# Patient Record
Sex: MALE | Race: BLACK OR AFRICAN AMERICAN | Employment: FULL TIME | URBAN - METROPOLITAN AREA
[De-identification: names, ages, dates, MRNs, and addresses within clinical notes are randomized per-mention and may not be internally consistent; named-entity substitution may affect disease eponyms.]

---

## 2017-10-27 ENCOUNTER — OFFICE VISIT (OUTPATIENT)
Dept: INTERNAL MEDICINE CLINIC | Age: 42
End: 2017-10-27

## 2017-10-27 VITALS
TEMPERATURE: 99.9 F | OXYGEN SATURATION: 99 % | HEART RATE: 97 BPM | WEIGHT: 215.25 LBS | DIASTOLIC BLOOD PRESSURE: 105 MMHG | RESPIRATION RATE: 18 BRPM | SYSTOLIC BLOOD PRESSURE: 170 MMHG | HEIGHT: 69 IN | BODY MASS INDEX: 31.88 KG/M2

## 2017-10-27 DIAGNOSIS — L40.9 PSORIASIS: Chronic | ICD-10-CM

## 2017-10-27 DIAGNOSIS — I10 ACCELERATED HYPERTENSION: Primary | ICD-10-CM

## 2017-10-27 DIAGNOSIS — K42.9 UMBILICAL HERNIA WITHOUT OBSTRUCTION AND WITHOUT GANGRENE: ICD-10-CM

## 2017-10-27 DIAGNOSIS — E11.9 TYPE 2 DIABETES MELLITUS WITH HEMOGLOBIN A1C GOAL OF LESS THAN 7.0% (HCC): ICD-10-CM

## 2017-10-27 LAB — HBA1C MFR BLD HPLC: 6.2 %

## 2017-10-27 RX ORDER — AMLODIPINE BESYLATE 5 MG/1
5 TABLET ORAL DAILY
Qty: 30 TAB | Refills: 0 | Status: SHIPPED | OUTPATIENT
Start: 2017-10-27 | End: 2017-11-10 | Stop reason: SDUPTHER

## 2017-10-27 RX ORDER — FLUOCINONIDE 0.5 MG/G
OINTMENT TOPICAL
Refills: 2 | COMMUNITY
Start: 2017-08-07 | End: 2017-11-10

## 2017-10-27 RX ORDER — ASPIRIN 81 MG/1
81 TABLET ORAL DAILY
COMMUNITY
End: 2018-06-07

## 2017-10-27 RX ORDER — LISINOPRIL 2.5 MG/1
TABLET ORAL DAILY
COMMUNITY
End: 2017-10-27 | Stop reason: ALTCHOICE

## 2017-10-27 NOTE — MR AVS SNAPSHOT
Visit Information Date & Time Provider Department Dept. Phone Encounter #  
 10/27/2017  2:15 PM Nicko Goncalves MD [facility] [address] [facility] [id number] Follow-up Instructions Return in about 2 weeks (around 11/10/2017). Your Appointments 11/14/2017  1:30 PM  
New Patient with Nicko Goncalves MD  
[facility] Appt Note: new pt wants to become established [address] [facility] [address]  
[phone]  
  
   
 [address] [facility] [address] Upcoming Health Maintenance Date Due DTaP/Tdap/Td series (1 - Tdap) 5/3/1998 INFLUENZA AGE 9 TO ADULT 8/1/2017 Allergies as of 10/27/2017  Review Complete On: 10/27/2017 By: Nicko Goncalves MD  
 No Known Allergies Current Immunizations  Reviewed on 10/27/2017 Name Date  
 TB Skin Test (PPD) 4/1/2017 Reviewed by Nicko Goncalves MD on 10/27/2017 at  2:37 PM  
You Were Diagnosed With   
  
 Codes Comments Accelerated hypertension    -  Primary ICD-10-CM: I10 
ICD-9-CM: 401.0 Psoriasis     ICD-10-CM: L40.9 ICD-9-CM: 696.1 Type 2 diabetes mellitus with hemoglobin A1c goal of less than 7.0% (HCC)     ICD-10-CM: E11.9 ICD-9-CM: 250.00 Umbilical hernia without obstruction and without gangrene     ICD-10-CM: K42.9 ICD-9-CM: 553.1 Vitals BP Pulse Temp Resp Height(growth percentile) Weight(growth percentile) (!) 170/105 (BP 1 Location: Left arm, BP Patient Position: Sitting) 97 99.9 °F (37.7 °C) (Oral) 18 5' 9\" (1.753 m) 215 lb 4 oz (97.6 kg) SpO2 BMI Smoking Status 99% 31.79 kg/m2 Light Tobacco Smoker Vitals History BMI and BSA Data Body Mass Index Body Surface Area 31.79 kg/m 2 2.18 m 2 Preferred Pharmacy Pharmacy Name Phone [id number] Stafford, [address] [facility] [facility] [phone] Your Updated Medication List  
 This list is accurate as of: 10/27/17  3:08 PM.  Always use your most recent med list. amLODIPine 5 mg tablet Commonly known as:  Cristo Eagles Take 1 Tab by mouth daily. aspirin delayed-release 81 mg tablet Take 1 Tab by mouth daily. fluocinoNIDE 0.05 % ointment Commonly known as:  LIDEX APPLY TO AFFECTED AREA 2 TIMES DAILY TO PALMS & SOLES Prescriptions Sent to Pharmacy Refills  
 amLODIPine (NORVASC) 5 mg tablet 0 Sig: Take 1 Tab by mouth daily. Class: Normal  
 Pharm[id number] [address] [facility] Ph #: [phone] Route: Oral  
  
We Performed the Following AMB POC HEMOGLOBIN A1C [94655 CPT(R)] METABOLIC PANEL, BASIC [31844 CPT(R)] MICROALBUMIN, UR, RAND W/ MICROALBUMIN/CREA RATIO [id number] CPT(R)] REFERRAL TO SURGERY [NTS074 Custom] Follow-up Instructions Return in about 2 weeks (around 11/10/2017). Referral Information Referral ID Referred By Referred To  
  
 [id number] David Warren MD   
   [address] [place] [address] Phone: [phone] Fax: [phone] Visits Status Start Date End Date 1 New Request 10/27/17 10/27/18 If your referral has a status of pending review or denied, additional information will be sent to support the outcome of this decision. Introducing [facility] & HEALTH SERVICES! Remi Lieberman introduces [employer] patient portal. Now you can access parts of your medical record, email your doctor's office, and request medication refills online. 1. In your internet browser, go to [email or web address] 2. Click on the First Time User? Click Here link in the Sign In box. You will see the New Member Sign Up page. 3. Enter your [employer] Access Code exactly as it appears below. You will not need to use this code after youve completed the sign-up process.  If you do not sign up before the expiration date, you must request a new code. · Toolmeet Access Code: [email or web address] Expires: 1/25/2018  1:32 PM 
 
4. Enter the last four digits of your Social Security Number (xxxx) and Date of Birth (mm/dd/yyyy) as indicated and click Submit. You will be taken to the next sign-up page. 5. Create a Toolmeet ID. This will be your Toolmeet login ID and cannot be changed, so think of one that is secure and easy to remember. 6. Create a Toolmeet password. You can change your password at any time. 7. Enter your Password Reset Question and Answer. This can be used at a later time if you forget your password. 8. Enter your e-mail address. You will receive e-mail notification when new information is available in [address] 9. Click Sign Up. You can now view and download portions of your medical record. 10. Click the Download Summary menu link to download a portable copy of your medical information. If you have questions, please visit the Frequently Asked Questions section of the Toolmeet website. Remember, Toolmeet is NOT to be used for urgent needs. For medical emergencies, dial 911. Now available from your iPhone and Android! Please provide this summary of care documentation to your next provider. Your primary care clinician is listed as Pastor Muller. If you have any questions after today's visit, please call [phone].

## 2017-10-27 NOTE — PROGRESS NOTES
HISTORY OF PRESENT ILLNESS  Jayshree Jimenez is a 38 y.o. male. HPI  new to my practice  Transferring care from Dr. Gain Skinner in [address] evaluated there 2  months ago. Previous medical records are not available for my review at this visit. Hypertension:  Jayshree Jimenez is a 38 y.o. male with hypertension. without Chronic kidney disease stage    Medication change since last visit: No  The patient reports:  taking medications as instructed, no medication side effects noted, home BP monitoring in range of 245-384'M systolic over 64'H diastolic, no chest pain on exertion, no dyspnea on exertion, no swelling of ankles, no orthostatic dizziness or lightheadedness, no palpitations. Lifestyle modification/social history: generally follows a low fat low cholesterol diet, generally follows a low sodium diet, follows a diabetic diet regularly, smoker 1 cigar weekly    No results found for: NA, K, CL, CO2, AGAP, GLU, BUN, CREA, BUCR, GFRAA, GFRNA, CA, GFRAA    Diabetes:  He is here for follow up of diabetes. Proteinuria: no  Neuropathy: yes  Medication change since last visit:  Yes - stopped [place] R[place]u and Keiser 2 months ago   Diabetic Review of Systems - medication compliance: noncompliant much of the time, diabetic diet compliance: compliant most of the time, home glucose monitoring: is performed regularly, nonfasting values range 150-200. Hypoglycemic symptoms: no  Dilated eye exam in the last year: yes      No results found for: HBA1C, HGBE8, ETS9FDUE, RMF9GYAN  No results found for: MCACR, MCA1, MCA2, MCA3, MCAU, MCAU2, MCALPOCT      Last Point of Care HGB A1C  No results found for: UJN1CKEO     Hgb A1C done today is 6.2%    Patient Active Problem List   Diagnosis Code    Psoriasis L40.9     History reviewed. No pertinent surgical history.   Social History     Social History    Marital status: SINGLE     Spouse name: N/A    Number of children: N/A    Years of education: N/A     Occupational History    Not on file. Social History Main Topics    Smoking status: Light Tobacco Smoker     Types: Cigars    Smokeless tobacco: Never Used      Comment: x1 week    Alcohol use 1.8 oz/week     3 Cans of beer per week    Drug use: No    Sexual activity: Not Currently     Partners: Female     Other Topics Concern    Not on file     Social History Narrative    No narrative on file     Family History   Problem Relation Age of Onset    Cancer Mother      Stomach    Diabetes Father     Diabetes Brother     Hypertension Neg Hx      Current Outpatient Prescriptions   Medication Sig    fluocinoNIDE (LIDEX) 0.05 % ointment APPLY TO AFFECTED AREA 2 TIMES DAILY TO PALMS & SOLES    aspirin delayed-release 81 mg tablet Take 1 Tab by mouth daily.  amLODIPine (NORVASC) 5 mg tablet Take 1 Tab by mouth daily. No current facility-administered medications for this visit. No Known Allergies  Immunization History   Administered Date(s) Administered    TB Skin Test (PPD) 04/01/2017             Review of Systems   Constitutional: Negative for malaise/fatigue and weight loss. Eyes: Negative for blurred vision and pain. Respiratory: Negative for cough, shortness of breath and wheezing. Cardiovascular: Negative for chest pain, palpitations and leg swelling. Gastrointestinal: Negative for blood in stool, constipation, diarrhea, heartburn, nausea and vomiting. Genitourinary: Negative. Musculoskeletal: Negative for back pain, joint pain and myalgias. Skin: Negative for rash. Neurological: Negative for dizziness and headaches. Endo/Heme/Allergies: Negative for environmental allergies. Does not bruise/bleed easily. Psychiatric/Behavioral: Negative for depression. The patient is not nervous/anxious and does not have insomnia. Physical Exam   Constitutional: He appears well-developed and well-nourished. No distress.    BP (!) 170/105 (BP 1 Location: Left arm, BP Patient Position: Sitting)  Pulse 97 Temp 99.9 °F (37.7 °C) (Oral)   Resp 18  Ht 5' 9\" (1.753 m)  Wt 215 lb 4 oz (97.6 kg)  SpO2 99%  BMI 31.79 kg/m2Body mass index is 31.79 kg/(m^2). HENT:   Mouth/Throat: Oropharynx is clear and moist.   Neck: No JVD present. Carotid bruit is not present. Cardiovascular: Normal rate, regular rhythm, normal heart sounds and intact distal pulses. Pulmonary/Chest: Effort normal and breath sounds normal.   Abdominal: Soft. Normal appearance and bowel sounds are normal. He exhibits no distension and no mass. There is no hepatosplenomegaly. There is no tenderness. There is no rigidity, no guarding, no CVA tenderness, no tenderness at McBurney's point and negative Singh's sign. A hernia is present. Easily reducible umbilical hernia, nontender. Musculoskeletal: He exhibits no edema. Neurological: He is alert. Skin: Skin is warm and dry. He is not diaphoretic. Mild dry scaly rash where depicted   Nursing note and vitals reviewed. ASSESSMENT and PLAN  Diagnoses and all orders for this visit:    1. Accelerated hypertension - off medication. Resume amlodipine at 5mg/ day. Log blood pressures at home while sitting, relaxed 3-5 times weekly and bring to next visit. Pt educated on goal BP of 130/80 on average or lower. Call office as soon as possible if BP's over 140/90 or below 110/50 on multiple occasions and/or with symptoms of dizziness, chest pain, shortness of breath, headache or ankle swelling. Recheck log and bp here in 2 week(s). -     METABOLIC PANEL, BASIC  -     amLODIPine (NORVASC) 5 mg tablet; Take 1 Tab by mouth daily. 2. Psoriasis -follow up with derm. Fair control with lidex now    3. Type 2 diabetes mellitus with hemoglobin A1c goal of less than 7.0% ([facility]) - off medication.    -     AMB POC HEMOGLOBIN A1C  -     MICROALBUMIN, UR, RAND W/ MICROALBUMIN/CREA RATIO    4.  Umbilical hernia without obstruction and without gangrene - he is having some intermittant pain without incarceration reported. He would like to check options on repair.  -     REFERRAL TO SURGERY      Follow-up Disposition:  Return in about 2 weeks (around 11/10/2017).

## 2017-10-28 LAB
ALBUMIN/CREAT UR: 25.3 MG/G CREAT (ref 0–30)
BUN SERPL-MCNC: 8 MG/DL (ref 6–24)
BUN/CREAT SERPL: 11 (ref 9–20)
CALCIUM SERPL-MCNC: 9.8 MG/DL (ref 8.7–10.2)
CHLORIDE SERPL-SCNC: 98 MMOL/L (ref 96–106)
CO2 SERPL-SCNC: 27 MMOL/L (ref 18–29)
CREAT SERPL-MCNC: 0.75 MG/DL (ref 0.76–1.27)
CREAT UR-MCNC: 366.5 MG/DL
GFR SERPLBLD CREATININE-BSD FMLA CKD-EPI: 115 ML/MIN/1.73
GFR SERPLBLD CREATININE-BSD FMLA CKD-EPI: 133 ML/MIN/1.73
GLUCOSE SERPL-MCNC: 121 MG/DL (ref 65–99)
MICROALBUMIN UR-MCNC: 92.9 UG/ML
POTASSIUM SERPL-SCNC: 4 MMOL/L (ref 3.5–5.2)
SODIUM SERPL-SCNC: 142 MMOL/L (ref 134–144)

## 2017-11-06 ENCOUNTER — OFFICE VISIT (OUTPATIENT)
Dept: SURGERY | Age: 42
End: 2017-11-06

## 2017-11-06 VITALS
SYSTOLIC BLOOD PRESSURE: 157 MMHG | HEART RATE: 117 BPM | HEIGHT: 69 IN | DIASTOLIC BLOOD PRESSURE: 102 MMHG | TEMPERATURE: 98.3 F | RESPIRATION RATE: 20 BRPM | OXYGEN SATURATION: 99 % | BODY MASS INDEX: 32.14 KG/M2 | WEIGHT: 217 LBS

## 2017-11-06 DIAGNOSIS — K42.0 INCARCERATED UMBILICAL HERNIA: ICD-10-CM

## 2017-11-06 PROBLEM — E11.9 TYPE II DIABETES MELLITUS (HCC): Status: ACTIVE | Noted: 2017-11-06

## 2017-11-06 PROBLEM — I10 HTN (HYPERTENSION): Status: ACTIVE | Noted: 2017-11-06

## 2017-11-06 PROBLEM — E66.9 OBESITY (BMI 30.0-34.9): Status: ACTIVE | Noted: 2017-11-06

## 2017-11-06 NOTE — PROGRESS NOTES
Surgery History and Physical    Subjective:      Camilla Jones is a 38 y.o. black male who presents for evaluation of an umbilical hernia. Mr. Derek Akers has recently lost a lot of weight. He has now noticed a bulge at his bellybutton which is getting bigger. 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 The bulge fluctuates in size, but never completely goes away completely. He is eating fine and moving his bowels normally. He denies any previous hernia repairs or abdominal surgery. He does smoke a cigar on occasion. Past Medical History:   Diagnosis Date    Diabetes ([address]     Hypertension     Obesity (BMI 30.0-34. 9)     Psoriasis      No past surgical history on file. Family History   Problem Relation Age of Onset    Cancer Mother      Stomach    Diabetes Father     Diabetes Brother     Hypertension Neg Hx      Social History   Substance Use Topics    Smoking status: Light Tobacco Smoker     Types: Cigars    Smokeless tobacco: Never Used      Comment: x1 week    Alcohol use 1.8 oz/week     3 Cans of beer per week      Prior to Admission medications    Medication Sig Start Date End Date Taking? Authorizing Provider   aspirin delayed-release 81 mg tablet Take 1 Tab by mouth daily. Yes Avelina Crump MD   amLODIPine (NORVASC) 5 mg tablet Take 1 Tab by mouth daily. 10/27/17  Yes Avelina Crump MD   fluocinoNIDE (LIDEX) 0.05 % ointment APPLY TO AFFECTED AREA 2 TIMES DAILY TO PALMS & SOLES 8/7/17   Historical Provider      Allergies   Allergen Reactions    Metformin Other (comments)     Cannot tolerate pill size       Review of Systems:  A comprehensive review of systems was negative except for that written in the History of Present Illness.     Objective:      Physical Exam:  GENERAL: alert, cooperative, no distress, appears stated age, EYE: negative findings: anicteric sclera, LYMPHATIC: Cervical, supraclavicular nodes normal. , THROAT & NECK: neck supple and symmetrical.  The thyroid is grossly normal., LUNG: clear to auscultation bilaterally, HEART: regular rate and rhythm, ABDOMEN: Soft, obese, ND. There is a tender, incarcerated umbilical hernia., EXTREMITIES:  no edema, SKIN: Normal., NEUROLOGIC: negative, PSYCHIATRIC: non focal    Assessment:     Incarcerated umbilical hernia without gangrene or obstruction. Plan:     Mr. Eusebia Shen is eager to have his hernia repaired. I discussed the risks of the procedure including bleeding, infection, wound healing problems, recurrence of the hernia, seroma, injury to the bowel, blood clots, and reaction to the prep or local and general anesthetic. He understands the risks; any and all questions were answered to his satisfaction. Mr. Eusebia Shen will be scheduled for an elective outpatient robotic-assisted laparoscopic umbilical herniorrhaphy with mesh, possible open under general anesthesia.     Signed By: Kelsey Armstrong MD     November 6, 2017

## 2017-11-10 ENCOUNTER — ANESTHESIA EVENT (OUTPATIENT)
Dept: SURGERY | Age: 42
End: 2017-11-10
Payer: COMMERCIAL

## 2017-11-10 ENCOUNTER — OFFICE VISIT (OUTPATIENT)
Dept: INTERNAL MEDICINE CLINIC | Age: 42
End: 2017-11-10

## 2017-11-10 VITALS
TEMPERATURE: 98.5 F | RESPIRATION RATE: 18 BRPM | HEART RATE: 107 BPM | SYSTOLIC BLOOD PRESSURE: 139 MMHG | BODY MASS INDEX: 31.92 KG/M2 | WEIGHT: 215.5 LBS | HEIGHT: 69 IN | DIASTOLIC BLOOD PRESSURE: 89 MMHG | OXYGEN SATURATION: 98 %

## 2017-11-10 DIAGNOSIS — E11.9 TYPE 2 DIABETES MELLITUS WITH HEMOGLOBIN A1C GOAL OF LESS THAN 7.0% (HCC): ICD-10-CM

## 2017-11-10 DIAGNOSIS — I10 ESSENTIAL HYPERTENSION: Primary | ICD-10-CM

## 2017-11-10 RX ORDER — AMLODIPINE BESYLATE 5 MG/1
5 TABLET ORAL DAILY
Qty: 30 TAB | Refills: 3 | Status: SHIPPED | OUTPATIENT
Start: 2017-11-10 | End: 2017-12-04 | Stop reason: SDUPTHER

## 2017-11-10 NOTE — MR AVS SNAPSHOT
Visit Information Date & Time Provider Department Dept. Phone Encounter #  
 11/10/2017  9:45 AM Kayden Cohen MD [facility] [address] [id number] Follow-up Instructions Return in about 3 months (around 2/10/2018). Your Appointments 11/29/2017  9:50 AM  
[place] [id number] [facility] ([facility]) Appt Note: 11/13/17: FLORECITA: Jose Antonio Cadena: Michael-asstkat lap umb hernia repair with mesh, poss open, PO  
 [address] [facility]  
[phone]  
  
   
 [address] [facility] [address] [facility] Upcoming Health Maintenance Date Due  
 LIPID PANEL Q1 1977 FOOT EXAM Q1 5/3/1987 EYE EXAM RETINAL OR DILATED Q1 5/3/1987 Pneumococcal 19-64 Medium Risk (1 of 1 - PPSV23) 5/3/1996 DTaP/Tdap/Td series (1 - Tdap) 5/3/1998 Influenza Age 5 to Adult 8/1/2017 HEMOGLOBIN A1C Q6M 4/27/2018 MICROALBUMIN Q1 10/27/2018 Allergies as of 11/10/2017  Review Complete On: 11/10/2017 By: Yoni Branham LPN Severity Noted Reaction Type Reactions Metformin  10/27/2017    Other (comments) Cannot tolerate pill size Current Immunizations  Reviewed on 10/27/2017 Name Date  
 TB Skin Test (PPD) 4/1/2017 Not reviewed this visit You Were Diagnosed With   
  
 Codes Comments Type 2 diabetes mellitus with hemoglobin A1c goal of less than 7.0% ([facility])    -  Primary ICD-10-CM: E11.9 ICD-9-CM: 250.00 Essential hypertension     ICD-10-CM: I10 
ICD-9-CM: 401.9 Vitals BP Pulse Temp Resp Height(growth percentile) Weight(growth percentile) 139/89 (BP 1 Location: Left arm, BP Patient Position: Sitting) (!) 107 98.5 °F (36.9 °C) (Oral) 18 5' 9\" (1.753 m) 215 lb 8 oz (97.8 kg) SpO2 BMI Smoking Status 98% 31.82 kg/m2 Light Tobacco Smoker Vitals History BMI and BSA Data Body Mass Index Body Surface Area  
 31.82 kg/m 2 2.18 m 2 Preferred Pharmacy Pharmacy Name Phone [id number] Fernando, [address] [facility] [phone] Your Updated Medication List  
  
   
This list is accurate as of: 11/10/17 10:02 AM.  Always use your most recent med list. amLODIPine 5 mg tablet Commonly known as:  Elspeth Bakes Take 1 Tab by mouth daily. aspirin delayed-release 81 mg tablet Take 1 Tab by mouth daily. Prescriptions Sent to Pharmacy Refills  
 amLODIPine (NORVASC) 5 mg tablet 3 Sig: Take 1 Tab by mouth daily. Class: Normal  
 Pharmacy: [facility]/pharm[facility] #[id number] - [place], [address] [facility] Ph #: [phone] Route: Oral  
  
We Performed the Following LIPID PANEL [10935 CPT(R)] Follow-up Instructions Return in about 3 months (around 2/10/2018). Introducing [facility] & HEALTH SERVICES! Clarissa Napoles introduces [employer] patient portal. Now you can access parts of your medical record, email your doctor's office, and request medication refills online. 1. In your internet browser, go to [email or web address] 2. Click on the First Time User? Click Here link in the Sign In box. You will see the New Member Sign Up page. 3. Enter your [employer] Access Code exactly as it appears below. You will not need to use this code after youve completed the sign-up process. If you do not sign up before the expiration date, you must request a new code. · [employer] Access Code: [email or web address] Expires: 1/25/2018 12:32 PM 
 
4. Enter the last four digits of your Social Security Number (xxxx) and Date of Birth (mm/dd/yyyy) as indicated and click Submit. You will be taken to the next sign-up page. 5. Create a TrustedPlacest ID. This will be your [employer] login ID and cannot be changed, so think of one that is secure and easy to remember. 6. Create a Ang[employer] password. You can change your password at any time. 7. Enter your Password Reset Question and Answer. This can be used at a later time if you forget your password. 8. Enter your e-mail address. You will receive e-mail notification when new information is available in [address] 9. Click Sign Up. You can now view and download portions of your medical record. 10. Click the Download Summary menu link to download a portable copy of your medical information. If you have questions, please visit the Frequently Asked Questions section of the [employer] website. Remember, [employer] is NOT to be used for urgent needs. For medical emergencies, dial 911. Now available from your iPhone and Android! Please provide this summary of care documentation to your next provider. Your primary care clinician is listed as Evelina Hugo. If you have any questions after today's visit, please call [phone].

## 2017-11-10 NOTE — PROGRESS NOTES
HISTORY OF PRESENT ILLNESS  Volodymyr Templeton is a 38 y.o. male. HPI  Hypertension:  Volodymyr Templeton is a 38 y.o. male with hypertension. without Chronic kidney disease stage    Medication change since last visit: Yes: Comment: resumed amlodipine  The patient reports:  taking medications as instructed, no medication side effects noted, home BP monitoring in range of 007'F systolic over 93'X diastolic, no chest pain on exertion, no dyspnea on exertion, no swelling of ankles, no orthostatic dizziness or lightheadedness, no palpitations. Lifestyle modification/social history: generally follows a low fat low cholesterol diet, generally follows a low sodium diet, follows a diabetic diet regularly, exercises sporadically, nonsmoker    Lab Results   Component Value Date/Time    Sodium 142 10/27/2017 03:32 PM    Potassium 4.0 10/27/2017 03:32 PM    Chloride 98 10/27/2017 03:32 PM    CO2 27 10/27/2017 03:32 PM    Glucose 121 10/27/2017 03:32 PM    BUN 8 10/27/2017 03:32 PM    Creatinine 0.75 10/27/2017 03:32 PM    BUN/Creatinine ratio 11 10/27/2017 03:32 PM    GFR est  10/27/2017 03:32 PM    GFR est non- 10/27/2017 03:32 PM    Calcium 9.8 10/27/2017 03:32 PM     Diabetes:  He is here for follow up of diabetes. Proteinuria: no  Neuropathy: no  Medication change since last visit:  Not applicable        Lab Results   Component Value Date/Time    Hemoglobin A1c (POC) 6.2 10/27/2017 02:50 PM     Lab Results   Component Value Date/Time    Microalb/Creat ratio (ug/mg creat.) 25.3 10/27/2017 03:32 PM         Last Point of Care HGB A1C  Hemoglobin A1c (POC)   Date Value Ref Range Status   10/27/2017 6.2 % Final              ROS    Physical Exam   Constitutional: He appears well-developed and well-nourished. No distress.    /89 (BP 1 Location: Left arm, BP Patient Position: Sitting)  Pulse (!) 107  Temp 98.5 °F (36.9 °C) (Oral)   Resp 18  Ht 5' 9\" (1.753 m)  Wt 215 lb 8 oz (97.8 kg)  SpO2 98%  BMI 31.82 kg/m2Body mass index is 31.82 kg/(m^2). HENT:   Mouth/Throat: Oropharynx is clear and moist.   Neck: No JVD present. Carotid bruit is not present. Cardiovascular: Normal rate, regular rhythm, normal heart sounds and intact distal pulses. Pulmonary/Chest: Effort normal and breath sounds normal.   Musculoskeletal: He exhibits no edema. Neurological: He is alert. Skin: Skin is warm and dry. He is not diaphoretic. Nursing note and vitals reviewed. ASSESSMENT and PLAN  Diagnoses and all orders for this visit:    1. Essential hypertension - much better control. Log blood pressures at home while sitting, relaxed 3-5 times weekly and bring to next visit. Pt educated on goal BP of 130/80 on average or lower. Call office as soon as possible if BP's over 140/90 or below 110/50 on multiple occasions and/or with symptoms of dizziness, chest pain, shortness of breath, headache or ankle swelling. Recheck log and bp here in 3 month(s). -     LIPID PANEL  -     amLODIPine (NORVASC) 5 mg tablet; Take 1 Tab by mouth daily. 2. Type 2 diabetes mellitus with hemoglobin A1c goal of less than 7.0% ([facility]) - We discussed diet management of his prediabetes or diabetes. he is advised to reduce complex carbs/ starches, avoid breads if possible and avoid concentrated sweets and drinks. Recheck A1c in 3 months. -     LIPID PANEL      Follow-up Disposition:  Return in about 3 months (around 2/10/2018).

## 2017-11-10 NOTE — PROGRESS NOTES
[facility]  PREOPERATIVE INSTRUCTIONS    Surgery Date:   11/13/2017      Surgery arrival time given by surgeon: NO  (If [facility] staff will call you between 3pm - 7pm the day before surgery with your arrival time. If your surgery is on a Monday, we will call you the preceding Friday. Please call [phone] after 7pm if you did not receive your arrival time.)  1. Report  to the 2nd Floor Admitting Desk on the day of your surgery. Bring your insurance card, photo identification, and any copayment (if applicable). 2. You must have a responsible adult to drive you home and stay with you the first 24 hours after surgery if you are going home the same day of your surgery. 3. Nothing to eat or drink after midnight the night before surgery. This means NO water, gum, mints, coffee, juice, etc.    4. MEDICATIONS TO TAKE THE MORNING OF SURGERY WITH A SIP OF WATER:amlodipine  5. No alcoholic beverages 24 hours before and after your surgery. 6. If you are being admitted to the hospital,please leave personal belongings/luggage in your car until you have an assigned hospital room number. ( The hospital discharge time is 12 PM NOON. Your adult  should be at the hospital prior to the noon discharge time unless otherwise instructed.)   7. STOP Aspirin and/or any non-steroidal anti-inflammatory drugs (i.e. Ibuprofen, Naproxen, Advil, Aleve) as directed by your surgeon. You may take Tylenol. Stop herbal supplements 1 week prior to  surgery. 8. If you are currently taking Plavix, Coumadin,or any other blood-thinning/ anticoagulant medication contact your surgeon for instructions. 9. Wear comfortable clothes. Wear your glasses instead of contacts. Please leave all money, jewelry and valuables at home. No make up, particularly mascara, the day of surgery. 10.  REMOVE ALL body piercings, rings,and jewelry and leave at home. Wear your hair loose or down, no pony-tails, buns, or any metal hair clips.    11. If you shower the morning of surgery, please do not apply any lotions, powders, or deodorants afterwards. Do not shave any body area within 24 hours of your surgery. 12. Please follow all instructions to avoid any potential surgical cancellation. 13. Should your physical condition change, (i.e. fever, cold, flu, etc.) please notify your surgeon as soon as possible. 14. It is important to be on time. If a situation occurs where you may be delayed, please call:  [phone] / [phone] on the day of surgery. 15. The Preadmission [employer] staff can be reached at 21 [phone]. 16. Special instructions: Free  Parking  17. The patient was contacted  via phone. He  verbalize  understanding of all instructions does not  need reinforcement.

## 2017-11-13 ENCOUNTER — ANESTHESIA (OUTPATIENT)
Dept: SURGERY | Age: 42
End: 2017-11-13
Payer: COMMERCIAL

## 2017-11-13 ENCOUNTER — HOSPITAL ENCOUNTER (OUTPATIENT)
Age: 42
Setting detail: OUTPATIENT SURGERY
Discharge: HOME | End: 2017-11-13
Attending: SURGERY | Admitting: SURGERY
Payer: COMMERCIAL

## 2017-11-13 VITALS
RESPIRATION RATE: 24 BRPM | HEART RATE: 91 BPM | DIASTOLIC BLOOD PRESSURE: 65 MMHG | OXYGEN SATURATION: 95 % | SYSTOLIC BLOOD PRESSURE: 109 MMHG | BODY MASS INDEX: 30.68 KG/M2 | HEIGHT: 70 IN | WEIGHT: 214.29 LBS | TEMPERATURE: 98 F

## 2017-11-13 DIAGNOSIS — K70.31 ALCOHOLIC CIRRHOSIS OF LIVER WITH ASCITES (HCC): ICD-10-CM

## 2017-11-13 DIAGNOSIS — K70.31 ASCITES DUE TO ALCOHOLIC CIRRHOSIS (HCC): ICD-10-CM

## 2017-11-13 LAB
BASOPHILS # BLD: 0.1 K/UL (ref 0–0.1)
BASOPHILS NFR BLD: 1 % (ref 0–1)
EOSINOPHIL # BLD: 0.2 K/UL (ref 0–0.4)
EOSINOPHIL NFR BLD: 1 % (ref 0–7)
ERYTHROCYTE [DISTWIDTH] IN BLOOD BY AUTOMATED COUNT: 12.3 % (ref 11.5–14.5)
GLUCOSE BLD STRIP.AUTO-MCNC: 163 MG/DL (ref 65–100)
HCT VFR BLD AUTO: 46 % (ref 36.6–50.3)
HGB BLD-MCNC: 16.7 G/DL (ref 12.1–17)
LYMPHOCYTES # BLD: 3.3 K/UL (ref 0.8–3.5)
LYMPHOCYTES NFR BLD: 25 % (ref 12–49)
MCH RBC QN AUTO: 33.8 PG (ref 26–34)
MCHC RBC AUTO-ENTMCNC: 36.3 G/DL (ref 30–36.5)
MCV RBC AUTO: 93.1 FL (ref 80–99)
MONOCYTES # BLD: 1.2 K/UL (ref 0–1)
MONOCYTES NFR BLD: 9 % (ref 5–13)
NEUTS SEG # BLD: 8.8 K/UL (ref 1.8–8)
NEUTS SEG NFR BLD: 64 % (ref 32–75)
PLATELET # BLD AUTO: 225 K/UL (ref 150–400)
RBC # BLD AUTO: 4.94 M/UL (ref 4.1–5.7)
SERVICE CMNT-IMP: ABNORMAL
WBC # BLD AUTO: 13.5 K/UL (ref 4.1–11.1)

## 2017-11-13 PROCEDURE — 77030010507 HC ADH SKN DERMBND J&J -B: Performed by: SURGERY

## 2017-11-13 PROCEDURE — 82962 GLUCOSE BLOOD TEST: CPT

## 2017-11-13 PROCEDURE — 77030002895 HC DEV VASC CLOSR COVD -B: Performed by: SURGERY

## 2017-11-13 PROCEDURE — 77030016151 HC PROTCTR LNS DFOG COVD -B: Performed by: SURGERY

## 2017-11-13 PROCEDURE — 88342 IMHCHEM/IMCYTCHM 1ST ANTB: CPT | Performed by: SURGERY

## 2017-11-13 PROCEDURE — 77030037032 HC INSRT SCIS CLICKLLINE DISP STOR -B: Performed by: SURGERY

## 2017-11-13 PROCEDURE — 85025 COMPLETE CBC W/AUTO DIFF WBC: CPT | Performed by: SURGERY

## 2017-11-13 PROCEDURE — 36415 COLL VENOUS BLD VENIPUNCTURE: CPT | Performed by: SURGERY

## 2017-11-13 PROCEDURE — 77030013079 HC BLNKT BAIR HGGR 3M -A: Performed by: NURSE ANESTHETIST, CERTIFIED REGISTERED

## 2017-11-13 PROCEDURE — 76060000033 HC ANESTHESIA 1 TO 1.5 HR: Performed by: SURGERY

## 2017-11-13 PROCEDURE — 74011250636 HC RX REV CODE- 250/636

## 2017-11-13 PROCEDURE — 77030011640 HC PAD GRND REM COVD -A: Performed by: SURGERY

## 2017-11-13 PROCEDURE — 77030035045 HC TRCR ENDOSC VRSPRT BLDLSS COVD -B: Performed by: SURGERY

## 2017-11-13 PROCEDURE — 74011000250 HC RX REV CODE- 250

## 2017-11-13 PROCEDURE — 77030035277 HC OBTRTR BLDELSS DISP INTU -B: Performed by: SURGERY

## 2017-11-13 PROCEDURE — 77030008684 HC TU ET CUF COVD -B: Performed by: NURSE ANESTHETIST, CERTIFIED REGISTERED

## 2017-11-13 PROCEDURE — 77030002933 HC SUT MCRYL J&J -A: Performed by: SURGERY

## 2017-11-13 PROCEDURE — 77030018673: Performed by: SURGERY

## 2017-11-13 PROCEDURE — 77030013474 HC CRD BPLR DISP ADLR -A: Performed by: SURGERY

## 2017-11-13 PROCEDURE — 77030020703 HC SEAL CANN DISP INTU -B: Performed by: SURGERY

## 2017-11-13 PROCEDURE — 74011250636 HC RX REV CODE- 250/636: Performed by: ANESTHESIOLOGY

## 2017-11-13 PROCEDURE — 88305 TISSUE EXAM BY PATHOLOGIST: CPT | Performed by: SURGERY

## 2017-11-13 PROCEDURE — 87205 SMEAR GRAM STAIN: CPT | Performed by: SURGERY

## 2017-11-13 PROCEDURE — 74011000250 HC RX REV CODE- 250: Performed by: SURGERY

## 2017-11-13 PROCEDURE — 77030019908 HC STETH ESOPH SIMS -A: Performed by: NURSE ANESTHETIST, CERTIFIED REGISTERED

## 2017-11-13 PROCEDURE — 77030032490 HC SLV COMPR SCD KNE COVD -B: Performed by: SURGERY

## 2017-11-13 PROCEDURE — 77030018836 HC SOL IRR NACL ICUM -A: Performed by: SURGERY

## 2017-11-13 PROCEDURE — 77030026438 HC STYL ET INTUB CARD -A: Performed by: NURSE ANESTHETIST, CERTIFIED REGISTERED

## 2017-11-13 PROCEDURE — 77030031139 HC SUT VCRL2 J&J -A: Performed by: SURGERY

## 2017-11-13 PROCEDURE — 77030009848 HC PASSR SUT SET COOP -C: Performed by: SURGERY

## 2017-11-13 PROCEDURE — 77030033188 HC TBNG FLTRD BIIFUR DISP CNMD -C: Performed by: SURGERY

## 2017-11-13 PROCEDURE — 77030020782 HC GWN BAIR PAWS FLX 3M -B

## 2017-11-13 PROCEDURE — 87186 SC STD MICRODIL/AGAR DIL: CPT | Performed by: SURGERY

## 2017-11-13 PROCEDURE — 76210000020 HC REC RM PH II FIRST 0.5 HR: Performed by: SURGERY

## 2017-11-13 PROCEDURE — 87077 CULTURE AEROBIC IDENTIFY: CPT | Performed by: SURGERY

## 2017-11-13 PROCEDURE — 88307 TISSUE EXAM BY PATHOLOGIST: CPT | Performed by: SURGERY

## 2017-11-13 PROCEDURE — 76010000149 HC OR TIME 1 TO 1.5 HR: Performed by: SURGERY

## 2017-11-13 PROCEDURE — 76210000006 HC OR PH I REC 0.5 TO 1 HR: Performed by: SURGERY

## 2017-11-13 PROCEDURE — 88112 CYTOPATH CELL ENHANCE TECH: CPT | Performed by: SURGERY

## 2017-11-13 PROCEDURE — 88313 SPECIAL STAINS GROUP 2: CPT | Performed by: SURGERY

## 2017-11-13 PROCEDURE — 76010000934 HC OR TIME 1 TO 1.5HR INTENSV - TIER 2: Performed by: SURGERY

## 2017-11-13 RX ORDER — GLYCOPYRROLATE 0.2 MG/ML
INJECTION INTRAMUSCULAR; INTRAVENOUS AS NEEDED
Status: DISCONTINUED | OUTPATIENT
Start: 2017-11-13 | End: 2017-11-13 | Stop reason: HOSPADM

## 2017-11-13 RX ORDER — HYDROMORPHONE HYDROCHLORIDE 1 MG/ML
0.5 INJECTION, SOLUTION INTRAMUSCULAR; INTRAVENOUS; SUBCUTANEOUS
Status: DISCONTINUED | OUTPATIENT
Start: 2017-11-13 | End: 2017-11-13 | Stop reason: HOSPADM

## 2017-11-13 RX ORDER — CEFAZOLIN SODIUM 1 G/3ML
INJECTION, POWDER, FOR SOLUTION INTRAMUSCULAR; INTRAVENOUS AS NEEDED
Status: DISCONTINUED | OUTPATIENT
Start: 2017-11-13 | End: 2017-11-13 | Stop reason: HOSPADM

## 2017-11-13 RX ORDER — ONDANSETRON 2 MG/ML
INJECTION INTRAMUSCULAR; INTRAVENOUS AS NEEDED
Status: DISCONTINUED | OUTPATIENT
Start: 2017-11-13 | End: 2017-11-13 | Stop reason: HOSPADM

## 2017-11-13 RX ORDER — DIPHENHYDRAMINE HYDROCHLORIDE 50 MG/ML
12.5 INJECTION, SOLUTION INTRAMUSCULAR; INTRAVENOUS AS NEEDED
Status: DISCONTINUED | OUTPATIENT
Start: 2017-11-13 | End: 2017-11-13 | Stop reason: HOSPADM

## 2017-11-13 RX ORDER — NEOSTIGMINE METHYLSULFATE 1 MG/ML
INJECTION INTRAVENOUS AS NEEDED
Status: DISCONTINUED | OUTPATIENT
Start: 2017-11-13 | End: 2017-11-13 | Stop reason: HOSPADM

## 2017-11-13 RX ORDER — OXYCODONE AND ACETAMINOPHEN 5; 325 MG/1; MG/1
1 TABLET ORAL
Qty: 10 TAB | Refills: 0 | Status: SHIPPED | OUTPATIENT
Start: 2017-11-13 | End: 2018-06-07

## 2017-11-13 RX ORDER — ALBUTEROL SULFATE 0.83 MG/ML
2.5 SOLUTION RESPIRATORY (INHALATION) AS NEEDED
Status: DISCONTINUED | OUTPATIENT
Start: 2017-11-13 | End: 2017-11-13 | Stop reason: HOSPADM

## 2017-11-13 RX ORDER — SUCCINYLCHOLINE CHLORIDE 20 MG/ML
INJECTION INTRAMUSCULAR; INTRAVENOUS AS NEEDED
Status: DISCONTINUED | OUTPATIENT
Start: 2017-11-13 | End: 2017-11-13 | Stop reason: HOSPADM

## 2017-11-13 RX ORDER — LIDOCAINE HYDROCHLORIDE 20 MG/ML
INJECTION, SOLUTION EPIDURAL; INFILTRATION; INTRACAUDAL; PERINEURAL AS NEEDED
Status: DISCONTINUED | OUTPATIENT
Start: 2017-11-13 | End: 2017-11-13 | Stop reason: HOSPADM

## 2017-11-13 RX ORDER — BUPIVACAINE HYDROCHLORIDE AND EPINEPHRINE 5; 5 MG/ML; UG/ML
INJECTION, SOLUTION EPIDURAL; INTRACAUDAL; PERINEURAL AS NEEDED
Status: DISCONTINUED | OUTPATIENT
Start: 2017-11-13 | End: 2017-11-13 | Stop reason: HOSPADM

## 2017-11-13 RX ORDER — FENTANYL CITRATE 50 UG/ML
INJECTION, SOLUTION INTRAMUSCULAR; INTRAVENOUS AS NEEDED
Status: DISCONTINUED | OUTPATIENT
Start: 2017-11-13 | End: 2017-11-13 | Stop reason: HOSPADM

## 2017-11-13 RX ORDER — MIDAZOLAM HYDROCHLORIDE 1 MG/ML
INJECTION, SOLUTION INTRAMUSCULAR; INTRAVENOUS AS NEEDED
Status: DISCONTINUED | OUTPATIENT
Start: 2017-11-13 | End: 2017-11-13 | Stop reason: HOSPADM

## 2017-11-13 RX ORDER — SODIUM CHLORIDE, SODIUM LACTATE, POTASSIUM CHLORIDE, CALCIUM CHLORIDE 600; 310; 30; 20 MG/100ML; MG/100ML; MG/100ML; MG/100ML
125 INJECTION, SOLUTION INTRAVENOUS CONTINUOUS
Status: DISCONTINUED | OUTPATIENT
Start: 2017-11-13 | End: 2017-11-13 | Stop reason: HOSPADM

## 2017-11-13 RX ORDER — PROPOFOL 10 MG/ML
INJECTION, EMULSION INTRAVENOUS AS NEEDED
Status: DISCONTINUED | OUTPATIENT
Start: 2017-11-13 | End: 2017-11-13 | Stop reason: HOSPADM

## 2017-11-13 RX ORDER — ONDANSETRON 2 MG/ML
4 INJECTION INTRAMUSCULAR; INTRAVENOUS AS NEEDED
Status: DISCONTINUED | OUTPATIENT
Start: 2017-11-13 | End: 2017-11-13 | Stop reason: HOSPADM

## 2017-11-13 RX ORDER — ROCURONIUM BROMIDE 10 MG/ML
INJECTION, SOLUTION INTRAVENOUS AS NEEDED
Status: DISCONTINUED | OUTPATIENT
Start: 2017-11-13 | End: 2017-11-13 | Stop reason: HOSPADM

## 2017-11-13 RX ORDER — CEFAZOLIN SODIUM IN 0.9 % NACL 2 G/50 ML
2 INTRAVENOUS SOLUTION, PIGGYBACK (ML) INTRAVENOUS ONCE
Status: DISCONTINUED | OUTPATIENT
Start: 2017-11-13 | End: 2017-11-13 | Stop reason: HOSPADM

## 2017-11-13 RX ORDER — LIDOCAINE HYDROCHLORIDE 10 MG/ML
0.1 INJECTION, SOLUTION EPIDURAL; INFILTRATION; INTRACAUDAL; PERINEURAL AS NEEDED
Status: DISCONTINUED | OUTPATIENT
Start: 2017-11-13 | End: 2017-11-13 | Stop reason: HOSPADM

## 2017-11-13 RX ORDER — SODIUM CHLORIDE, SODIUM LACTATE, POTASSIUM CHLORIDE, CALCIUM CHLORIDE 600; 310; 30; 20 MG/100ML; MG/100ML; MG/100ML; MG/100ML
150 INJECTION, SOLUTION INTRAVENOUS CONTINUOUS
Status: DISCONTINUED | OUTPATIENT
Start: 2017-11-13 | End: 2017-11-13 | Stop reason: HOSPADM

## 2017-11-13 RX ADMIN — NEOSTIGMINE METHYLSULFATE 3 MG: 1 INJECTION INTRAVENOUS at 17:05

## 2017-11-13 RX ADMIN — MIDAZOLAM HYDROCHLORIDE 2 MG: 1 INJECTION, SOLUTION INTRAMUSCULAR; INTRAVENOUS at 16:19

## 2017-11-13 RX ADMIN — CEFAZOLIN SODIUM 2 G: 1 INJECTION, POWDER, FOR SOLUTION INTRAMUSCULAR; INTRAVENOUS at 16:20

## 2017-11-13 RX ADMIN — ONDANSETRON 4 MG: 2 INJECTION INTRAMUSCULAR; INTRAVENOUS at 16:42

## 2017-11-13 RX ADMIN — FENTANYL CITRATE 100 MCG: 50 INJECTION, SOLUTION INTRAMUSCULAR; INTRAVENOUS at 16:31

## 2017-11-13 RX ADMIN — SODIUM CHLORIDE, POTASSIUM CHLORIDE, SODIUM LACTATE AND CALCIUM CHLORIDE 150 ML/HR: 600; 310; 30; 20 INJECTION, SOLUTION INTRAVENOUS at 13:29

## 2017-11-13 RX ADMIN — LIDOCAINE HYDROCHLORIDE 60 MG: 20 INJECTION, SOLUTION EPIDURAL; INFILTRATION; INTRACAUDAL; PERINEURAL at 16:24

## 2017-11-13 RX ADMIN — GLYCOPYRROLATE 0.2 MG: 0.2 INJECTION INTRAMUSCULAR; INTRAVENOUS at 17:14

## 2017-11-13 RX ADMIN — SUCCINYLCHOLINE CHLORIDE 140 MG: 20 INJECTION INTRAMUSCULAR; INTRAVENOUS at 16:24

## 2017-11-13 RX ADMIN — NEOSTIGMINE METHYLSULFATE 1 MG: 1 INJECTION INTRAVENOUS at 17:14

## 2017-11-13 RX ADMIN — ONDANSETRON 4 MG: 2 INJECTION INTRAMUSCULAR; INTRAVENOUS at 17:57

## 2017-11-13 RX ADMIN — FENTANYL CITRATE 100 MCG: 50 INJECTION, SOLUTION INTRAMUSCULAR; INTRAVENOUS at 16:24

## 2017-11-13 RX ADMIN — ROCURONIUM BROMIDE 25 MG: 10 INJECTION, SOLUTION INTRAVENOUS at 16:30

## 2017-11-13 RX ADMIN — ROCURONIUM BROMIDE 10 MG: 10 INJECTION, SOLUTION INTRAVENOUS at 16:35

## 2017-11-13 RX ADMIN — GLYCOPYRROLATE 0.5 MG: 0.2 INJECTION INTRAMUSCULAR; INTRAVENOUS at 17:05

## 2017-11-13 RX ADMIN — ROCURONIUM BROMIDE 5 MG: 10 INJECTION, SOLUTION INTRAVENOUS at 16:24

## 2017-11-13 RX ADMIN — SODIUM CHLORIDE, POTASSIUM CHLORIDE, SODIUM LACTATE AND CALCIUM CHLORIDE: 600; 310; 30; 20 INJECTION, SOLUTION INTRAVENOUS at 17:21

## 2017-11-13 RX ADMIN — PROPOFOL 200 MG: 10 INJECTION, EMULSION INTRAVENOUS at 16:24

## 2017-11-13 RX ADMIN — FENTANYL CITRATE 50 MCG: 50 INJECTION, SOLUTION INTRAMUSCULAR; INTRAVENOUS at 16:44

## 2017-11-13 NOTE — ANESTHESIA POSTPROCEDURE EVALUATION
Post-Anesthesia Evaluation and Assessment    Patient: Luigi Watts MRN: [id number]  SSN: [id number]    YOB: 1975  Age: 38 y.o. Sex: male       Cardiovascular Function/Vital Signs  Visit Vitals    /65    Pulse 91    Temp 36.9 °C (98.4 °F)    Resp 24    Ht 5' 9.5\" (1.765 m)    Wt 97.2 kg (214 lb 4.6 oz)    SpO2 95%    BMI 31.19 kg/m2       Patient is status post MAC anesthesia for Procedure(s):  DIAGNOSTIC LAPAROSCOPY, PARACENTESIS, CORE BIOPSY OF LEFT LOWER LOBE. Nausea/Vomiting: None    Postoperative hydration reviewed and adequate. Pain:  Pain Scale 1: Numeric (0 - 10) (11/13/17 1810)  Pain Intensity 1: 0 (11/13/17 1810)   Managed    Neurological Status:   Neuro (WDL): Within Defined Limits (11/13/17 1810)  Neuro  Neurologic State: Drowsy (11/13/17 1725)  LUE Motor Response: Purposeful (11/13/17 1725)  LLE Motor Response: Purposeful (11/13/17 1725)  RUE Motor Response: Purposeful (11/13/17 1725)  RLE Motor Response: Purposeful (11/13/17 1725)   At baseline    Mental Status and Level of Consciousness: Arousable    Pulmonary Status:   O2 Device: Room air (11/13/17 1810)   Adequate oxygenation and airway patent    Complications related to anesthesia: None    Post-anesthesia assessment completed.  No concerns    Signed By: Amanda Toussaint MD     November 13, 2017

## 2017-11-13 NOTE — INTERVAL H&P NOTE
H&P Update:  Elida Hu was seen and examined. History and physical has been reviewed. The patient has been examined.  There have been no significant clinical changes since the completion of the originally dated History and Physical.    Signed By: Jamshid Alfonso MD     November 13, 2017 4:02 PM

## 2017-11-13 NOTE — H&P (VIEW-ONLY)
Surgery History and Physical    Subjective:      Vasquez Garcia is a 38 y.o. black male who presents for evaluation of an umbilical hernia. Mr. Dana Castro has recently lost a lot of weight. He has now noticed a bulge at his bellybutton which is getting bigger. 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 The bulge fluctuates in size, but never completely goes away completely. He is eating fine and moving his bowels normally. He denies any previous hernia repairs or abdominal surgery. He does smoke a cigar on occasion. Past Medical History:   Diagnosis Date    Diabetes ([address]     Hypertension     Obesity (BMI 30.0-34. 9)     Psoriasis      No past surgical history on file. Family History   Problem Relation Age of Onset    Cancer Mother      Stomach    Diabetes Father     Diabetes Brother     Hypertension Neg Hx      Social History   Substance Use Topics    Smoking status: Light Tobacco Smoker     Types: Cigars    Smokeless tobacco: Never Used      Comment: x1 week    Alcohol use 1.8 oz/week     3 Cans of beer per week      Prior to Admission medications    Medication Sig Start Date End Date Taking? Authorizing Provider   aspirin delayed-release 81 mg tablet Take 1 Tab by mouth daily. Yes Ramiro Snider MD   amLODIPine (NORVASC) 5 mg tablet Take 1 Tab by mouth daily. 10/27/17  Yes Ramiro Snider MD   fluocinoNIDE (LIDEX) 0.05 % ointment APPLY TO AFFECTED AREA 2 TIMES DAILY TO [facility] 8/7/17   Historical Provider      Allergies   Allergen Reactions    Metformin Other (comments)     Cannot tolerate pill size       Review of Systems:  A comprehensive review of systems was negative except for that written in the History of Present Illness.     Objective:      Physical Exam:  GENERAL: alert, cooperative, no distress, appears stated age, EYE: negative findings: anicteric sclera, LYMPHATIC: Cervical, supraclavicular nodes normal. , THROAT & NECK: neck supple and symmetrical.  The thyroid is grossly normal., LUNG: clear to auscultation bilaterally, HEART: regular rate and rhythm, ABDOMEN: Soft, obese, ND. There is a tender, incarcerated umbilical hernia., EXTREMITIES:  no edema, SKIN: Normal., NEUROLOGIC: negative, PSYCHIATRIC: non focal    Assessment:     Incarcerated umbilical hernia without gangrene or obstruction. Plan:     Mr. Escobar Hayden is eager to have his hernia repaired. I discussed the risks of the procedure including bleeding, infection, wound healing problems, recurrence of the hernia, seroma, injury to the bowel, blood clots, and reaction to the prep or local and general anesthetic. He understands the risks; any and all questions were answered to his satisfaction. Mr. Escobar Hayden will be scheduled for an elective outpatient robotic-assisted laparoscopic umbilical herniorrhaphy with mesh, possible open under general anesthesia.     Signed By: Ham Otto MD     November 6, 2017

## 2017-11-13 NOTE — IP AVS SNAPSHOT
[address] [place] [address] 
[phone] Patient: Vasquez Garcia MRN: [id number] About your hospitalization You were admitted on:  November 13, 2017 You last received care in the:  [facility] PACU You were discharged on:  November 13, 2017 Why you were hospitalized Your primary diagnosis was:  Not on File Things You Need To Do (next 8 weeks) Follow up with Ramiro Snider MD  
  
Phone:  [phone] Where:  [address], [address], [facility] [place] [address] [place] [address] Wednesday Nov 29, 2017 POST OP with Efrem Paredes NP at  9:50 AM  
Where: [address] [facility] Surgery ([facility]) Discharge Orders None A check radha indicates which time of day the medication should be taken. My Medications TAKE these medications as instructed Instructions Each Dose to Equal  
 Morning Noon Evening Bedtime  
 amLODIPine 5 mg tablet Commonly known as:  Rimma Croon Your last dose was: Your next dose is: Take 1 Tab by mouth daily. 5 mg  
    
   
   
   
  
 aspirin delayed-release 81 mg tablet Your last dose was: Your next dose is: Take 1 Tab by mouth daily. 81 mg  
    
   
   
   
  
 oxyCODONE-acetaminophen 5-325 mg per tablet Commonly known as:  PERCOCET Your last dose was: Your next dose is: Take 1 Tab by mouth every four (4) hours as needed for Pain. Max Daily Amount: 6 Tabs. 1 Tab Where to Get Your Medications Information on where to get these meds will be given to you by the nurse or doctor. ! Ask your nurse or doctor about these medications  
  oxyCODONE-acetaminophen 5-325 mg per tablet Discharge Instructions Abdominal Hernia Repair: What to Expect at Home Your Recovery After surgery to repair your hernia, you are likely to have pain for a few days. You may also feel like you have the flu, and you may have a low fever and feel tired and nauseated. This is common. You should feel better after a few days and will probably feel much better in 7 days. For several weeks you may feel twinges or pulling in the hernia repair when you move. You may have some bruising around the area of your hernia repair. This is normal. 
This care sheet gives you a general idea about how long it will take for you to recover, but each person recovers at a different pace. Follow the steps below to get better as quickly as possible. How can you care for yourself at home? Activity ? · Rest when you feel tired. Getting enough sleep will help you recover. ? · Try to walk each day. Start by walking a little more than you did the day before. Bit by bit, increase the amount you walk. Walking boosts blood flow and helps prevent pneumonia and constipation. ? · If your doctor gives you an abdominal binder to wear, use it as directed. This is an elastic bandage that wraps around your belly and upper hips. It helps support your belly muscles after surgery. ? · Avoid strenuous activities, such as biking, jogging, weight lifting, or aerobic exercise, until your doctor says it is okay. ? · Avoid lifting anything over 30 pounds or that would make you strain for 6 weeks! This may include heavy grocery bags and milk containers, a heavy briefcase or backpack, cat litter or dog food bags, a vacuum , or a child. ? · Ask your doctor when you can drive again. ? · Most people are able to return to work within 1 to 2 weeks after surgery, but if your job requires that you to do heavy lifting or strenuous activity, you may need to take 4 to 6 weeks off from work. ? · You may shower 24 hours after surgery, if your doctor okays it.   Shaggy Perry the cuts (incisions) dry. Do not take a bath for the first 2 weeks, and until after seen by your doctor. ? · Diet ? · You can eat your normal diet. If your stomach is upset, try bland, low-fat foods like plain rice, broiled chicken, toast, and yogurt. ? · Drink plenty of fluids (unless your doctor tells you not to). ? · You may notice that your bowel movements are not regular right after your surgery. This is common. Avoid constipation and straining with bowel movements. You may want to take a fiber supplement every day. If you have not had a bowel movement after a couple of days, ask your doctor about taking a mild laxative. Medicines ? · You can restart your medicines. Your doctor will also give you instructions about taking any new medicines. ? · If you take blood thinners, such as warfarin (Coumadin), be sure to talk to your doctor. Your doctor will tell you if and when to start taking those medicines again. Make sure that you understand exactly what your doctor wants you to do. ? · Be safe with medicines. Take pain medicines exactly as directed. ¨ If the doctor gave you a prescription medicine for pain, take it as prescribed. ¨ If you are not taking a prescription pain medicine, ask your doctor if you can take an over-the-counter medicine. ? ·   
? · If you think your pain medicine is making you sick to your stomach: 
¨ Take your medicine after meals (unless your doctor has told you not to). ¨ Ask your doctor for a different pain medicine. Incision care ? · Remove your bandages on Wednesday, 11/15. You may leave your incisions uncovered. · If you have strips of tape on the cut (incision) the doctor made, leave the tape on for a week or until it falls off.   
? · Keep the incisions clean and dry. ? · Wash the area daily with warm, soapy water, and pat it dry. Don't use hydrogen peroxide or alcohol, which can slow healing.   You may cover the area with a gauze bandage if it weeps or rubs against clothing. Change the bandage every day. Other instructions ? · Hold a pillow over your incision when you cough or take deep breaths. This will support your belly and decrease your pain. ? · Do breathing exercises at home as instructed by your doctor. This will help prevent pneumonia. ? · If you had laparoscopic surgery, you may also have pain in your left shoulder. The pain usually lasts about a day or two. Follow-up care is a key part of your treatment and safety. Be sure to make and go to all appointments, and call your doctor if you are having problems. It's also a good idea to know your test results and keep a list of the medicines you take. When should you call for help? Call 911 anytime you think you may need emergency care. For example, call if: 
? · You passed out (lost consciousness). ? · You are short of breath. ?Call your doctor now or seek immediate medical care if: 
? · You are sick to your stomach and cannot drink fluids. ? · You have signs of a blood clot in your leg (called a deep vein thrombosis), such as: 
¨ Pain in your calf, back of the knee, thigh, or groin. ¨ Redness and swelling in your leg or groin. ? · You have signs of infection, such as: 
¨ Increased pain, swelling, warmth, or redness. ¨ Red streaks leading from the incision. ¨ Pus draining from the incision. ¨ A fever > 101.  
? · You cannot pass stools or gas. ? · You have abdominal pain that does not get better after you take pain medicine. ? · You have loose stitches, or your incision comes open. ? · Bright red blood has soaked through the bandage over your incision. ? Watch closely for changes in your health, and be sure to contact your doctor if you have any problems. Where can you learn more? Go to http://garth-el.info/.  
Enter B577 in the search box to learn more about \"Abdominal Hernia Repair: What to Expect at Home. \" Current as of: May 12, 2017 Content Version: 11.4 © 1966-1671 [employer]. Care instructions adapted under license by [employer] (which disclaims liability or warranty for this information). If you have questions about a medical condition or this instruction, always ask your healthcare professional. Maryantonioägen Isela any warranty or liability for your use of this information. Verna Sanches MD, FACS General Surgery at [address], [address] [facility] 
[phone] Fax [phone] DISCHARGE SUMMARY from your Nurse The following personal items collected during your admission are returned to you:  
Dental Appliance: Dental Appliances: None Vision: Visual Aid: None Hearing Aid:   
Jewelry: Jewelry: None Clothing: Clothing: Footwear, Pants, Shirt, Jacket/Coat, Undergarments Other Valuables: Other Valuables: None Valuables sent to safe:   
 
PATIENT INSTRUCTIONS: 
 
After general anesthesia or intravenous sedation, for 24 hours or while taking prescription Narcotics: · Limit your activities · Do not drive and operate hazardous machinery · Do not make important personal or business decisions · Do  not drink alcoholic beverages · If you have not urinated within 8 hours after discharge, please contact your surgeon on call. Report the following to your surgeon: 
· Excessive pain, swelling, redness or odor of or around the surgical area · Temperature over 100.5 · Nausea and vomiting lasting longer than 4 hours or if unable to take medications · Any signs of decreased circulation or nerve impairment to extremity: change in color, persistent  numbness, tingling, coldness or increase pain · Any questions [address] Breathing deep and coughing are very important exercises to do after surgery. Deep breathing and coughing open the little air tubes and air sacks in your lungs. You take deep breaths every day. You may not even notice - it is just something you do when you sigh or yawn. It is a natural exercise you do to keep these air passages open. After surgery, take deep breaths and cough, on purpose. Coughing and deep breathing help prevent bronchitis and pneumonia after surgery. If you had chest or belly surgery, use a pillow as a \"hug sriram\" and hold it tightly to your chest or belly when you cough. DIRECTIONS: 
6. Take 10 to 15 slow deep breaths every hour while awake. 7. Breathe in deeply, and hold it for 2 seconds. 8. Exhale slowly through puckered lips, like blowing up a balloon. 9. After every 4th or 5th deep breath, hug your pillow to your chest or belly and give a hard, deep cough. Yes, it will probably hurt. But doing this exercise is very important part of healing after surgery. Take your pain medicine to help you do this exercise without too much pain. IF YOU HAVE BEEN DIAGNOSED WITH SLEEP APNEA, PLEASE USE YOUR SLEEP APNEA DEVICE OR CPAP MACHINE WHEN YOU INTEND TO NAP AFTER TAKING PAIN MEDICATION. Ankle Pumps Ankle pumps increase the circulation of oxygenated blood to your lower extremities and decrease your risk for circulation problems such as blood clots. They also stretch the muscles, tendons and ligaments in your foot and ankle, and prevent joint contracture in the ankle and foot, especially after surgeries on the legs. It is important to do ankle pump exercises regularly after surgery because immobility increases your risk for developing a blood clot. Your doctor may also have you take an Aspirin for the next few days as well. If your doctor did not ask you to take an Aspirin, consult with him before starting Aspirin therapy on your own.  
 
 
Slowly point your foot forward, feeling the muscles on the top of your lower leg stretch, and hold this position for 5 seconds. Next, pull your foot back toward you as far as possible, stretching the calf muscles, and hold that position for 5 seconds. Repeat with the other foot. Perform 10 repetitions every hour while awake for both ankles if possible (down and then up with the foot once is one repetition). You should feel gentle stretching of the muscles in your lower leg when doing this exercise. If you feel pain, or your range of motion is limited, don't  Push too hard. Only go the limit your joint and muscles will let you go. If you have increasing pain, progressively worsening leg warmth or swelling, STOP the exercise and call your doctor. Below is information about the medications your doctor is prescribing after your visit: 
 
 
· percocet Introducing [facility]! Isidoro Mcneill introduces [employer] patient portal. Now you can access parts of your medical record, email your doctor's office, and request medication refills online. 1. In your internet browser, go to [email or web address]. [email or web address] 2. Click on the First Time User? Click Here link in the Sign In box. You will see the New Member Sign Up page. 3. Enter your [employer]-Play Access Code exactly as it appears below. You will not need to use this code after youve completed the sign-up process. If you do not sign up before the expiration date, you must request a new code. · [employer] Access Code: [email or web address] Expires: 1/25/2018 12:32 PM 
 
4. Enter the last four digits of your Social Security Number (xxxx) and Date of Birth (mm/dd/yyyy) as indicated and click Submit. You will be taken to the next sign-up page. 5. Create a IX[employer]-Play ID. This will be your [employer]-Play login ID and cannot be changed, so think of one that is secure and easy to remember. 6. Create a [employer] password. You can change your password at any time. 7. Enter your Password Reset Question and Answer. This can be used at a later time if you forget your password. 8. Enter your e-mail address. You will receive e-mail notification when new information is available in [address] 9. Click Sign Up. You can now view and download portions of your medical record. 10. Click the Download Summary menu link to download a portable copy of your medical information. If you have questions, please visit the Frequently Asked Questions section of the [employer] website. Remember, [employer]-Play is NOT to be used for urgent needs. For medical emergencies, dial 911. Now available from your iPhone and Android! Unresulted Labs-Please follow up with your PCP about these lab tests Order Current Status CULTURE, BODY FLUID W GRAM STAIN In process Providers Seen During Your Hospitalization Provider Specialty Primary office phone Meghan Vela MD Surgery [phone] Your Primary Care Physician (PCP) Primary Care Physician Office Phone Office Fax Kirk Cabrandon [phone] You are allergic to the following Allergen Reactions Metformin Other (comments) Cannot tolerate pill size Recent Documentation Height Weight BMI Smoking Status 1.765 m 97.2 kg 31.19 kg/m2 Light Tobacco Smoker Emergency Contacts Name Discharge Info Relation Home Work Mobile [facility] DISCHARGE CAREGIVER [3] Sister [23]   [phone] [facility] DISCHARGE CAREGIVER [3] Brother [24]   [phone] Denis Cadena Jr DISCHARGE CAREGIVER [3] Other Relative [6]   [phone] Patient Belongings The following personal items are in your possession at time of discharge: 
  Dental Appliances: None  Visual Aid: None      Home Medications: None   Jewelry: None  Clothing: Footwear, Pants, Shirt, Jacket/Coat, Undergarments    Other Valuables: None Please provide this summary of care documentation to your next provider. Signatures-by signing, you are acknowledging that this After Visit Summary has been reviewed with you and you have received a copy. Patient Signature:  ____________________________________________________________ Date:  ____________________________________________________________  
  
Alessio Artist Provider Signature:  ____________________________________________________________ Date:  ____________________________________________________________

## 2017-11-13 NOTE — BRIEF OP NOTE
BRIEF OPERATIVE NOTE    Date of Procedure: 11/13/2017   Preoperative Diagnosis: INCARCERATED UMBILICAL HERNIA  Postoperative Diagnosis: 1. INCARCERATED UMBILICAL HERNIA, 2. CIRRHOSIS WITH ASCITES. Procedure(s):  DIAGNOSTIC LAPAROSCOPY, LAPAROSCOPIC PARACENTESIS, LAPAROSCOPIC GUIDED PERCUTANEOUS CORE NEEDLE BIOPSY OF THE LEFT LOBE OF THE LIVER X 3. Surgeon(s) and Role:     * Ham Otto MD - Primary         Assistant Staff:       Surgical Staff:  Circ-1: Thad Goodman RN  Circ-Intern: Beena Saleh  Scrub Tech-1: Jose Ramon Falcon  Scrub Tech-Relief: Star Makua  Surg Asst-1: Giovanna Heredia LPN  Float Staff: Lei Sky  Event Time In   Incision Start 1642   Incision Close      Anesthesia: General   Estimated Blood Loss: Less than 25 ml. Specimens:   ID Type Source Tests Collected by Time Destination   1 : CORE BIOPSY OF LIVER-LEFT LOWER LOBE #1 Preservative Liver  Ham Otto MD 11/13/2017 8075 Pathology   2 : CORE BIOPSY OF LIVER-LEFT LOWER LOBE #2 Preservative Liver  Ham Otto MD 11/13/2017 1710 Pathology   1 : PERITONEAL FLUID Body Fluid Peritoneal Fluid CULTURE, BODY FLUID, CULTURE, ANAEROBIC, CULTURE, ANAEROBIC AND AEROBIC Ham Otto MD 11/13/2017 1655 Microbiology   1 : PERITONEAL FLUID Fresh Peritoneal Fluid  Ham Otto MD 11/13/2017 1652 Cytology      Findings: 1. Turbid ascites., 2. Cirrhotic-appearing liver. Complications: None. Implants: * No implants in log *   Disposition: Stable to the .

## 2017-11-13 NOTE — DISCHARGE INSTRUCTIONS
Abdominal Hernia Repair: What to Expect at Home  Your Recovery  After surgery to repair your hernia, you are likely to have pain for a few days. You may also feel like you have the flu, and you may have a low fever and feel tired and nauseated. This is common. You should feel better after a few days and will probably feel much better in 7 days. For several weeks you may feel twinges or pulling in the hernia repair when you move. You may have some bruising around the area of your hernia repair. This is normal.  This care sheet gives you a general idea about how long it will take for you to recover, but each person recovers at a different pace. Follow the steps below to get better as quickly as possible. How can you care for yourself at home? Activity  ? · Rest when you feel tired. Getting enough sleep will help you recover. ? · Try to walk each day. Start by walking a little more than you did the day before. Bit by bit, increase the amount you walk. Walking boosts blood flow and helps prevent pneumonia and constipation. ? · If your doctor gives you an abdominal binder to wear, use it as directed. This is an elastic bandage that wraps around your belly and upper hips. It helps support your belly muscles after surgery. ? · Avoid strenuous activities, such as biking, jogging, weight lifting, or aerobic exercise, until your doctor says it is okay. ? · Avoid lifting anything over 30 pounds or that would make you strain for 6 weeks! This may include heavy grocery bags and milk containers, a heavy briefcase or backpack, cat litter or dog food bags, a vacuum , or a child. ? · Ask your doctor when you can drive again. ? · Most people are able to return to work within 1 to 2 weeks after surgery, but if your job requires that you to do heavy lifting or strenuous activity, you may need to take 4 to 6 weeks off from work. ? · You may shower 24 hours after surgery, if your doctor okays it. Pat the cuts (incisions) dry. Do not take a bath for the first 2 weeks, and until after seen by your doctor. ? ·    Diet  ? · You can eat your normal diet. If your stomach is upset, try bland, low-fat foods like plain rice, broiled chicken, toast, and yogurt. ? · Drink plenty of fluids (unless your doctor tells you not to). ? · You may notice that your bowel movements are not regular right after your surgery. This is common. Avoid constipation and straining with bowel movements. You may want to take a fiber supplement every day. If you have not had a bowel movement after a couple of days, ask your doctor about taking a mild laxative. Medicines  ? · You can restart your medicines. Your doctor will also give you instructions about taking any new medicines. ? · If you take blood thinners, such as warfarin (Coumadin), be sure to talk to your doctor. Your doctor will tell you if and when to start taking those medicines again. Make sure that you understand exactly what your doctor wants you to do. ? · Be safe with medicines. Take pain medicines exactly as directed. ¨ If the doctor gave you a prescription medicine for pain, take it as prescribed. ¨ If you are not taking a prescription pain medicine, ask your doctor if you can take an over-the-counter medicine. ? ·    ? · If you think your pain medicine is making you sick to your stomach:  ¨ Take your medicine after meals (unless your doctor has told you not to). ¨ Ask your doctor for a different pain medicine. Incision care  ? · Remove your bandages on Wednesday, 11/15. You may leave your incisions uncovered. · If you have strips of tape on the cut (incision) the doctor made, leave the tape on for a week or until it falls off.    ? · Keep the incisions clean and dry. ? · Wash the area daily with warm, soapy water, and pat it dry. Don't use hydrogen peroxide or alcohol, which can slow healing.   You may cover the area with a gauze bandage if it weeps or rubs against clothing. Change the bandage every day. Other instructions  ? · Hold a pillow over your incision when you cough or take deep breaths. This will support your belly and decrease your pain. ? · Do breathing exercises at home as instructed by your doctor. This will help prevent pneumonia. ? · If you had laparoscopic surgery, you may also have pain in your left shoulder. The pain usually lasts about a day or two. Follow-up care is a key part of your treatment and safety. Be sure to make and go to all appointments, and call your doctor if you are having problems. It's also a good idea to know your test results and keep a list of the medicines you take. When should you call for help? Call 911 anytime you think you may need emergency care. For example, call if:  ? · You passed out (lost consciousness). ? · You are short of breath. ?Call your doctor now or seek immediate medical care if:  ? · You are sick to your stomach and cannot drink fluids. ? · You have signs of a blood clot in your leg (called a deep vein thrombosis), such as:  ¨ Pain in your calf, back of the knee, thigh, or groin. ¨ Redness and swelling in your leg or groin. ? · You have signs of infection, such as:  ¨ Increased pain, swelling, warmth, or redness. ¨ Red streaks leading from the incision. ¨ Pus draining from the incision. ¨ A fever > 101.   ? · You cannot pass stools or gas. ? · You have abdominal pain that does not get better after you take pain medicine. ? · You have loose stitches, or your incision comes open. ? · Bright red blood has soaked through the bandage over your incision. ? Watch closely for changes in your health, and be sure to contact your doctor if you have any problems. Where can you learn more? Go to http://garth-el.info/. Enter B577 in the search box to learn more about \"Abdominal Hernia Repair: What to Expect at Home. \"  Current as of:  May 12, 2017  Content Version: 11.4  © 2698-8073 [employer]. Care instructions adapted under license by [employer] (which disclaims liability or warranty for this information). If you have questions about a medical condition or this instruction, always ask your healthcare professional. [address] any warranty or liability for your use of this information. Al Deras. Vinay Soto MD, FACS  General Surgery at [facility], [address] [place] [address], [facility], [address]  [phone]  Fax [phone]    DISCHARGE SUMMARY from your Nurse    The following personal items collected during your admission are returned to you:   Dental Appliance: Dental Appliances: None  Vision: Visual Aid: None  Hearing Aid:    Jewelry: Jewelry: None  Clothing: Clothing: Footwear, Pants, Shirt, Jacket/Coat, Undergarments  Other Valuables: Other Valuables: None  Valuables sent to safe:      PATIENT INSTRUCTIONS:    After general anesthesia or intravenous sedation, for 24 hours or while taking prescription Narcotics:  · Limit your activities  · Do not drive and operate hazardous machinery  · Do not make important personal or business decisions  · Do  not drink alcoholic beverages  · If you have not urinated within 8 hours after discharge, please contact your surgeon on call. Report the following to your surgeon:  · Excessive pain, swelling, redness or odor of or around the surgical area  · Temperature over 100.5  · Nausea and vomiting lasting longer than 4 hours or if unable to take medications  · Any signs of decreased circulation or nerve impairment to extremity: change in color, persistent  numbness, tingling, coldness or increase pain  · Any questions    COUGH AND DEEP BREATHE    Breathing deep and coughing are very important exercises to do after surgery. Deep breathing and coughing open the little air tubes and air sacks in your lungs. You take deep breaths every day. You may not even notice - it is just something you do when you sigh or yawn. It is a natural exercise you do to keep these air passages open. After surgery, take deep breaths and cough, on purpose. Coughing and deep breathing help prevent bronchitis and pneumonia after surgery. If you had chest or belly surgery, use a pillow as a \"hug sriram\" and hold it tightly to your chest or belly when you cough. DIRECTIONS:  6. Take 10 to 15 slow deep breaths every hour while awake. 7. Breathe in deeply, and hold it for 2 seconds. 8. Exhale slowly through puckered lips, like blowing up a balloon. 9. After every 4th or 5th deep breath, hug your pillow to your chest or belly and give a hard, deep cough. Yes, it will probably hurt. But doing this exercise is very important part of healing after surgery. Take your pain medicine to help you do this exercise without too much pain. IF YOU HAVE BEEN DIAGNOSED WITH SLEEP APNEA, PLEASE USE YOUR SLEEP APNEA DEVICE OR CPAP MACHINE WHEN YOU INTEND TO NAP AFTER TAKING PAIN MEDICATION. Ankle Pumps    Ankle pumps increase the circulation of oxygenated blood to your lower extremities and decrease your risk for circulation problems such as blood clots. They also stretch the muscles, tendons and ligaments in your foot and ankle, and prevent joint contracture in the ankle and foot, especially after surgeries on the legs. It is important to do ankle pump exercises regularly after surgery because immobility increases your risk for developing a blood clot. Your doctor may also have you take an Aspirin for the next few days as well. If your doctor did not ask you to take an Aspirin, consult with him before starting Aspirin therapy on your own. Slowly point your foot forward, feeling the muscles on the top of your lower leg stretch, and hold this position for 5 seconds.                   Next, pull your foot back toward you as far as possible, stretching the calf muscles, and hold that position for 5 seconds. Repeat with the other foot. Perform 10 repetitions every hour while awake for both ankles if possible (down and then up with the foot once is one repetition). You should feel gentle stretching of the muscles in your lower leg when doing this exercise. If you feel pain, or your range of motion is limited, don't  Push too hard. Only go the limit your joint and muscles will let you go. If you have increasing pain, progressively worsening leg warmth or swelling, STOP the exercise and call your doctor. Below is information about the medications your doctor is prescribing after your visit:    Other information in your discharge envelope:  []     PRESCRIPTIONS  []     PHYSICAL THERAPY PRESCRIPTION  []     APPOINTMENT CARDS  []     Regional Anesthesia Pamphlet for block or block with On-Q Catheter from Anesthesia Service  []     Medical device information sheets/pamphlets from their    []     School/work excuse note. []     /parent work excuse note. These are general instructions for a healthy lifestyle:    *  Please give a list of your current medications to your Primary Care Provider. *  Please update this list whenever your medications are discontinued, doses are      changed, or new medications (including over-the-counter products) are added. *  Please carry medication information at all times in case of emergency situations. About Smoking  No smoking / No tobacco products / Avoid exposure to second hand smoke    Surgeon General's Warning:  Quitting smoking now greatly reduces serious risk to your health. Obesity, smoking, and sedentary lifestyle greatly increases your risk for illness and disease. A healthy diet, regular physical exercise & weight monitoring are important for maintaining a healthy lifestyle.     Congestive Heart Failure  You may be retaining fluid if you have a history of heart failure or if you experience any of the following symptoms:  Weight gain of 3 pounds or more overnight or 5 pounds in a week, increased swelling in our hands or feet or shortness of breath while lying flat in bed. Please call your doctor as soon as you notice any of these symptoms; do not wait until your next office visit. Recognize signs and symptoms of STROKE:  F - face looks uneven  A - arms unable to move or move even  S - speech slurred or non-existent  T - time-call 911 as soon as signs and symptoms begin-DO NOT go         Back to bed or wait to see if you get better-TIME IS BRAIN. Warning signs of HEART ATTACK  Call 911 if you have these symptoms    · Chest discomfort. Most heart attacks involve discomfort in the center of the chest that lasts more than a few minutes, or that goes away and comes back. It can feel like uncomfortable pressure, squeezing, fullness, or pain. · Discomfort in other areas of the upper body. Symptoms can include pain or discomfort in one or both        Arms, the back, neck, jaw, or stomach. ·  Shortness of breath with or without chest discomfort. · Other signs may include breaking out in a cold sweat, nausea, or lightheadedness    Don't wait more than five minutes to call 911 - MINUTES MATTER! Fast action can save your life. Calling 911 is almost always the fastest way to get lifesaving treatment. Emergency Medical Services staff can begin treatment when they arrive - up to an hour sooner than if someone gets to the hospital by car. REVA SE[facility]ILLA MEDICATION AND SIDE EFFECT GUIDE    The [facility] MEDICATION AND SIDE EFFECT GUIDE was provided to the PATIENT AND CARE PROVIDER.   Information provided includes instruction about drug purpose and common side effects for the following medications:    · percocet

## 2017-11-13 NOTE — ANESTHESIA PREPROCEDURE EVALUATION
Anesthetic History   No history of anesthetic complications            Review of Systems / Medical History  Patient summary reviewed, nursing notes reviewed and pertinent labs reviewed    Pulmonary          Smoker         Neuro/Psych   Within defined limits           Cardiovascular    Hypertension              Exercise tolerance: >4 METS  Comments: Not on beta blocker   GI/Hepatic/Renal  Within defined limits              Endo/Other    Diabetes         Other Findings              Physical Exam    Airway  Mallampati: II  TM Distance: 4 - 6 cm  Neck ROM: normal range of motion   Mouth opening: Normal     Cardiovascular  Regular rate and rhythm,  S1 and S2 normal,  no murmur, click, rub, or gallop  Rhythm: regular  Rate: normal         Dental  No notable dental hx    Comments: Crowded teeth   Pulmonary  Breath sounds clear to auscultation               Abdominal  GI exam deferred       Other Findings            Anesthetic Plan    ASA: 2  Anesthesia type: MAC            Anesthetic plan and risks discussed with: Patient

## 2017-11-14 ENCOUNTER — TELEPHONE (OUTPATIENT)
Dept: SURGERY | Age: 42
End: 2017-11-14

## 2017-11-14 PROBLEM — R18.8 ASCITES: Status: ACTIVE | Noted: 2017-11-14

## 2017-11-14 PROBLEM — K74.60 CIRRHOSIS (HCC): Status: ACTIVE | Noted: 2017-11-14

## 2017-11-14 NOTE — TELEPHONE ENCOUNTER
Per Dr. Uday Desai, I called to check on patient to see how he was doing after his surgery yesterday. He states he  is doing fine, a little pain on left side of abdomen-pain level 2. He states there was a problem with [facility] and he was unable to fill pain medication prescription so he is taking Advil and that is working. He says he is eating and drinking fine. Post op appointment confirmed. Patient was instructed to call if he has any concerns prior to appointment.

## 2017-11-14 NOTE — OP NOTES
Norman Strickland [facility]   [facility], [address]   OP NOTE       Name:  Nell Snyder   MR#:  [id number]   :  1975   Account #:  [de-identified]    Surgery Date:  2017   Date of Adm:  2017         SURGEON:   Millicent Puentes. Pan Castano MD.    Vivian George. ANESTHESIA:   1. General endotracheal.   2. 0.5% Marcaine. PREOPERATIVE DIAGNOSIS:   Incarcerated umbilical hernia. POSTOPERATIVE DIAGNOSES:   1. Incarcerated umbilical hernia. 2. Cirrhosis with ascites. PROCEDURES:   1. Diagnostic laparoscopy. 2. Laparoscopic paracentesis. 3. Laparoscopic-guided percutaneous needle core biopsy of the left lobe of the liver x 3. INDICATION:   Mr. Derek Akers is a 51-year-old black gentleman who is presented to the office with complaints of an umbilical hernia which has been present for several months. He is having increasing discomfort whenever he works because he does a lot of lifting. Clinically, he appears to have an incarcerated umbilical hernia. He now presents at this time for his elective repair. Of note, he did given an extensive history of alcohol in the past.    PROCEDURE IN DETAIL:   The patient was seen preoperatively in the holding area. The risks, benefits, and expected outcomes were discussed with the patient, and all questions were answered satisfactorily. The patient concurred with the proposed plan,   giving informed consent. The patient was taken to the OR. The patient was identified as Camilla Jones, and the procedure verified as Robotic-assisted laparoscopic umbilical herniorrhaphy with mesh, possible open. The patient was placed on the OR table in the supine position. Prior to induction, antibiotic prophylaxis was administered. General anesthesia was administered and tolerated well.  The patient's abdomen was prepped with ChloraPrep and draped in the usual sterile fashion with Ioban placed over the skin.  A time-out was performed, and the above information was confirmed. The local anesthetic was injected into the skin and subcutaneous tissue in the left mid abdomen. Using a 15 blade, an incision was made. Towel clips were used to elevate the patient's abdominal wall. Using the Optiview technique, an 8-mm trocar was introduced into the abdomen. Insufflation was provided through this trocar to establish a   pneumoperitoneum of 15 mmHg, which the patient tolerated. Immediately upon entering the peritoneal cavity, there was turbid ascitic fluid noted in all quadrants. There was a small defect at the umbilicus, but no incarcerated omentum was noted. As I inspected the upper abdomen. The liver appeared diffusely nodular and cirrhotic in appearance. At this time, I decided it was best not to perform a hernia repair. The local anesthetic was injected into the left upper quadrant, and a second 8-mm trocar was placed. Peritoneal fluid was obtained. Two specimens were sent off, one for routine gram stain and cultures and the second for cytology. A small stab incision was made to the left upper quadrant. Using a Derek-Cut needle biopsy, 3 core needle biopsies   were obtained of the left lobe of the liver. Hemostasis was obtained along the liver as needed with pressure, and a piece of Surgicel was placed on top. The remainder of the ascitic fluid was suctioned out as best possible, collecting at least of liter in all. The left upper quadrant trocar was removed under direct laparoscopic vision, and there was no bleeding was noted internally. The laparoscope was removed, and the abdomen was decompressed. The left mid abdomen trocar was then removed. Hemostasis was obtained within all wounds as needed with cautery. The skin at all sites was closed with 4-0 Monocryl in the usual subcuticular fashion. The wounds were cleaned and dried, and Steri-Strips and Band-Aids were applied.       The patient was extubated in the room. ESTIMATED BLOOD LOSS:   Less than 25 mL. SPECIMENS:   1. Peritoneal fluid, which was sent off for routine gram stain and cultures to Microbiology. 2. Peritoneal fluid, which was sent off for cytology to Pathology. IMPLANTS:   None. FINDINGS:   1. A moderate amount of turbid serous ascitic fluid in all quadrants of the abdomen. 2. A grossly nodular liver suggestive of cirrhosis. 3. A small umbilical hernia with no incarcerated component. COUNTS:   All sponge, needle, and instrument counts correct x 2. COMPLICATIONS:   None. DISPOSITION:   The patient was transferred to the recovery room in stable condition, having tolerated the procedure and anesthesia well.         Marcelino Love.MD Reji / Kendall.Andrey   D:  11/14/2017   09:21   T:  11/14/2017   13:53   Job #:  [id number]

## 2017-11-19 LAB
BACTERIA SPEC CULT: ABNORMAL
GRAM STN SPEC: ABNORMAL
GRAM STN SPEC: ABNORMAL
SERVICE CMNT-IMP: ABNORMAL

## 2017-11-20 ENCOUNTER — DOCUMENTATION ONLY (OUTPATIENT)
Dept: SURGERY | Age: 42
End: 2017-11-20

## 2017-11-20 DIAGNOSIS — K70.31 ASCITES DUE TO ALCOHOLIC CIRRHOSIS (HCC): Primary | ICD-10-CM

## 2017-11-20 RX ORDER — LEVOFLOXACIN 500 MG/1
500 TABLET, FILM COATED ORAL DAILY
Qty: 10 TAB | Refills: 0 | Status: SHIPPED | OUTPATIENT
Start: 2017-11-20 | End: 2017-11-30

## 2017-11-20 NOTE — PROGRESS NOTES
11/20/17 - 215 PM    I spoke to Mr. Cadena. He is doing fine, but his abdomen is getting distended. I informed him of the pathology and culture results. I will refer him to GI and send a prescription for antibiotics to his pharmacy.

## 2017-12-04 ENCOUNTER — OFFICE VISIT (OUTPATIENT)
Dept: SURGERY | Age: 42
End: 2017-12-04

## 2017-12-04 VITALS
HEIGHT: 70 IN | DIASTOLIC BLOOD PRESSURE: 92 MMHG | SYSTOLIC BLOOD PRESSURE: 145 MMHG | RESPIRATION RATE: 20 BRPM | WEIGHT: 216.5 LBS | OXYGEN SATURATION: 98 % | HEART RATE: 98 BPM | TEMPERATURE: 98.1 F | BODY MASS INDEX: 30.99 KG/M2

## 2017-12-04 DIAGNOSIS — K70.31 ALCOHOLIC CIRRHOSIS OF LIVER WITH ASCITES (HCC): Primary | ICD-10-CM

## 2017-12-04 DIAGNOSIS — Z98.890 S/P LAPAROSCOPY: ICD-10-CM

## 2017-12-04 DIAGNOSIS — I10 ESSENTIAL HYPERTENSION: ICD-10-CM

## 2017-12-04 RX ORDER — AMLODIPINE BESYLATE 5 MG/1
5 TABLET ORAL DAILY
Qty: 90 TAB | Refills: 1 | Status: SHIPPED | OUTPATIENT
Start: 2017-12-04 | End: 2018-08-11 | Stop reason: SDUPTHER

## 2017-12-04 NOTE — PROGRESS NOTES
1. Have you been to the ER, urgent care clinic since your last visit? Hospitalized since your last visit? No     2. Have you seen or consulted any other health care providers outside of the [address] [place] [address] since your last visit? Include any pap smears or colon screening.  No

## 2017-12-04 NOTE — PROGRESS NOTES
Subjective:      Elida Hu is a 38 y.o. black male presents for postop care 3 weeks following a laparoscopy. Mr. Colleen Mendez is doing fine since his procedure. Objective:     Visit Vitals    BP (!) 145/92 (BP 1 Location: Left arm, BP Patient Position: Sitting)    Pulse 98    Temp 98.1 °F (36.7 °C) (Oral)    Resp 20    Ht 5' 9.5\" (1.765 m)    Wt 216 lb 8 oz (98.2 kg)    SpO2 98%    BMI 31.51 kg/m2       General:  alert, cooperative, no distress   Abdomen:  Soft, NT, ND. The incisions are clean, dry, and intact with no erythema. Assessment:     1st POV, diagnostic lap, core needle biopsy of the liver. Plan:     The pathology report was discussed with Mr. Colleen Mendez. I have referred him to Dr. Martha Lane for further care. He can f/u with me prn.

## 2018-02-07 DIAGNOSIS — K70.31 ALCOHOLIC CIRRHOSIS OF LIVER WITH ASCITES (HCC): Primary | ICD-10-CM

## 2018-03-02 ENCOUNTER — OFFICE VISIT (OUTPATIENT)
Dept: HEMATOLOGY | Age: 43
End: 2018-03-02

## 2018-03-02 VITALS
HEART RATE: 104 BPM | BODY MASS INDEX: 31.64 KG/M2 | WEIGHT: 213.6 LBS | HEIGHT: 69 IN | OXYGEN SATURATION: 98 % | DIASTOLIC BLOOD PRESSURE: 97 MMHG | SYSTOLIC BLOOD PRESSURE: 150 MMHG | TEMPERATURE: 99.1 F

## 2018-03-02 DIAGNOSIS — K74.60 CIRRHOSIS OF LIVER WITH ASCITES, UNSPECIFIED HEPATIC CIRRHOSIS TYPE (HCC): Primary | ICD-10-CM

## 2018-03-02 DIAGNOSIS — R18.8 CIRRHOSIS OF LIVER WITH ASCITES, UNSPECIFIED HEPATIC CIRRHOSIS TYPE (HCC): Primary | ICD-10-CM

## 2018-03-02 NOTE — PROGRESS NOTES
Chief Complaint   Patient presents with    New Patient     cirrhosis     Visit Vitals    BP (!) 150/97 (BP 1 Location: Left arm, BP Patient Position: Sitting)    Pulse (!) 104    Temp 99.1 °F (37.3 °C) (Tympanic)    Ht 5' 9\" (1.753 m)    Wt 213 lb 9.6 oz (96.9 kg)    SpO2 98%    BMI 31.54 kg/m2     Patient is aware of high bp. Currently working with PCP and medication mgmt.     PHQ over the last two weeks 3/2/2018   Little interest or pleasure in doing things Not at all   Feeling down, depressed or hopeless Not at all   Total Score PHQ 2 0

## 2018-03-02 NOTE — MR AVS SNAPSHOT
2700 [facility] Jovanni [id number] [address] [place] [address] 
[phone] Patient: Kristina Garza MRN: [id number] ERS:8/2/8391 Visit Information Date & Time Provider Department Dept. Phone Encounter #  
 3/2/2018  3:05 PM Jacob Dean. Prieto 47 of [place] [address] [id number] Follow-up Instructions Return in about 4 weeks (around 3/30/2018) for fibroscan fuad. Your Appointments 3/2/2018  3:05 PM  
ROUTINE CARE with MD Danna Dean 75 ([facility]) Appt Note: cirrhosis ref Dr Seay [facility]); NP, Nas/Lisa [address] [place] [address] Jovanni [id number] [facility] [address]  
59 [facility] J[place] [address] Upcoming Health Maintenance Date Due  
 LIPID PANEL Q1 1977 FOOT EXAM Q1 5/3/1987 EYE EXAM RETINAL OR DILATED Q1 5/3/1987 Pneumococcal 19-64 Medium Risk (1 of 1 - PPSV23) 5/3/1996 DTaP/Tdap/Td series (1 - Tdap) 5/3/1998 Influenza Age 5 to Adult 8/1/2017 HEMOGLOBIN A1C Q6M 4/27/2018 MICROALBUMIN Q1 10/27/2018 Allergies as of 3/2/2018  Review Complete On: 3/2/2018 By: Javier Lamb LPN Severity Noted Reaction Type Reactions Metformin  10/27/2017    Other (comments) Cannot tolerate pill size Current Immunizations  Reviewed on 10/27/2017 Name Date  
 TB Skin Test (PPD) 4/1/2017 Not reviewed this visit You Were Diagnosed With   
  
 Codes Comments Cirrhosis of liver with ascites, unspecified hepatic cirrhosis type ([facility] 75.)    -  Primary ICD-10-CM: K74.60 ICD-9-CM: 571.5 Vitals BP Pulse Temp Height(growth percentile) (!) 150/97 (BP 1 Location: Left arm, BP Patient Position: Sitting) (!) 104 99.1 °F (37.3 °C) (Tympanic) 5' 9\" (1.753 m) Weight(growth percentile) SpO2 BMI Smoking Status 213 lb 9.6 oz (96.9 kg) 98% 31.54 kg/m2 Light Tobacco Smoker Vitals History BMI and BSA Data Body Mass Index Body Surface Area 31.54 kg/m 2 2.17 m 2 Preferred Pharmacy Pharmacy Name Phone [id number], [address] [facility] [phone] Your Updated Medication List  
  
   
This list is accurate as of 3/2/18  2:52 PM.  Always use your most recent med list. amLODIPine 5 mg tablet Commonly known as:  Adrienne Slate Take 1 Tab by mouth daily. aspirin delayed-release 81 mg tablet Take 1 Tab by mouth daily. oxyCODONE-acetaminophen 5-325 mg per tablet Commonly known as:  PERCOCET Take 1 Tab by mouth every four (4) hours as needed for Pain. Max Daily Amount: 6 Tabs. We Performed the Following ACTIN (SMOOTH MUSCLE) ANTIBODY [id number] CPT(R)] AFP WITH AFP-L3% [[id number] Custom] ALPHA-1-ANTITRYPSIN, TOTAL, PHENOTYPE [56026 CPT(R)] CHRISTI, DIRECT, W/REFLEX [id number] CPT(R)] ANCA PANEL [id number] CPT(R)] CBC W/O DIFF [15129 CPT(R)] CERULOPLASMIN [id number] CPT(R)] FERRITIN [80877 CPT(R)] HCV ANTIBODY RFX TO QUANT PCR [85236 CPT(R)] HEP A AB, TOTAL [id number] CPT(R)] HEP B SURFACE AB [id number] CPT(R)] HEP B SURFACE AG [id number] CPT(R)] HEPATIC FUNCTION PANEL (6) [[id number] Custom] HEPATITIS B CORE AB, IGM [id number] CPT(R)] IRON PROFILE [id number] CPT(R)] METABOLIC PANEL, BASIC [29312 CPT(R)] MITOCHONDRIAL M2 AB [id number] CPT(R)] PROTHROMBIN TIME + INR [44190 CPT(R)] Follow-up Instructions Return in about 4 weeks (around 3/30/2018) for fibroscan fuad. To-Do List   
 03/16/2018 Imaging:  US ABD COMP   
  
 04/11/2018 1:00 PM  
  Appointment with Brandyn Bhatt. Milana Perez NP at [place] [address] ([phone]) Patient Instructions FIBROSCAN PATIENT INFORMATION What is Fibroscan:? 
 
Fibroscan is an ultrasound device that measures liver stiffness by sending a pulse of vibrations through the liver.   This translated into an immediate result that can help your healthcare team determine the level of damage to the liver as well as monitor the condition of various liver diseases over time. Fibroscan is helpful in the evaluation of the following conditions: 
 
Chronic Hepatitis C Chronic Hepatitis B Fatty Liver Disease Alcohol Liver Disease Chronic Cholestatic Liver Diseases What happens During the Scan? Patients receiving this exam lie flat on an examination table and raise the right arm above the head. The skin over the right lower rib cage is exposed and the examiner locates the correct area to be scanned. The prove of the scanner is placed directly on the patient and triggered to start. This fells like a gentle flick against the skin and should not be uncomfortable. At least ten (10) readings are taken and the average is calculated to score the amount of liver stiffness or scar tissue. The exam should take 10-20 minutes. What do I need to do to prepare for the scan? Please do not eat or drink anything 2-4 hours  Before your Fibroscan. You should continue taking any prescribed medication and can take small sips of water or clear fluid to do so,  But avoid drinking large amounts of fluid. Please dress comfortably in clothes that will allow for easy access to the right side of the abdomen. Women are discouraged from wearing a dress on the day of the exam. 
 
Are there any special precautions? Patients who are pregnant or have an implantable device (for example, pacemaker or defibrillator) should not have this exam performed. Patients with a significant amount of fat tissue in the area the probe is pressed may be unable to have test performed. Introducing [facility] & HEALTH SERVICES! Ghassan Ghotra introduces Yotta280 patient portal. Now you can access parts of your medical record, email your doctor's office, and request medication refills online.    
 
1. In your internet browser, go to [email or web address] 2. Click on the First Time User? Click Here link in the Sign In box. You will see the New Member Sign Up page. 3. Enter your EasyPost Access Code exactly as it appears below. You will not need to use this code after youve completed the sign-up process. If you do not sign up before the expiration date, you must request a new code. · EasyPost Access Code: [email or web address] Expires: 5/19/2018  9:29 PM 
 
4. Enter the last four digits of your Social Security Number (xxxx) and Date of Birth (mm/dd/yyyy) as indicated and click Submit. You will be taken to the next sign-up page. 5. Create a B[employer] ID. This will be your EasyPost login ID and cannot be changed, so think of one that is secure and easy to remember. 6. Create a EasyPost password. You can change your password at any time. 7. Enter your Password Reset Question and Answer. This can be used at a later time if you forget your password. 8. Enter your e-mail address. You will receive e-mail notification when new information is available in [address] 9. Click Sign Up. You can now view and download portions of your medical record. 10. Click the Download Summary menu link to download a portable copy of your medical information. If you have questions, please visit the Frequently Asked Questions section of the EasyPost website. Remember, EasyPost is NOT to be used for urgent needs. For medical emergencies, dial 911. Now available from your iPhone and Android! Please provide this summary of care documentation to your next provider. Your primary care clinician is listed as Michael Mauro. If you have any questions after today's visit, please call [phone].

## 2018-03-02 NOTE — PROGRESS NOTES
70 Mattie Irving MD, [place], [facility] [place]       Zulema Mendoza, DILAN Esteban, MAXIMUS Flores, ACNP-ARLIN Longo, DILAN [facility] 136    at [address], [address] [facility], [address].    [phone]    FAX: [address] [place] [address], [facility], [address]    [phone]    FAX: [phone]       Patient Care Team:  Tana Rios MD as PCP - General (Internal Medicine)  Roro Myles MD (Surgery)      Problem List  Date Reviewed: 12/4/2017          Codes Class Noted    S/P laparoscopy ICD-10-CM: Z83.636  ICD-9-CM: V45.89  12/4/2017    Overview Signed 12/4/2017 10:19 AM by Roro Myles MD     Diagnostic, core needle biopsy of the liver. Cirrhosis ([facility] 75.) ICD-10-CM: K74.60  ICD-9-CM: 571.5  11/14/2017        Ascites ICD-10-CM: R18.8  ICD-9-CM: 789.59  11/14/2017        Type 2 diabetes mellitus with hemoglobin A1c goal of less than 7.0% (HCC) ICD-10-CM: E11.9  ICD-9-CM: 250.00  11/10/2017        Obesity (BMI 30.0-34.9) ICD-10-CM: E66.9  ICD-9-CM: 278.00  11/6/2017        HTN (hypertension) ICD-10-CM: I10  ICD-9-CM: 401.9  11/6/2017        Type II diabetes mellitus ([facility] ICD-10-CM: E11.9  ICD-9-CM: 250.00  11/6/2017        Incarcerated umbilical hernia KYP-36-UU: K42.0  ICD-9-CM: 552.1  11/6/2017    Overview Signed 11/6/2017 10:19 AM by Roro Myles MD     Without gangrene or obstruction.              Psoriasis (Chronic) ICD-10-CM: L40.9  ICD-9-CM: 696.1  10/27/2017    Overview Signed 10/27/2017  2:33 PM by MD Dr. Damaris Freitas                 The physicians listed above have asked me to see Talha Palacios in consultation regarding management of cirrhosis of undefined cause. All medical records sent by the referring physicians were reviewed including imaging studies and pathology. The patient is a 38 y.o. Black male who was first found to have chronic liver disease and cirrhosis in 11/20017 during a lap umbilical hernia repair, which was aborted after discovering the cirrhosis. A liver biopsy was performed in 11/2017. This demonstrated cirrhosis. The patient has not developed any major complications of cirrhosis to date. Ascites first appeared in 11/2017. It was minimal and found during surgery. The patient has not developed edema. The patient has not developed hepatic encephalopathy. The patient has not had been evaluated for varices. The most recent laboratory studies indicate the platelet count is normal.    The patient has no symptoms which could be attributed to the liver disorder. The patient completes all daily activities without any functional limitations. The patient has not experienced pain in the right side over the liver, yellowing of the eyes or skin, swelling of the abdomen or swelling of the lower extremities. ALLERGIES  Allergies   Allergen Reactions    Metformin Other (comments)     Cannot tolerate pill size       MEDICATIONS  Current Outpatient Prescriptions   Medication Sig    amLODIPine (NORVASC) 5 mg tablet Take 1 Tab by mouth daily.  aspirin delayed-release 81 mg tablet Take 1 Tab by mouth daily.  oxyCODONE-acetaminophen (PERCOCET) 5-325 mg per tablet Take 1 Tab by mouth every four (4) hours as needed for Pain. Max Daily Amount: 6 Tabs. No current facility-administered medications for this visit. SYSTEM REVIEW NOT RELATED TO LIVER DISEASE OR REVIEWED ABOVE:  Constitution systems: Negative for fever, chills, weight gain, weight loss. Eyes: Negative for visual changes. ENT: Negative for sore throat, painful swallowing.    Respiratory: Negative for cough, hemoptysis, SOB. Cardiology: Negative for chest pain, palpitations. GI:  Negative for constipation or diarrhea. : Negative for urinary frequency, dysuria, hematuria, nocturia. Skin: Negative for rash. Hematology: Negative for easy bruising, blood clots. Musculo-skeletal: Negative for back pain, muscle pain, weakness. Neurologic: Negative for headaches, dizziness, vertigo, memory problems not related to HE. Psychology: Negative for anxiety, depression. FAMILY HISTORY:  The father  of a heart attack. The mother  of cancer. There is no family history of liver disease. SOCIAL HISTORY:  The patient has never been . The patient has no children. The patient smokes cigars on the weekends. The patient drinks about 2 beers a night. The patient currently works full time at [employer] as a . PHYSICAL EXAMINATION:  Visit Vitals    BP (!) 150/97 (BP 1 Location: Left arm, BP Patient Position: Sitting)    Pulse (!) 104    Temp 99.1 °F (37.3 °C) (Tympanic)    Ht 5' 9\" (1.753 m)    Wt 213 lb 9.6 oz (96.9 kg)    SpO2 98%    BMI 31.54 kg/m2     General: No acute distress. Eyes: Sclera anicteric. ENT: No oral lesions. Nodes: No adenopathy. Skin: No spider angiomata. No jaundice. No palmar erythema. Respiratory: Lungs clear to auscultation. Cardiovascular: Regular heart rate. No murmurs. No JVD. Abdomen: Soft non-tender. Liver size normal to percussion/palpation. Spleen not palpable. No obvious ascites. Extremities: No edema. No muscle wasting. No gross arthritic changes. Neurologic: Alert and oriented. Cranial nerves grossly intact. No asterixis.     LABORATORY STUDIES:  Liver [place] of [address] Units 3/2/2018   WBC 3.4 - 10.8 x10E3/uL 9.9   ANC 1.8 - 8.0 K/UL    HGB 13.0 - 17.7 g/dL 16.6    - 379 x10E3/uL 247   INR  CANCELED   AST 0 - 40 IU/L 40   ALT 0 - 44 IU/L 28   Alk Phos 39 - 117 IU/L 117   Bili, Total 0.0 - 1.2 mg/dL 1.4 (H)   Bili, Direct 0.00 - 0.40 mg/dL 0.46 (H)   Albumin 3.5 - 5.5 g/dL 4.5   BUN 6 - 24 mg/dL 6   Creat 0.76 - 1.27 mg/dL 0.64 (L)   Na 134 - 144 mmol/L 140   K 3.5 - 5.2 mmol/L 4.3   Cl 96 - 106 mmol/L 95 (L)   CO2 18 - 29 mmol/L 28   Glucose 65 - 99 mg/dL 116 (H)     SEROLOGIES:  Not available or performed. Testing will be performed as indicated. LIVER HISTOLOGY:  Not available or performed    ENDOSCOPIC PROCEDURES:  Not available or performed    RADIOLOGY:  Not available or performed    OTHER TESTING:  Not available or performed    ASSESSMENT AND PLAN:  Cirrhosis of undefined cause. The platelet count is normal.      Will perform laboratory testing to monitor liver function and degree of liver injury. Ascites will be assessed via ultrasound. A paracentesis has been ordered (with cell count) in case there is fluid to tap. Esophageal varices have not been previously assessed for with upper endoscopy. Will schedule for EGD to assess for varices and need for banding. Hepatic encephalopathy has not developed to date. There is no need for treatment with lactulose and/or Xifaxan at this time. The patient was directed to continue all current medications at the current dosages. There are no contraindications for the patient to take any medications that are necessary for treatment of other medical issues. The patient was counseled regarding alcohol consumption. The risk of osteoporosis is increased in patients with cirrhosis. DEXA bone density to assess for osteoporosis has not been performed. This should be ordered by the patients primary care physician. The need for vaccination against viral hepatitis A and B will be assessed with serologic and instituted as appropriate. [facility] 75. screening will be performed. AFP was ordered today and ultrasound will be scheduled. All of the above issues were discussed with the patient. All questions were answered.   The patient expressed a clear understanding of the above. [address] [facility] 87 in 4 weeks for FibroScan, to review all data and determine the treatment plan.     Farshad Alcaraz MD  Liver [place] of [address] [place] [address] 2718 [facility] [address] [facility], [address] [address]  22.  [phone]

## 2018-03-03 LAB
ALBUMIN SERPL-MCNC: 4.5 G/DL (ref 3.5–5.5)
ALP SERPL-CCNC: 117 IU/L (ref 39–117)
ALT SERPL-CCNC: 28 IU/L (ref 0–44)
ANA SER QL: NEGATIVE
AST SERPL-CCNC: 40 IU/L (ref 0–40)
BILIRUB DIRECT SERPL-MCNC: 0.46 MG/DL (ref 0–0.4)
BILIRUB SERPL-MCNC: 1.4 MG/DL (ref 0–1.2)
BUN SERPL-MCNC: 6 MG/DL (ref 6–24)
BUN/CREAT SERPL: 9 (ref 9–20)
CALCIUM SERPL-MCNC: 9.6 MG/DL (ref 8.7–10.2)
CERULOPLASMIN SERPL-MCNC: 33.6 MG/DL (ref 16–31)
CHLORIDE SERPL-SCNC: 95 MMOL/L (ref 96–106)
CO2 SERPL-SCNC: 28 MMOL/L (ref 18–29)
CREAT SERPL-MCNC: 0.64 MG/DL (ref 0.76–1.27)
ERYTHROCYTE [DISTWIDTH] IN BLOOD BY AUTOMATED COUNT: 14 % (ref 12.3–15.4)
FERRITIN SERPL-MCNC: 267 NG/ML (ref 30–400)
GFR SERPLBLD CREATININE-BSD FMLA CKD-EPI: 122 ML/MIN/1.73
GFR SERPLBLD CREATININE-BSD FMLA CKD-EPI: 142 ML/MIN/1.73
GLUCOSE SERPL-MCNC: 116 MG/DL (ref 65–99)
HAV AB SER QL IA: NEGATIVE
HBV CORE IGM SERPL QL IA: NEGATIVE
HBV SURFACE AB SER QL: NON REACTIVE
HBV SURFACE AG SERPL QL IA: NEGATIVE
HCT VFR BLD AUTO: 47 % (ref 37.5–51)
HCV AB S/CO SERPL IA: <0.1 S/CO RATIO (ref 0–0.9)
HCV AB SERPL QL IA: NORMAL
HGB BLD-MCNC: 16.6 G/DL (ref 13–17.7)
INR PPP: NORMAL
IRON SATN MFR SERPL: 36 % (ref 15–55)
IRON SERPL-MCNC: 147 UG/DL (ref 38–169)
MCH RBC QN AUTO: 32.8 PG (ref 26.6–33)
MCHC RBC AUTO-ENTMCNC: 35.3 G/DL (ref 31.5–35.7)
MCV RBC AUTO: 93 FL (ref 79–97)
PLATELET # BLD AUTO: 247 X10E3/UL (ref 150–379)
POTASSIUM SERPL-SCNC: 4.3 MMOL/L (ref 3.5–5.2)
PROTHROMBIN TIME: NORMAL S
RBC # BLD AUTO: 5.06 X10E6/UL (ref 4.14–5.8)
SODIUM SERPL-SCNC: 140 MMOL/L (ref 134–144)
TIBC SERPL-MCNC: 414 UG/DL (ref 250–450)
UIBC SERPL-MCNC: 267 UG/DL (ref 111–343)
WBC # BLD AUTO: 9.9 X10E3/UL (ref 3.4–10.8)

## 2018-03-05 LAB
ACTIN IGG SERPL-ACNC: 27 UNITS (ref 0–19)
AFP L3 MFR SERPL: 5.2 % (ref 0–9.9)
AFP SERPL-MCNC: 7.3 NG/ML (ref 0–8)
MITOCHONDRIA M2 IGG SER-ACNC: 12.3 UNITS (ref 0–20)

## 2018-03-06 LAB
A1AT PHENOTYP SERPL IFE: NORMAL
A1AT SERPL-MCNC: 173 MG/DL (ref 90–200)

## 2018-03-07 LAB
C-ANCA TITR SER IF: NORMAL TITER
MYELOPEROXIDASE AB SER IA-ACNC: <9 U/ML (ref 0–9)
P-ANCA ATYPICAL TITR SER IF: NORMAL TITER
P-ANCA TITR SER IF: NORMAL TITER
PROTEINASE3 AB SER IA-ACNC: <3.5 U/ML (ref 0–3.5)

## 2018-03-16 DIAGNOSIS — R18.8 CIRRHOSIS OF LIVER WITH ASCITES, UNSPECIFIED HEPATIC CIRRHOSIS TYPE (HCC): ICD-10-CM

## 2018-03-16 DIAGNOSIS — K74.60 CIRRHOSIS OF LIVER WITH ASCITES, UNSPECIFIED HEPATIC CIRRHOSIS TYPE (HCC): ICD-10-CM

## 2018-03-19 ENCOUNTER — HOSPITAL ENCOUNTER (OUTPATIENT)
Dept: ULTRASOUND IMAGING | Age: 43
Discharge: HOME | End: 2018-03-19
Attending: NURSE PRACTITIONER
Payer: COMMERCIAL

## 2018-03-19 DIAGNOSIS — K74.60 CIRRHOSIS OF LIVER WITH ASCITES, UNSPECIFIED HEPATIC CIRRHOSIS TYPE (HCC): ICD-10-CM

## 2018-03-19 DIAGNOSIS — R18.8 CIRRHOSIS OF LIVER WITH ASCITES, UNSPECIFIED HEPATIC CIRRHOSIS TYPE (HCC): ICD-10-CM

## 2018-03-19 PROCEDURE — 76700 US EXAM ABDOM COMPLETE: CPT

## 2018-03-20 LAB
INR PPP: 1.1 (ref 0.8–1.2)
PROTHROMBIN TIME: 11.4 SEC (ref 9.1–12)

## 2018-03-26 ENCOUNTER — TELEPHONE (OUTPATIENT)
Dept: HEMATOLOGY | Age: 43
End: 2018-03-26

## 2018-03-26 NOTE — TELEPHONE ENCOUNTER
Spoke with patient regarding his paracentesis order that was put in back in 3/2/18. Gave patient the phone number to coordination of care.   He stated he would call and schedule the appointment today when he gets off work at 2:30pm.

## 2018-03-30 NOTE — PROGRESS NOTES
Actin elevated but liver enzymes are normal. Will discuss with pt at office visit and see how fibrosis looks.

## 2018-04-03 ENCOUNTER — HOSPITAL ENCOUNTER (OUTPATIENT)
Dept: ULTRASOUND IMAGING | Age: 43
Discharge: HOME | End: 2018-04-03
Attending: NURSE PRACTITIONER
Payer: COMMERCIAL

## 2018-04-03 DIAGNOSIS — R18.8 CIRRHOSIS OF LIVER WITH ASCITES, UNSPECIFIED HEPATIC CIRRHOSIS TYPE (HCC): ICD-10-CM

## 2018-04-03 DIAGNOSIS — K74.60 CIRRHOSIS OF LIVER WITH ASCITES, UNSPECIFIED HEPATIC CIRRHOSIS TYPE (HCC): ICD-10-CM

## 2018-04-03 PROCEDURE — 76705 ECHO EXAM OF ABDOMEN: CPT

## 2018-04-11 ENCOUNTER — OFFICE VISIT (OUTPATIENT)
Dept: HEMATOLOGY | Age: 43
End: 2018-04-11

## 2018-04-11 VITALS
HEART RATE: 93 BPM | SYSTOLIC BLOOD PRESSURE: 163 MMHG | TEMPERATURE: 98.4 F | WEIGHT: 217.2 LBS | HEIGHT: 69 IN | BODY MASS INDEX: 32.17 KG/M2 | DIASTOLIC BLOOD PRESSURE: 104 MMHG | OXYGEN SATURATION: 98 %

## 2018-04-11 DIAGNOSIS — R18.8 CIRRHOSIS OF LIVER WITH ASCITES, UNSPECIFIED HEPATIC CIRRHOSIS TYPE (HCC): ICD-10-CM

## 2018-04-11 DIAGNOSIS — R74.8 ELEVATED LIVER ENZYMES: Primary | ICD-10-CM

## 2018-04-11 DIAGNOSIS — K74.60 CIRRHOSIS OF LIVER WITH ASCITES, UNSPECIFIED HEPATIC CIRRHOSIS TYPE (HCC): ICD-10-CM

## 2018-04-11 NOTE — MR AVS SNAPSHOT
1111 [facility] [id number] [address] [place] [address] 
[phone] Patient: Yulissa Paul MRN: [id number] TBB:3/[birth year]/3824 Visit Information Date & Time Provider Department Dept. Phone Encounter #  
 4/11/2018  1:00 PM Oliva Langford, [address] [place] [address] Dr garcía [facility] 219 [id number] Your Appointments 6/8/2018  7:30 AM  
PROCEDURE with MD Danna Rodriguez 75 [address] [place] [address]) Appt Note: EGD; EGD per Othella Dye 200 [facility] Jovanni [id number] [address] [place] [address]  
[phone]  
  
   
 [address] [facility] [address]  
  
    
 6/8/2018  7:45 AM  
PROCEDURE with MD Danna Rodriguez 75 [address] [place] [address]) Appt Note: LBX per Othella Dye 200 [facility] [id number] [address] [place] [address]  
[phone]  
  
    
 6/22/2018  3:30 PM  
Follow Up with MD Danna Rodriguez 75 ([address] [place] [address]) Appt Note: LBX/EGD f/u from 6.8.18 200 [facility] Jovanni [id number] [facility] [address]  
59 [facility] [address] Upcoming Health Maintenance Date Due  
 LIPID PANEL Q1 1977 FOOT EXAM Q1 5/3/1987 EYE EXAM RETINAL OR DILATED Q1 5/3/1987 Pneumococcal 19-64 Medium Risk (1 of 1 - PPSV23) 5/3/1996 DTaP/Tdap/Td series (1 - Tdap) 5/3/1998 Influenza Age 5 to Adult 8/1/2017 HEMOGLOBIN A1C Q6M 4/27/2018 MICROALBUMIN Q1 10/27/2018 Allergies as of 4/11/2018  Review Complete On: 3/4/2018 By: Governor Winter MD  
  
 Severity Noted Reaction Type Reactions Metformin  10/27/2017    Other (comments) Cannot tolerate pill size Current Immunizations  Reviewed on 10/27/2017 Name Date  
 TB Skin Test (PPD) 4/1/2017 Not reviewed this visit You Were Diagnosed With   
  
 Codes Comments Elevated liver enzymes    -  Primary ICD-10-CM: R74.8 ICD-9-CM: 790.5 Cirrhosis of liver with ascites, unspecified hepatic cirrhosis type ([facility] 75.)     ICD-10-CM: K74.60 ICD-9-CM: 571.5 Vitals BP Pulse Temp Height(growth percentile) (!) 163/104 (BP 1 Location: Right arm, BP Patient Position: Sitting) 93 98.4 °F (36.9 °C) (Tympanic) 5' 9\" (1.753 m) Weight(growth percentile) SpO2 BMI Smoking Status 217 lb 3.2 oz (98.5 kg) 98% 32.07 kg/m2 Light Tobacco Smoker BMI and BSA Data Body Mass Index Body Surface Area 32.07 kg/m 2 2.19 m 2 Preferred Pharmacy Pharmacy Name Phone [id number] [address] [facility] [phone] Your Updated Medication List  
  
   
This list is accurate as of 4/11/18  1:29 PM.  Always use your most recent med list. amLODIPine 5 mg tablet Commonly known as:  Mohan Inks Take 1 Tab by mouth daily. aspirin delayed-release 81 mg tablet Take 1 Tab by mouth daily. oxyCODONE-acetaminophen 5-325 mg per tablet Commonly known as:  PERCOCET Take 1 Tab by mouth every four (4) hours as needed for Pain. Max Daily Amount: 6 Tabs. We Performed the Following CBC W/O DIFF [34682 CPT(R)] HEPATIC FUNCTION PANEL (6) [[id number] Custom] LIVER ELASTOGRAPHY W/O IMAG W/I&R [96117 CPT(R)] METABOLIC PANEL, BASIC [33795 CPT(R)] To-Do List   
 04/25/2018 Imaging:  US ABD COMP [facility] & HEALTH SERVICES! [facility] introduces Alianza patient portal. Now you can access parts of your medical record, email your doctor's office, and request medication refills online. 1. In your internet browser, go to [email or web address] 2. Click on the First Time User? Click Here link in the Sign In box. You will see the New Member Sign Up page. 3. Enter your Alianza Access Code exactly as it appears below. You will not need to use this code after youve completed the sign-up process.  If you do not sign up before the expiration date, you must request a new code. · [employer] Access Code: [email or web address] Expires: 5/19/2018 10:29 PM 
 
4. Enter the last four digits of your Social Security Number (xxxx) and Date of Birth (mm/dd/yyyy) as indicated and click Submit. You will be taken to the next sign-up page. 5. Create a [employer] ID. This will be your [employer] login ID and cannot be changed, so think of one that is secure and easy to remember. 6. Create a [employer] password. You can change your password at any time. 7. Enter your Password Reset Question and Answer. This can be used at a later time if you forget your password. 8. Enter your e-mail address. You will receive e-mail notification when new information is available in [address] 9. Click Sign Up. You can now view and download portions of your medical record. 10. Click the Download Summary menu link to download a portable copy of your medical information. If you have questions, please visit the Frequently Asked Questions section of the [employer] website. Remember, [employer] is NOT to be used for urgent needs. For medical emergencies, dial 911. Now available from your iPhone and Android! Please provide this summary of care documentation to your next provider. Your primary care clinician is listed as Victor M Ardon. If you have any questions after today's visit, please call [phone].

## 2018-04-11 NOTE — PROGRESS NOTES
70 Mattie Irving MD, [address], Cite Mongi Slim, 58[address] [facility]       Ankur Hernandez, DILAN Suarez, MAXIMUS Leo, [facility]   Marilou Guillen, DILAN Monterroso [facility] [place] 136    at [address], [address] Gala Daniels  22.    [phone]    FAX: [address] [place] [address] Drive, [address], [address]    [phone]    FAX: [phone]     Patient Care Team:  Gretel Ingram MD as PCP - General (Internal Medicine)  Edie Dalal MD (Surgery)    Problem List  Date Reviewed: 3/4/2018          Codes Class Noted    S/P laparoscopy ICD-10-CM: H69.244  ICD-9-CM: V45.89  12/4/2017    Overview Signed 12/4/2017 10:19 AM by Edie Dalal MD     Diagnostic, core needle biopsy of the liver. Cirrhosis ([facility] 75.) ICD-10-CM: K74.60  ICD-9-CM: 571.5  11/14/2017        Ascites ICD-10-CM: R18.8  ICD-9-CM: 789.59  11/14/2017        Type 2 diabetes mellitus with hemoglobin A1c goal of less than 7.0% (HCC) ICD-10-CM: E11.9  ICD-9-CM: 250.00  11/10/2017        Obesity (BMI 30.0-34.9) ICD-10-CM: E66.9  ICD-9-CM: 278.00  11/6/2017        HTN (hypertension) ICD-10-CM: I10  ICD-9-CM: 401.9  11/6/2017        Type II diabetes mellitus ([facility] ICD-10-CM: E11.9  ICD-9-CM: 250.00  11/6/2017        Incarcerated umbilical hernia [id number]: K42.0  ICD-9-CM: 552.1  11/6/2017    Overview Signed 11/6/2017 10:19 AM by Edie Dalal MD     Without gangrene or obstruction.              Psoriasis (Chronic) ICD-10-CM: L40.9  ICD-9-CM: 696.1  10/27/2017    Overview Signed 10/27/2017  2:33 PM by MD Dr. 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 The active problem list, all pertinent past medical history, medications, liver histology, radiologic findings   and laboratory findings related to the liver disorder were reviewed with the patient. The patient is a 38 y.o. Black male who was first found to have chronic liver disease and cirrhosis in 11/20017 during a lap umbilical hernia repair, which was aborted after discovering the cirrhosis. A liver biopsy was performed in 11/2017. This demonstrated cirrhosis. The patient has not developed any major complications of cirrhosis to date. Ascites first appeared in 11/2017. It was minimal and found during surgery. The patient has not developed edema. The patient has not developed hepatic encephalopathy. The patient has not had been evaluated for varices. The most recent laboratory studies indicate the ALT is normal, AST is normal, ALP is normal, TBILI is elevated, albumin is normal and the platelet count is normal.    The patient has no symptoms which could be attributed to the liver disorder. His main concern is still his umbilical hernia, which he desperately wants repaired. The patient completes all daily activities without any functional limitations. The patient has not experienced pain in the right side over the liver, yellowing of the eyes or skin, swelling of the abdomen or swelling of the lower extremities. ALLERGIES  Allergies   Allergen Reactions    Metformin Other (comments)     Cannot tolerate pill size     MEDICATIONS  Current Outpatient Prescriptions   Medication Sig    amLODIPine (NORVASC) 5 mg tablet Take 1 Tab by mouth daily.  aspirin delayed-release 81 mg tablet Take 1 Tab by mouth daily.  oxyCODONE-acetaminophen (PERCOCET) 5-325 mg per tablet Take 1 Tab by mouth every four (4) hours as needed for Pain. Max Daily Amount: 6 Tabs. No current facility-administered medications for this visit.       SYSTEM REVIEW NOT RELATED TO LIVER DISEASE OR REVIEWED ABOVE:  Constitution systems: Negative for fever, chills, weight gain, weight loss. Eyes: Negative for visual changes. ENT: Negative for sore throat, painful swallowing. Respiratory: Negative for cough, hemoptysis, SOB. Cardiology: Negative for chest pain, palpitations. GI:  Negative for constipation or diarrhea. : Negative for urinary frequency, dysuria, hematuria, nocturia. Skin: Negative for rash. Hematology: Negative for easy bruising, blood clots. Musculo-skeletal: Negative for back pain, muscle pain, weakness. Neurologic: Negative for headaches, dizziness, vertigo, memory problems not related to HE. Psychology: Negative for anxiety, depression. FAMILY HISTORY:  The father  of a heart attack. The mother  of cancer. There is no family history of liver disease. SOCIAL HISTORY:  The patient has never been . The patient has no children. The patient smokes cigars on the weekends. The patient drinks about 2 beers a night. The patient currently works full time at [employer] as a . PHYSICAL EXAMINATION:  Visit Vitals    BP (!) 163/104 (BP 1 Location: Right arm, BP Patient Position: Sitting)    Pulse 93    Temp 98.4 °F (36.9 °C) (Tympanic)    Ht 5' 9\" (1.753 m)    Wt 217 lb 3.2 oz (98.5 kg)    SpO2 98%    BMI 32.07 kg/m2     General: No acute distress. Eyes: Sclera anicteric. ENT: No oral lesions. Nodes: No adenopathy. Skin: No spider angiomata. No jaundice. No palmar erythema. Respiratory: Lungs clear to auscultation. Cardiovascular: Regular heart rate. No murmurs. No JVD. Abdomen: Soft non-tender. Liver size normal to percussion/palpation. Spleen not palpable. No obvious ascites. Extremities: No edema. No muscle wasting. No gross arthritic changes. Neurologic: Alert and oriented. Cranial nerves grossly intact. No asterixis.     LABORATORY STUDIES:  Liver [place] of [address] [place] [address] Ref Rng & Units 3/2/2018   WBC 3.4 - 10.8 x10E3/uL 9.9   ANC 1.8 - 8.0 K/UL    HGB 13.0 - 17.7 g/dL 16.6    - 379 x10E3/uL 247   INR  CANCELED   AST 0 - 40 IU/L 40   ALT 0 - 44 IU/L 28   Alk Phos 39 - 117 IU/L 117   Bili, Total 0.0 - 1.2 mg/dL 1.4 (H)   Bili, Direct 0.00 - 0.40 mg/dL 0.46 (H)   Albumin 3.5 - 5.5 g/dL 4.5   BUN 6 - 24 mg/dL 6   Creat 0.76 - 1.27 mg/dL 0.64 (L)   Na 134 - 144 mmol/L 140   K 3.5 - 5.2 mmol/L 4.3   Cl 96 - 106 mmol/L 95 (L)   CO2 18 - 29 mmol/L 28   Glucose 65 - 99 mg/dL 116 (H)     SEROLOGIES:  Serologies Latest Ref Rng & Units 3/2/2018   Hep A Ab, Total Negative Negative   Hep B Surface Ag Negative Negative   Hep B Surface AB QL  Non Reactive   Hep C Ab 0.0 - 0.9 s/co ratio <0.1   Ferritin 30 - 400 ng/mL 267   Iron % Saturation 15 - 55 % 36   CHRISTI Ab, Direct Negative Negative   C-ANCA Neg:<1:20 titer <1:20   P-ANCA Neg:<1:20 titer <1:20   ANCA Neg:<1:20 titer <1:20   ASMCA 0 - 19 Units 27 (H)   M2 Ab 0.0 - 20.0 Units 12.3   Ceruloplasmin 16.0 - 31.0 mg/dL 33.6 (H)   Alpha-1 antitrypsin level 90 - 200 mg/dL 173     LIVER HISTOLOGY:  4/2018. FibroScan performed at [facility]. EkPa was 48.0. IQR/med 20%. The results suggested a fibrosis level of F4. CAP is 282, consistent with fatty liver disease. ENDOSCOPIC PROCEDURES:  Not available or performed    RADIOLOGY:  3/2018. Abdominal ultrasound. Liver is normal. No lesions or masses. OTHER TESTING:  Not available or performed    ASSESSMENT AND PLAN:  Cirrhosis likely due to fatty liver. A biopsy will be scheduled. The platelet count is normal.  Actin was elevated. Will perform laboratory testing to monitor liver function and degree of liver injury. Esophageal varices have not been previously assessed for with upper endoscopy. EGD is scheduled. Hepatic encephalopathy has not developed to date. There is no need for treatment with lactulose and/or Xifaxan at this time.     The patient was advised about possible participation in clinical trials. Revisit at next office visit. The patient was directed to continue all current medications at the current dosages. There are no contraindications for the patient to take any medications that are necessary for treatment of other medical issues. The patient was counseled regarding alcohol consumption. The risk of osteoporosis is increased in patients with cirrhosis. DEXA bone density to assess for osteoporosis has not been performed. This should be ordered by the patients primary care physician. Vaccination for viral hepatitis A and B is recommended since the patient has no serologic evidence of previous exposure or vaccination with immunity. [facility] [place] [address]. screening will be performed. AFP is normal and ultrasound was performed. Next imaging due 9/2018. All of the above issues were discussed with the patient. All questions were answered. The patient expressed a clear understanding of the above. [address] in 2 weeks after liver biopsy. Repeating due to elevated ASMCA. Cannot find read on intra-op biopsy.      Aleah Resendiz [facility]-BC  Liver [place] [facility] [address] [facility] [address] [place], [address] Gala Daniels  22.  [phone]

## 2018-04-12 LAB
ALBUMIN SERPL-MCNC: 4.7 G/DL (ref 3.5–5.5)
ALP SERPL-CCNC: 116 IU/L (ref 39–117)
ALT SERPL-CCNC: 49 IU/L (ref 0–44)
AST SERPL-CCNC: 76 IU/L (ref 0–40)
BILIRUB DIRECT SERPL-MCNC: 0.36 MG/DL (ref 0–0.4)
BILIRUB SERPL-MCNC: 1 MG/DL (ref 0–1.2)
BUN SERPL-MCNC: 9 MG/DL (ref 6–24)
BUN/CREAT SERPL: 13 (ref 9–20)
CALCIUM SERPL-MCNC: 9.9 MG/DL (ref 8.7–10.2)
CHLORIDE SERPL-SCNC: 98 MMOL/L (ref 96–106)
CO2 SERPL-SCNC: 26 MMOL/L (ref 18–29)
CREAT SERPL-MCNC: 0.71 MG/DL (ref 0.76–1.27)
ERYTHROCYTE [DISTWIDTH] IN BLOOD BY AUTOMATED COUNT: 13.1 % (ref 12.3–15.4)
GFR SERPLBLD CREATININE-BSD FMLA CKD-EPI: 117 ML/MIN/1.73
GFR SERPLBLD CREATININE-BSD FMLA CKD-EPI: 136 ML/MIN/1.73
GLUCOSE SERPL-MCNC: 102 MG/DL (ref 65–99)
HCT VFR BLD AUTO: 47.7 % (ref 37.5–51)
HGB BLD-MCNC: 16.7 G/DL (ref 13–17.7)
MCH RBC QN AUTO: 34 PG (ref 26.6–33)
MCHC RBC AUTO-ENTMCNC: 35 G/DL (ref 31.5–35.7)
MCV RBC AUTO: 97 FL (ref 79–97)
PLATELET # BLD AUTO: 246 X10E3/UL (ref 150–379)
POTASSIUM SERPL-SCNC: 4.3 MMOL/L (ref 3.5–5.2)
RBC # BLD AUTO: 4.91 X10E6/UL (ref 4.14–5.8)
SODIUM SERPL-SCNC: 140 MMOL/L (ref 134–144)
WBC # BLD AUTO: 10 X10E3/UL (ref 3.4–10.8)

## 2018-06-08 ENCOUNTER — ANESTHESIA (OUTPATIENT)
Dept: ENDOSCOPY | Age: 43
End: 2018-06-08
Payer: COMMERCIAL

## 2018-06-08 ENCOUNTER — APPOINTMENT (OUTPATIENT)
Dept: ULTRASOUND IMAGING | Age: 43
End: 2018-06-08
Attending: INTERNAL MEDICINE
Payer: COMMERCIAL

## 2018-06-08 ENCOUNTER — HOSPITAL ENCOUNTER (OUTPATIENT)
Age: 43
Setting detail: OUTPATIENT SURGERY
Discharge: HOME | End: 2018-06-08
Attending: INTERNAL MEDICINE | Admitting: INTERNAL MEDICINE
Payer: COMMERCIAL

## 2018-06-08 ENCOUNTER — ANESTHESIA EVENT (OUTPATIENT)
Dept: ENDOSCOPY | Age: 43
End: 2018-06-08
Payer: COMMERCIAL

## 2018-06-08 VITALS
SYSTOLIC BLOOD PRESSURE: 140 MMHG | RESPIRATION RATE: 22 BRPM | HEART RATE: 84 BPM | TEMPERATURE: 98 F | OXYGEN SATURATION: 96 % | DIASTOLIC BLOOD PRESSURE: 76 MMHG

## 2018-06-08 DIAGNOSIS — K74.60 CIRRHOSIS (HCC): ICD-10-CM

## 2018-06-08 PROCEDURE — 74011250636 HC RX REV CODE- 250/636

## 2018-06-08 PROCEDURE — 74011000250 HC RX REV CODE- 250

## 2018-06-08 PROCEDURE — 76060000032 HC ANESTHESIA 0.5 TO 1 HR: Performed by: INTERNAL MEDICINE

## 2018-06-08 PROCEDURE — 76040000007: Performed by: INTERNAL MEDICINE

## 2018-06-08 PROCEDURE — 77030014243 HC BND LIG VRCES BSC -D: Performed by: INTERNAL MEDICINE

## 2018-06-08 RX ORDER — SODIUM CHLORIDE 9 MG/ML
INJECTION, SOLUTION INTRAVENOUS
Status: DISCONTINUED | OUTPATIENT
Start: 2018-06-08 | End: 2018-06-08 | Stop reason: HOSPADM

## 2018-06-08 RX ORDER — MIDAZOLAM HYDROCHLORIDE 1 MG/ML
5-10 INJECTION, SOLUTION INTRAMUSCULAR; INTRAVENOUS
Status: ACTIVE | OUTPATIENT
Start: 2018-06-08 | End: 2018-06-08

## 2018-06-08 RX ORDER — ATROPINE SULFATE 0.1 MG/ML
0.5 INJECTION INTRAVENOUS
Status: ACTIVE | OUTPATIENT
Start: 2018-06-08 | End: 2018-06-09

## 2018-06-08 RX ORDER — SODIUM CHLORIDE 0.9 % (FLUSH) 0.9 %
5-10 SYRINGE (ML) INJECTION EVERY 8 HOURS
Status: ACTIVE | OUTPATIENT
Start: 2018-06-08 | End: 2018-06-08

## 2018-06-08 RX ORDER — NALOXONE HYDROCHLORIDE 0.4 MG/ML
0.4 INJECTION, SOLUTION INTRAMUSCULAR; INTRAVENOUS; SUBCUTANEOUS
Status: ACTIVE | OUTPATIENT
Start: 2018-06-08 | End: 2018-06-08

## 2018-06-08 RX ORDER — LIDOCAINE HYDROCHLORIDE 20 MG/ML
INJECTION, SOLUTION EPIDURAL; INFILTRATION; INTRACAUDAL; PERINEURAL AS NEEDED
Status: DISCONTINUED | OUTPATIENT
Start: 2018-06-08 | End: 2018-06-08 | Stop reason: HOSPADM

## 2018-06-08 RX ORDER — FENTANYL CITRATE 50 UG/ML
50-200 INJECTION, SOLUTION INTRAMUSCULAR; INTRAVENOUS
Status: ACTIVE | OUTPATIENT
Start: 2018-06-08 | End: 2018-06-08

## 2018-06-08 RX ORDER — FLUMAZENIL 0.1 MG/ML
0.2 INJECTION INTRAVENOUS
Status: ACTIVE | OUTPATIENT
Start: 2018-06-08 | End: 2018-06-08

## 2018-06-08 RX ORDER — PROPOFOL 10 MG/ML
INJECTION, EMULSION INTRAVENOUS AS NEEDED
Status: DISCONTINUED | OUTPATIENT
Start: 2018-06-08 | End: 2018-06-08 | Stop reason: HOSPADM

## 2018-06-08 RX ORDER — EPINEPHRINE 0.1 MG/ML
1 INJECTION INTRACARDIAC; INTRAVENOUS
Status: ACTIVE | OUTPATIENT
Start: 2018-06-08 | End: 2018-06-09

## 2018-06-08 RX ORDER — DEXTROMETHORPHAN/PSEUDOEPHED 2.5-7.5/.8
1.2 DROPS ORAL
Status: DISCONTINUED | OUTPATIENT
Start: 2018-06-08 | End: 2018-06-11 | Stop reason: HOSPADM

## 2018-06-08 RX ORDER — SODIUM CHLORIDE 9 MG/ML
50 INJECTION, SOLUTION INTRAVENOUS CONTINUOUS
Status: DISPENSED | OUTPATIENT
Start: 2018-06-08 | End: 2018-06-08

## 2018-06-08 RX ORDER — SODIUM CHLORIDE 0.9 % (FLUSH) 0.9 %
5-10 SYRINGE (ML) INJECTION AS NEEDED
Status: ACTIVE | OUTPATIENT
Start: 2018-06-08 | End: 2018-06-08

## 2018-06-08 RX ADMIN — PROPOFOL 50 MG: 10 INJECTION, EMULSION INTRAVENOUS at 07:55

## 2018-06-08 RX ADMIN — PROPOFOL 90 MG: 10 INJECTION, EMULSION INTRAVENOUS at 07:49

## 2018-06-08 RX ADMIN — SODIUM CHLORIDE: 9 INJECTION, SOLUTION INTRAVENOUS at 07:45

## 2018-06-08 RX ADMIN — LIDOCAINE HYDROCHLORIDE 100 MG: 20 INJECTION, SOLUTION EPIDURAL; INFILTRATION; INTRACAUDAL; PERINEURAL at 07:49

## 2018-06-08 RX ADMIN — PROPOFOL 50 MG: 10 INJECTION, EMULSION INTRAVENOUS at 08:00

## 2018-06-08 RX ADMIN — PROPOFOL 50 MG: 10 INJECTION, EMULSION INTRAVENOUS at 07:52

## 2018-06-08 RX ADMIN — PROPOFOL 50 MG: 10 INJECTION, EMULSION INTRAVENOUS at 07:58

## 2018-06-08 NOTE — PROGRESS NOTES
Josefa Wick  1975  [id number]    Situation:  Verbal report received from: Debbie Barrios  Procedure: Procedure(s) with comments:  ESOPHAGOGASTRODUODENOSCOPY (EGD)  ENDOSCOPIC BANDING OR LIGATION - two bands placed    Background:    Preoperative diagnosis: CIRRHOSIS  Postoperative diagnosis: 1. portal gastropathy  2. small varices    :  Dr. Joan Mathew  Assistant(s): Endoscopy Technician-1: Precious Pruitt  Endoscopy RN-1: Sangeeta Woodward RN    Specimens: * No specimens in log *  H. Pylori  no    Assessment:  Intra-procedure medications       Anesthesia gave intra-procedure sedation and medications, see anesthesia flow sheet yes    Intravenous fluids: NS@ KVO     Vital signs stable     Abdominal assessment: round and soft    Recommendation:  Discharge patient per MD order.     Family or Friend   Permission to share finding with family or friend yes

## 2018-06-08 NOTE — ANESTHESIA PREPROCEDURE EVALUATION
Anesthetic History   No history of anesthetic complications            Review of Systems / Medical History  Patient summary reviewed, nursing notes reviewed and pertinent labs reviewed    Pulmonary          Smoker         Neuro/Psych   Within defined limits           Cardiovascular    Hypertension              Exercise tolerance: >4 METS  Comments: Not on beta blocker   GI/Hepatic/Renal           Liver disease     Endo/Other    Diabetes         Other Findings              Physical Exam    Airway  Mallampati: II  TM Distance: 4 - 6 cm  Neck ROM: normal range of motion   Mouth opening: Normal     Cardiovascular  Regular rate and rhythm,  S1 and S2 normal,  no murmur, click, rub, or gallop  Rhythm: regular  Rate: normal         Dental  No notable dental hx    Comments: Crowded teeth   Pulmonary  Breath sounds clear to auscultation               Abdominal  GI exam deferred       Other Findings            Anesthetic Plan    ASA: 3  Anesthesia type: MAC            Anesthetic plan and risks discussed with: Patient

## 2018-06-08 NOTE — PROCEDURES
70 Mattie Irving MD, [address], [facility] [place]       DILAN Jim, MAXIMUS Sotelo, ACNP-BC   DILAN Bey Mt, DILAN Gentile [facility] [address] 136    at [facility], [address] Gala Daniels  22.    [phone]    FAX: [address] [place] [address], [address]    [phone]    FAX: [phone]       UPPER ENDOSCOPY PROCEDURE NOTE    [facility]  1975    INDICATION: Cirrhosis. Screening for esophageal varices with variceal ligation if indicated. : Susana Ohara MD    ANESTHESIA/SEDATION: Propofol was administered by anesthesia      PROCEDURE DESCRIPTION:  [employer] consent was obtained from the patient for the procedure. All risks and benefits of the procedure explained. The patient was taken to the endoscopy suite and placed in the left lateral decubitus position. Following sequential administration of sedation to doses as indicated above the endoscope was inserted into the mouth and advanced under direct vision to the second portion of the duodenum. Careful inspection of upper gastrointestinal tract was made as the endoscope was inserted and withdrawn. Retroflexion of the endoscope to view of the cardia of the stomach was performed. After withdrawing the endoscope the banding devise was placed on the tip of the endoscope. The scope was then reinserted under direct inspection and advanced to the esophagus. Banding of esophageal varices was performed as described below. The scope was then removed. FINDINGS:  Esophagus:    A single large esophageal varices was identified. Banding of esophageal varices was performed.   Excellent hemostasis was achieved after banding. Stomach:   Mild portal hypertensive gastropathy of the body of the stomach. No gastric varicies identified. Duodenum:   Normal bulb and second portion    INTERVENTION:   2 bands placed were placed on esophageal varices. COMPLICATIONS: None. The patient tolerated the procedure well. EBL: Negligible. RECOMMENDATIONS:  Observe until discharge parameters are achieved. Liquid diet today. Soft food tomorrow. Resume general diet thereafter. Repeat endoscopy to reassess varices and need for additional banding in 6 months. Follow-up Liver [place] [facility] office as scheduled.       Satnam Bhatt MD  6/8/2018  8:02 AM

## 2018-06-08 NOTE — H&P
70 Mattie Irving MD, FACP, [place], [place]       DILAN Fuller PA-C Recardo Lorenzo, CUATEP-BC   DILAN Dyer NP [facility] 136    at [place] [address] [address], [address] [place]    [address] [facility] 22. [phone]    FAX: [address] [place] [address], [address]    [phone]    FAX: [phone]       PRE-PROCEDURE NOTE - EGD    H and P from last office visit reviewed. Allergies reviewed. Out-patient medicaton list reviewed. Patient Active Problem List   Diagnosis Code    Psoriasis L40.9    Obesity (BMI 30.0-34. 9) E66.9    HTN (hypertension) I10    Type II diabetes mellitus (HCC) E11.9    Incarcerated umbilical hernia O76.7    Type 2 diabetes mellitus with hemoglobin A1c goal of less than 7.0% (HCC) E11.9    Cirrhosis (HCC) K74.60    Ascites R18.8    S/P laparoscopy Z98.890       No Known Allergies    No current facility-administered medications on file prior to encounter. Current Outpatient Prescriptions on File Prior to Encounter   Medication Sig Dispense Refill    amLODIPine (NORVASC) 5 mg tablet Take 1 Tab by mouth daily. 90 Tab 1       For EGD to assess for esophageal and gastric varices. Plan to perform banding if indicated based upon variceal size and appearance. The risks of the procedure were discussed with the patient. These included reaction to anesthesia, pain, perforation and bleeding. All questions were answered. The patient wishes to proceed with the procedure. PHYSICAL EXAMINATION:  VS: per nursing note  General: No acute distress. Eyes: Sclera anicteric. ENT: No oral lesions. Thyroid normal.  Nodes: No adenopathy.    Skin: No spider angiomata. No jaundice. No palmar erythema. Respiratory: Lungs clear to auscultation. Cardiovascular: Regular heart rate. No murmurs. No JVD. Abdomen: Soft non-tender, liver size normal to percussion/palpation. Spleen not palpable. No obvious ascites. Extremities: No edema. No muscle wasting. No gross arthritic changes. Neurologic: Alert and oriented. Cranial nerves grossly intact. No asterixis. MOST RECENT LABORATORY STUDIES:  Lab Results   Component Value Date/Time    WBC 10.0 04/11/2018 01:22 PM    HGB 16.7 04/11/2018 01:22 PM    HCT 47.7 04/11/2018 01:22 PM    PLATELET 778 33/62/1904 01:22 PM    MCV 97 04/11/2018 01:22 PM     Lab Results   Component Value Date/Time    INR 1.1 03/19/2018 12:00 AM    INR CANCELED 03/02/2018 12:00 AM    Prothrombin time 11.4 03/19/2018 12:00 AM    Prothrombin time CANCELED 03/02/2018 12:00 AM       ASSESSMENT AND PLAN:  EGD to assess for esophageal and/or gastric varices. Conscious sedation with fentanyl and versed.     MD Alla [facility] 13 of [facility] of [address] [address] [facility] [address] [address] [address]  Gala Werner  22.  [phone]

## 2018-06-08 NOTE — DISCHARGE INSTRUCTIONS
[address] Mattie Irving MD, Ana Novak, [place], [place]       DILAN Nicholson PA-C Stan Harm, [facility]   Annita Fajardo, DILAN Mulligan [facility] 136    at [address]    [address] [place] [address], [address] Gala Daniels  22.    [phone]    FAX: [address] [place] [address], [address] [place] [address], [address]    [phone]    FAX: [phone]       ENDOSCOPY WITH BANDING DISCHARGE INSTRUCTIONS    Magan Toledo  1975  Date: 6/8/2018    DISCOMFORT:  Use lozenges or warm salt water gargle for sore thoat  Apply warm compress to IV site if red. If redness or soreness persists call the office. You may experience gas and bloating. Walking and belching will help relieve this. You may experience chest pain or discomfort or feel as though food is \"sticking\" in your food pipe for a few days after the procedure. This is a normal feeling after banding of esophageal varices. DIET:  Regular food may dislodge the bands placed on the varices. For this reason you should only have liquid for the rest of today. Eat only soft food that does not need to be chewed all day tomorrow. You may advance to your regular diet in 2 days. ACTIVITY:  Spend the remainder of the day resting. Avoid any strenuous activity. You may not operate a vehicle for 12 hours. You may not engage in an occupation involving machinery or appliances for rest of today. Avoid making any critical decisions for at least 24 hour.     Call the [facility] of [employer] if you have any of the following:  Increasing chest or abdominal pain, nausea, vomiting, vomiting blood, abdominal distension or swelling, fever or chills, bloody discharge from nose or mouth or shortness of breath. Follow-up Instructions:  Call Dr. Joan Mathew for any questions or problems at the phone number listed above. If a biopsy was performed, you will be contacted by the office staff or Dr Joan Mathew within 1 week. If you have not heard from us by then you may call the office at the phone number listed above to inquire about the results. ENDOSCOPY FINDINGS:  A single varices were found in the esophagus (food tube). 2 bands were placed to seal the varices and reduce the risk of bleeding. DISCHARGE SUMMARY from the Nurse: The following personal items collected during your admission are returned to you:   Dental Appliance: Dental Appliances: None  Vision:    Hearing Aid:    Jewelry:    Clothing:    Other Valuables:    Valuables sent to safe: [employer]    Thank you for requesting access to [facility] Please follow the instructions below to securely access and download your online medical record. [facility] allows you to send messages to your doctor, view your test results, renew your prescriptions, schedule appointments, and more. How Do I Sign Up? 1. In your internet browser, go to [email or web address]  2. Click on the First Time User? Click Here link in the Sign In box. You will be redirect to the New Member Sign Up page. 3. Enter your [facility] Access Code exactly as it appears below. You will not need to use this code after youve completed the sign-up process. If you do not sign up before the expiration date, you must request a new code. [facility] Access Code: [email or web address]  Expires: 2018  5:27 PM (This is the date your [facility] access code will )    4. Enter the last four digits of your Social Security Number (xxxx) and Date of Birth (mm/dd/yyyy) as indicated and click Submit. You will be taken to the next sign-up page. 5. Create a [facility] ID.  This will be your [facility]. login ID and cannot be changed, so think of one that is secure and easy to remember. 6. Create a [employer] password. You can change your password at any time. 7. Enter your Password Reset Question and Answer. This can be used at a later time if you forget your password. 8. Enter your e-mail address. You will receive e-mail notification when new information is available in [address] 9. Click Sign Up. You can now view and download portions of your medical record. 10. Click the Download Summary menu link to download a portable copy of your medical information. Additional Information    If you have questions, please visit the Frequently Asked Questions section of the [employer] website at [email or web address] Remember, [employer] is NOT to be used for urgent needs. For medical emergencies, dial 911. Cirrhosis: Care Instructions  Your Care Instructions    Cirrhosis occurs when healthy tissue in your liver gets scarred. This keeps the liver from working well. It usually happens after a liver has been inflamed for years. Cirrhosis is most often caused by alcohol abuse or hepatitis infection. But there are other causes too. These include medicines and too much fat in the liver. Conditions passed down in families and other disorders can also cause it. In some cases, no cause can be found. Treatment can't completely fix liver damage. But you may be able to slow or prevent more damage if you don't drink alcohol or use drugs that harm your liver. Follow-up care is a key part of your treatment and safety. Be sure to make and go to all appointments, and call your doctor if you are having problems. It's also a good idea to know your test results and keep a list of the medicines you take. How can you care for yourself at home? · Do not drink any alcohol. It can harm your liver. Talk to your doctor if you need help to stop drinking. · Be safe with medicines. Take your medicines exactly as prescribed.  Call your doctor if you think you are having a problem with your medicine. · Talk to your doctor before you take any other medicines. These include over-the-counter medicines and herbal products. · Be careful taking acetaminophen (Tylenol), ibuprofen (Advil, Motrin), or naproxen (Aleve). These can sometimes cause more liver damage. Talk with your doctor if you're not sure which medicines are safe. · If your cirrhosis causes extra fluid to build up in your body, try not to eat a lot of salt. Use less salt when you cook and at the table. Don't eat fast foods or snack foods with a lot of salt. Extra fluid in your belly, legs, and chest can cause serious problems. · Work with your doctor or a dietitian to be sure you eat the right amount of carbohydrate, protein, fat, and sodium (salt). It's very important to choose the best foods for the health of your liver. · If your doctor recommends it, limit how much fluid you drink. · If your doctor recommends it, get more exercise. Walking is a good choice. Bit by bit, increase the amount you walk every day. Try for at least 30 minutes on most days of the week. You also may want to swim, bike, or do other activities. When should you call for help? Call 911 anytime you think you may need emergency care. For example, call if:  ? · You have trouble breathing. ? · You vomit blood or what looks like coffee grounds. ?Call your doctor now or seek immediate medical care if:  ? · You feel very sleepy or confused. ? · You have new belly pain, or your pain gets worse. ? · You have a fever. ? · There is a new or increasing yellow tint to your skin or the whites of your eyes. ? · You have any abnormal bleeding, such as:  ¨ Nosebleeds. ¨ Vaginal bleeding that is different (heavier, more frequent, at a different time of the month) than what you are used to. ¨ Bloody or black stools, or rectal bleeding. ¨ Bloody or pink urine. ? Watch closely for changes in your health, and be sure to contact your doctor if:  ? · You have any problems. ? · Your belly is getting bigger. ? · You are gaining weight. Where can you learn more? Go to http://garth-el.info/. Enter M412 in the search box to learn more about \"Cirrhosis: Care Instructions. \"  Current as of: May 12, 2017  Content Version: 11.4  © 6749-3573 [employer]. Care instructions adapted under license by [employer] (which disclaims liability or warranty for this information). If you have questions about a medical condition or this instruction, always ask your healthcare professional. [address] any warranty or liability for your use of this information. Cirrhosis: Care Instructions  Your Care Instructions    Cirrhosis occurs when healthy tissue in your liver gets scarred. This keeps the liver from working well. It usually happens after a liver has been inflamed for years. Cirrhosis is most often caused by alcohol abuse or hepatitis infection. But there are other causes too. These include medicines and too much fat in the liver. Conditions passed down in families and other disorders can also cause it. In some cases, no cause can be found. Treatment can't completely fix liver damage. But you may be able to slow or prevent more damage if you don't drink alcohol or use drugs that harm your liver. Follow-up care is a key part of your treatment and safety. Be sure to make and go to all appointments, and call your doctor if you are having problems. It's also a good idea to know your test results and keep a list of the medicines you take. How can you care for yourself at home? · Do not drink any alcohol. It can harm your liver. Talk to your doctor if you need help to stop drinking. · Be safe with medicines. Take your medicines exactly as prescribed. Call your doctor if you think you are having a problem with your medicine. · Talk to your doctor before you take any other medicines.  These include over-the-counter medicines and herbal products. · Be careful taking acetaminophen (Tylenol), ibuprofen (Advil, Motrin), or naproxen (Aleve). These can sometimes cause more liver damage. Talk with your doctor if you're not sure which medicines are safe. · If your cirrhosis causes extra fluid to build up in your body, try not to eat a lot of salt. Use less salt when you cook and at the table. Don't eat fast foods or snack foods with a lot of salt. Extra fluid in your belly, legs, and chest can cause serious problems. · Work with your doctor or a dietitian to be sure you eat the right amount of carbohydrate, protein, fat, and sodium (salt). It's very important to choose the best foods for the health of your liver. · If your doctor recommends it, limit how much fluid you drink. · If your doctor recommends it, get more exercise. Walking is a good choice. Bit by bit, increase the amount you walk every day. Try for at least 30 minutes on most days of the week. You also may want to swim, bike, or do other activities. When should you call for help? Call 911 anytime you think you may need emergency care. For example, call if:  ? · You have trouble breathing. ? · You vomit blood or what looks like coffee grounds. ?Call your doctor now or seek immediate medical care if:  ? · You feel very sleepy or confused. ? · You have new belly pain, or your pain gets worse. ? · You have a fever. ? · There is a new or increasing yellow tint to your skin or the whites of your eyes. ? · You have any abnormal bleeding, such as:  ¨ Nosebleeds. ¨ Vaginal bleeding that is different (heavier, more frequent, at a different time of the month) than what you are used to. ¨ Bloody or black stools, or rectal bleeding. ¨ Bloody or pink urine. ? Watch closely for changes in your health, and be sure to contact your doctor if:  ? · You have any problems. ? · Your belly is getting bigger. ? · You are gaining weight.    Where can you learn more? Go to http://garth-el.info/. Enter M412 in the search box to learn more about \"Cirrhosis: Care Instructions. \"  Current as of: May 12, 2017  Content Version: 11.4  © 7695-5513 [employer]. Care instructions adapted under license by [employer] (which disclaims liability or warranty for this information). If you have questions about a medical condition or this instruction, always ask your healthcare professional. [address] any warranty or liability for your use of this information.

## 2018-06-08 NOTE — PROGRESS NOTES

## 2018-06-08 NOTE — IP AVS SNAPSHOT
[facility] 
[phone] Patient: Malena Everett MRN: [id number] ONJ:4[birth year]2588 About your hospitalization You were admitted on:  June 8, 2018 [facility] You were discharged on:  June 8, 2018 Why you were hospitalized Your primary diagnosis was:  Not on File Follow-up Information Follow up With Details Comments Contact Info Chente Cheatham MD   [address] 250 [facility] [address] 
[phone] Your Scheduled Appointments Friday June 22, 2018  3:30 PM EDT Follow Up with Aura Romberg, MD  
[facility] ([facility]) [address] [place] [address] [phone] [facility]  
[phone] Discharge Orders None A check radha indicates which time of day the medication should be taken. My Medications CONTINUE taking these medications Instructions Each Dose to Equal  
 Morning Noon Evening Bedtime  
 amLODIPine 5 mg tablet Commonly known as:  Chemo Lute Your last dose was: Your next dose is: Take 1 Tab by mouth daily. 5 mg Discharge Instructions [facility] 59 6089 [facility] Aura Romberg, MD, Fabiola Gonzalez, [facility] Espinoza, DILAN Gerardo, MAXIMUS Alejandro, [facility]   DILAN Garsia, DILAN [facility] 
  at [address], [address] [facility], [place]  22. 
  [phone] FAX: [address] [place] [address] 
  One [facility], [address] [place] [address] Drive [place], [address] [place] [address] Street [address] 
  [phone] FAX: [phone] ENDOSCOPY WITH BANDING DISCHARGE INSTRUCTIONS [place] Portillorosalia 1975 Date: 6/8/2018 DISCOMFORT: 
Use lozenges or warm salt water gargle for sore thoat Apply warm compress to IV site if red. If redness or soreness persists call the office. You may experience gas and bloating. Walking and belching will help relieve this. You may experience chest pain or discomfort or feel as though food is \"sticking\" in your food pipe for a few days after the procedure. This is a normal feeling after banding of esophageal varices. DIET: 
Regular food may dislodge the bands placed on the varices. For this reason you should only have liquid for the rest of today. Eat only soft food that does not need to be chewed all day tomorrow. You may advance to your regular diet in 2 days. ACTIVITY: 
Spend the remainder of the day resting. Avoid any strenuous activity. You may not operate a vehicle for 12 hours. You may not engage in an occupation involving machinery or appliances for rest of today. Avoid making any critical decisions for at least 24 hour. Call the [place] [address] [phone] if you have any of the following: 
Increasing chest or abdominal pain, nausea, vomiting, vomiting blood, abdominal distension or swelling, fever or chills, bloody discharge from nose or mouth or shortness of breath. Follow-up Instructions: 
Call Dr. Prashant Brandt for any questions or problems at the phone number listed above. If a biopsy was performed, you will be contacted by the office staff or Dr Prashant Brandt within 1 week. If you have not heard from us by then you may call the office at the phone number listed above to inquire about the results. ENDOSCOPY FINDINGS: 
A single varices were found in the esophagus (food tube). 2 bands were placed to seal the varices and reduce the risk of bleeding. DISCHARGE SUMMARY from the Nurse: The following personal items collected during your admission are returned to you: Dental Appliance: Dental Appliances: None Vision:   
Hearing Aid:   
Jewelry:   
Clothing:   
Other Valuables:   
Valuables sent to safe: MyChart Activation Thank you for requesting access to Mobile Ads. Please follow the instructions below to securely access and download your online medical record. Mobile Ads allows you to send messages to your doctor, view your test results, renew your prescriptions, schedule appointments, and more. How Do I Sign Up? 1. In your internet browser, go to [email or web address] 
2. Click on the First Time User? Click Here link in the Sign In box. You will be redirect to the New Member Sign Up page. 3. Enter your Mobile Ads Access Code exactly as it appears below. You will not need to use this code after youve completed the sign-up process. If you do not sign up before the expiration date, you must request a new code. Mobile Ads Access Code: [email or web address] Expires: 2018  5:27 PM (This is the date your Mobile Ads access code will ) 4. Enter the last four digits of your Social Security Number (xxxx) and Date of Birth (mm/dd/yyyy) as indicated and click Submit. You will be taken to the next sign-up page. 5. Create a Mobile Ads ID. This will be your Mobile Ads login ID and cannot be changed, so think of one that is secure and easy to remember. 6. Create a Mobile Ads password. You can change your password at any time. 7. Enter your Password Reset Question and Answer. This can be used at a later time if you forget your password. 8. Enter your e-mail address. You will receive e-mail notification when new information is available in [address] 9. Click Sign Up. You can now view and download portions of your medical record. 10. Click the Download Summary menu link to download a portable copy of your medical information. Additional Information If you have questions, please visit the Frequently Asked Questions section of the i[employer] website at [email or web address] Remember, iCat[employer]ult is NOT to be used for urgent needs. For medical emergencies, dial 911. Cirrhosis: Care Instructions Your Care Instructions Cirrhosis occurs when healthy tissue in your liver gets scarred. This keeps the liver from working well. It usually happens after a liver has been inflamed for years. Cirrhosis is most often caused by alcohol abuse or hepatitis infection. But there are other causes too. These include medicines and too much fat in the liver. Conditions passed down in families and other disorders can also cause it. In some cases, no cause can be found. Treatment can't completely fix liver damage. But you may be able to slow or prevent more damage if you don't drink alcohol or use drugs that harm your liver. Follow-up care is a key part of your treatment and safety. Be sure to make and go to all appointments, and call your doctor if you are having problems. It's also a good idea to know your test results and keep a list of the medicines you take. How can you care for yourself at home? · Do not drink any alcohol. It can harm your liver. Talk to your doctor if you need help to stop drinking. · Be safe with medicines. Take your medicines exactly as prescribed. Call your doctor if you think you are having a problem with your medicine. · Talk to your doctor before you take any other medicines. These include over-the-counter medicines and herbal products. · Be careful taking acetaminophen (Tylenol), ibuprofen (Advil, Motrin), or naproxen (Aleve). These can sometimes cause more liver damage. Talk with your doctor if you're not sure which medicines are safe. · If your cirrhosis causes extra fluid to build up in your body, try not to eat a lot of salt. Use less salt when you cook and at the table. Don't eat fast foods or snack foods with a lot of salt.  Extra fluid in your belly, legs, and chest can cause serious problems. · Work with your doctor or a dietitian to be sure you eat the right amount of carbohydrate, protein, fat, and sodium (salt). It's very important to choose the best foods for the health of your liver. · If your doctor recommends it, limit how much fluid you drink. · If your doctor recommends it, get more exercise. Walking is a good choice. Bit by bit, increase the amount you walk every day. Try for at least 30 minutes on most days of the week. You also may want to swim, bike, or do other activities. When should you call for help? Call 911 anytime you think you may need emergency care. For example, call if: 
? · You have trouble breathing. ? · You vomit blood or what looks like coffee grounds. ?Call your doctor now or seek immediate medical care if: 
? · You feel very sleepy or confused. ? · You have new belly pain, or your pain gets worse. ? · You have a fever. ? · There is a new or increasing yellow tint to your skin or the whites of your eyes. ? · You have any abnormal bleeding, such as: 
¨ Nosebleeds. ¨ Vaginal bleeding that is different (heavier, more frequent, at a different time of the month) than what you are used to. ¨ Bloody or black stools, or rectal bleeding. ¨ Bloody or pink urine. ? Watch closely for changes in your health, and be sure to contact your doctor if: 
? · You have any problems. ? · Your belly is getting bigger. ? · You are gaining weight. Where can you learn more? Go to http://garth-el.info/. Enter M412 in the search box to learn more about \"Cirrhosis: Care Instructions. \" Current as of: May 12, 2017 Content Version: 11.4 © 0082-2717 Healthwise, AdviceIQ. Care instructions adapted under license by [employer] (which disclaims liability or warranty for this information).  If you have questions about a medical condition or this instruction, always ask your healthcare professional. [address] any warranty or liability for your use of this information. Cirrhosis: Care Instructions Your Care Instructions Cirrhosis occurs when healthy tissue in your liver gets scarred. This keeps the liver from working well. It usually happens after a liver has been inflamed for years. Cirrhosis is most often caused by alcohol abuse or hepatitis infection. But there are other causes too. These include medicines and too much fat in the liver. Conditions passed down in families and other disorders can also cause it. In some cases, no cause can be found. Treatment can't completely fix liver damage. But you may be able to slow or prevent more damage if you don't drink alcohol or use drugs that harm your liver. Follow-up care is a key part of your treatment and safety. Be sure to make and go to all appointments, and call your doctor if you are having problems. It's also a good idea to know your test results and keep a list of the medicines you take. How can you care for yourself at home? · Do not drink any alcohol. It can harm your liver. Talk to your doctor if you need help to stop drinking. · Be safe with medicines. Take your medicines exactly as prescribed. Call your doctor if you think you are having a problem with your medicine. · Talk to your doctor before you take any other medicines. These include over-the-counter medicines and herbal products. · Be careful taking acetaminophen (Tylenol), ibuprofen (Advil, Motrin), or naproxen (Aleve). These can sometimes cause more liver damage. Talk with your doctor if you're not sure which medicines are safe. · If your cirrhosis causes extra fluid to build up in your body, try not to eat a lot of salt. Use less salt when you cook and at the table. Don't eat fast foods or snack foods with a lot of salt. Extra fluid in your belly, legs, and chest can cause serious problems. · Work with your doctor or a dietitian to be sure you eat the right amount of carbohydrate, protein, fat, and sodium (salt). It's very important to choose the best foods for the health of your liver. · If your doctor recommends it, limit how much fluid you drink. · If your doctor recommends it, get more exercise. Walking is a good choice. Bit by bit, increase the amount you walk every day. Try for at least 30 minutes on most days of the week. You also may want to swim, bike, or do other activities. When should you call for help? Call 911 anytime you think you may need emergency care. For example, call if: 
? · You have trouble breathing. ? · You vomit blood or what looks like coffee grounds. ?Call your doctor now or seek immediate medical care if: 
? · You feel very sleepy or confused. ? · You have new belly pain, or your pain gets worse. ? · You have a fever. ? · There is a new or increasing yellow tint to your skin or the whites of your eyes. ? · You have any abnormal bleeding, such as: 
¨ Nosebleeds. ¨ Vaginal bleeding that is different (heavier, more frequent, at a different time of the month) than what you are used to. ¨ Bloody or black stools, or rectal bleeding. ¨ Bloody or pink urine. ? Watch closely for changes in your health, and be sure to contact your doctor if: 
? · You have any problems. ? · Your belly is getting bigger. ? · You are gaining weight. Where can you learn more? Go to http://garth-el.info/. Enter M412 in the search box to learn more about \"Cirrhosis: Care Instructions. \" Current as of: May 12, 2017 Content Version: 11.4 © 2214-8682 Healthwise, [employer]. Care instructions adapted under license by [employer] (which disclaims liability or warranty for this information).  If you have questions about a medical condition or this instruction, always ask your healthcare professional. [employer] disclaims any warranty or liability for your use of this information. Introducing [facility]! [employer] introduces ScaleXtreme patient portal. Now you can access parts of your medical record, email your doctor's office, and request medication refills online. 1. In your internet browser, go to [email or web address] 2. Click on the First Time User? Click Here link in the Sign In box. You will see the New Member Sign Up page. 3. Enter your ScaleXtreme Access Code exactly as it appears below. You will not need to use this code after youve completed the sign-up process. If you do not sign up before the expiration date, you must request a new code. · ScaleXtreme Access Code: [email or web address] Expires: 9/5/2018  5:27 PM 
 
4. Enter the last four digits of your Social Security Number (xxxx) and Date of Birth (mm/dd/yyyy) as indicated and click Submit. You will be taken to the next sign-up page. 5. Create a ScaleXtreme ID. This will be your ScaleXtreme login ID and cannot be changed, so think of one that is secure and easy to remember. 6. Create a ScaleXtreme password. You can change your password at any time. 7. Enter your Password Reset Question and Answer. This can be used at a later time if you forget your password. 8. Enter your e-mail address. You will receive e-mail notification when new information is available in [address] 9. Click Sign Up. You can now view and download portions of your medical record. 10. Click the Download Summary menu link to download a portable copy of your medical information. If you have questions, please visit the Frequently Asked Questions section of the ScaleXtreme website. Remember, ScaleXtreme is NOT to be used for urgent needs. For medical emergencies, dial 911. Now available from your iPhone and Android! Introducing Jacoby Asencio As a [employer] patient, I wanted to make you aware of our electronic visit tool called Logic Product Grouparjunpbsi. 2CRisk 24/7 allows you to connect within minutes with a medical provider 24 hours a day, seven days a week via a mobile device or tablet or logging into a secure website from your computer. You can access Bevii from anywhere in the [place]. A virtual visit might be right for you when you have a simple condition and feel like you just dont want to get out of bed, or cant get away from work for an appointment, when your regular 2CRisk provider is not available (evenings, weekends or holidays), or when youre out of town and need minor care. Electronic visits cost only $49 and if the 2CRisk 24/7 provider determines a prescription is needed to treat your condition, one can be electronically transmitted to a nearby pharmacy*. Please take a moment to enroll today if you have not already done so. The enrollment process is free and takes just a few minutes. To enroll, please download the Prashant Cho 24/7 nathan to your tablet or phone, or visit [email or web address] to enroll on your computer. And, as an [address] [place] [address] patient with a [employer] account, the results of your visits will be scanned into your electronic medical record and your primary care provider will be able to view the scanned results. We urge you to continue to see your regular 2CRisk provider for your ongoing medical care. And while your primary care provider may not be the one available when you seek a Bevii virtual visit, the peace of mind you get from getting a real diagnosis real time can be priceless. For more information on Bevii, view our Frequently Asked Questions (FAQs) at [email or web address]. Sincerely, 
 
Domenica Abrams MD 
Chief Medical Officer Brain Santamaria *:  certain medications cannot be prescribed via Bevii Providers Seen During Your Hospitalization Provider Specialty Primary office phone Susana Ohara MD Hepatology [phone] Your Primary Care Physician (PCP) Primary Care Physician Office Phone Office Fax [facility] [phone] You are allergic to the following No active allergies Recent Documentation Smoking Status Light Tobacco Smoker Emergency Contacts Name Discharge Info Relation Home Work [facility] [place] [facility] [3] Sister [23]   [phone] [facility] DISCHARGE CAREGIVER [3] Brother [24]   [phone] Patient Belongings The following personal items are in your possession at time of discharge: 
  Dental Appliances: None Please provide this summary of care documentation to your next provider. Signatures-by signing, you are acknowledging that this After Visit Summary has been reviewed with you and you have received a copy. Patient Signature:  ____________________________________________________________ Date:  ____________________________________________________________  
  
[facility] Provider Signature:  ____________________________________________________________ Date:  ____________________________________________________________

## 2018-06-08 NOTE — ANESTHESIA POSTPROCEDURE EVALUATION
Post-Anesthesia Evaluation and Assessment    Patient: Monica Resendiz MRN: [id number]  SSN: [id number]    YOB: 1975  Age: 41 y.o. Sex: male       Cardiovascular Function/Vital Signs  Visit Vitals    /75    Pulse 98    Temp 36.7 °C (98 °F)    Resp 28    SpO2 96%       Patient is status post MAC anesthesia for Procedure(s):  ESOPHAGOGASTRODUODENOSCOPY (EGD)  ENDOSCOPIC BANDING OR LIGATION. Nausea/Vomiting: None    Postoperative hydration reviewed and adequate. Pain:  Pain Scale 1: Numeric (0 - 10) (06/08/18 4235)  Pain Intensity 1: 0 (06/08/18 8249)   Managed    Neurological Status: At baseline    Mental Status and Level of Consciousness: Arousable    Pulmonary Status:   O2 Device: Room air (06/08/18 4154)   Adequate oxygenation and airway patent    Complications related to anesthesia: None    Post-anesthesia assessment completed.  No concerns    Signed By: Ghazal Brock DO     June 8, 2018

## 2018-06-22 ENCOUNTER — OFFICE VISIT (OUTPATIENT)
Dept: HEMATOLOGY | Age: 43
End: 2018-06-22

## 2018-06-22 VITALS
HEIGHT: 69 IN | OXYGEN SATURATION: 98 % | BODY MASS INDEX: 32.82 KG/M2 | DIASTOLIC BLOOD PRESSURE: 93 MMHG | TEMPERATURE: 98.1 F | WEIGHT: 221.6 LBS | SYSTOLIC BLOOD PRESSURE: 155 MMHG | HEART RATE: 90 BPM

## 2018-06-22 DIAGNOSIS — K74.60 CIRRHOSIS OF LIVER WITHOUT ASCITES, UNSPECIFIED HEPATIC CIRRHOSIS TYPE (HCC): Primary | ICD-10-CM

## 2018-06-22 RX ORDER — FUROSEMIDE 20 MG/1
20 TABLET ORAL DAILY
Qty: 90 TAB | Refills: 3 | Status: SHIPPED | OUTPATIENT
Start: 2018-06-22 | End: 2018-12-04 | Stop reason: SDUPTHER

## 2018-06-22 RX ORDER — SPIRONOLACTONE 50 MG/1
50 TABLET, FILM COATED ORAL DAILY
Qty: 90 TAB | Refills: 3 | Status: SHIPPED | OUTPATIENT
Start: 2018-06-22 | End: 2018-10-18 | Stop reason: ALTCHOICE

## 2018-06-22 NOTE — MR AVS SNAPSHOT
1111 [facility] [phone] [address] [place] [address] 
[phone] Patient: Yvonne Guzmán MRN: [id number] ZJA:6[birth year]8/[id number] Visit Information Date & Time Provider Department Dept. Phone Encounter #  
 6/22/2018  3:30 PM Ishan Bermankavita Luo of [place] [address] [id number] Follow-up Instructions Return for 1 week after EGD. Your Appointments 7/20/2018  7:30 AM  
PROCEDURE with MD Danna Berman 75 [facility]) Appt Note: EGD  
 [address] [facility] [id number] [address] [place] [address]  
[phone]  
  
   
 [address] [address]  
  
    
 7/27/2018  8:15 AM  
Follow Up with MD Danna Berman 75 ([facility]) [address] [facility] [phone] [facility] [id number]  
59 [facility] [address] Upcoming Health Maintenance Date Due  
 LIPID PANEL Q1 1975 FOOT EXAM Q1 5/3/1987 EYE EXAM RETINAL OR DILATED Q1 5/3/1987 Pneumococcal 19-64 Medium Risk (1 of 1 - PPSV23) 5/3/1996 DTaP/Tdap/Td series (1 - Tdap) 5/3/1998 HEMOGLOBIN A1C Q6M 4/27/2018 Influenza Age 5 to Adult 8/1/2018 MICROALBUMIN Q1 10/27/2018 Allergies as of 6/22/2018  Review Complete On: 6/22/2018 By: Erin Oneill LPN No Known Allergies Current Immunizations  Reviewed on 10/27/2017 Name Date  
 TB Skin Test (PPD) 4/1/2017 Not reviewed this visit You Were Diagnosed With   
  
 Codes Comments Cirrhosis of liver without ascites, unspecified hepatic cirrhosis type ([facility].)    -  Primary ICD-10-CM: K74.60 ICD-9-CM: 571.5 Vitals BP Pulse Temp Height(growth percentile) (!) 155/93 (BP 1 Location: Left arm, BP Patient Position: Sitting) 90 98.1 °F (36.7 °C) (Tympanic) 5' 9\" (1.753 m) Weight(growth percentile) SpO2 BMI Smoking Status 221 lb 9.6 oz (100.5 kg) 98% 32.72 kg/m2 Light Tobacco Smoker Vitals History BMI and BSA Data Body Mass Index Body Surface Area 32.72 kg/m 2 2.21 m 2 Preferred Pharmacy Pharmacy Name Phone [facility]/PHARMACY #[id number] Najera, [address] [phone] Your Updated Medication List  
  
   
This list is accurate as of 6/22/18  4:12 PM.  Always use your most recent med list. amLODIPine 5 mg tablet Commonly known as:  [place] Mend Take 1 Tab by mouth daily. furosemide 20 mg tablet Commonly known as:  LASIX Take 1 Tab by mouth daily. spironolactone 50 mg tablet Commonly known as:  ALDACTONE Take 1 Tab by mouth daily. Prescriptions Sent to Pharmacy Refills  
 furosemide (LASIX) 20 mg tablet 3 Sig: Take 1 Tab by mouth daily. Class: Normal  
 Pharmacy: [facility]/pharmacy #[id number] - [place], [facility] Ph #: [phone] Route: Oral  
 spironolactone (ALDACTONE) 50 mg tablet 3 Sig: Take 1 Tab by mouth daily. Class: Normal  
 Pharmacy: [facility]/pharmacy #[id number] - [place], [facility] [address] [facility] Ph #: [phone] Route: Oral  
  
We Performed the Following CBC WITH AUTOMATED DIFF [23979 CPT(R)] HEPATIC FUNCTION PANEL [43988 CPT(R)] METABOLIC PANEL, BASIC [44029 CPT(R)] PROTHROMBIN TIME + INR [27196 CPT(R)] Follow-up Instructions Return for 1 week after EGD. To-Do List   
 06/22/2018 Imaging:  [facility]   
  
 07/22/2018 GI:  EGD Introducing [facility] [place] [facility]! [facility] introduces [employer] patient portal. Now you can access parts of your medical record, email your doctor's office, and request medication refills online. 1. In your internet browser, go to https://[employer]. English [employer] 2. Click on the First Time User? Click Here link in the Sign In box. You will see the New Member Sign Up page. 3. Enter your [employer] Access Code exactly as it appears below.  You will not need to use this code after youve completed the sign-up process. If you do not sign up before the expiration date, you must request a new code. · [employer] Access Code: [email or web address] Expires: 9/5/2018  5:27 PM 
 
4. Enter the last four digits of your Social Security Number (xxxx) and Date of Birth (mm/dd/yyyy) as indicated and click Submit. You will be taken to the next sign-up page. 5. Create a [employer] ID. This will be your [employer] login ID and cannot be changed, so think of one that is secure and easy to remember. 6. Create a [employer] password. You can change your password at any time. 7. Enter your Password Reset Question and Answer. This can be used at a later time if you forget your password. 8. Enter your e-mail address. You will receive e-mail notification when new information is available in [address] 9. Click Sign Up. You can now view and download portions of your medical record. 10. Click the Download Summary menu link to download a portable copy of your medical information. If you have questions, please visit the Frequently Asked Questions section of the [employer] website. Remember, [employer] is NOT to be used for urgent needs. For medical emergencies, dial 911. Now available from your iPhone and Android! Please provide this summary of care documentation to your next provider. Your primary care clinician is listed as Phuong Logan. If you have any questions after today's visit, please call [phone].

## 2018-06-22 NOTE — PROGRESS NOTES
Chief Complaint   Patient presents with    Follow-up     lbx follow up     Visit Vitals    BP (!) 155/93 (BP 1 Location: Left arm, BP Patient Position: Sitting)    Pulse 90    Temp 98.1 °F (36.7 °C) (Tympanic)    Ht 5' 9\" (1.753 m)    Wt 221 lb 9.6 oz (100.5 kg)    SpO2 98%    BMI 32.72 kg/m2     PHQ over the last two weeks 3/2/2018   Little interest or pleasure in doing things Not at all   Feeling down, depressed or hopeless Not at all   Total Score PHQ 2 0     1. Have you been to the ER, urgent care clinic since your last visit? Hospitalized since your last visit? No    2. Have you seen or consulted any other health care providers outside of the [address] [place] [address] since your last visit? Include any pap smears or colon screening.  No

## 2018-06-23 LAB
ALBUMIN SERPL-MCNC: 4.6 G/DL (ref 3.5–5.5)
ALP SERPL-CCNC: 123 IU/L (ref 39–117)
ALT SERPL-CCNC: 46 IU/L (ref 0–44)
AST SERPL-CCNC: 56 IU/L (ref 0–40)
BASOPHILS # BLD AUTO: 0 X10E3/UL (ref 0–0.2)
BASOPHILS NFR BLD AUTO: 0 %
BILIRUB DIRECT SERPL-MCNC: 0.33 MG/DL (ref 0–0.4)
BILIRUB SERPL-MCNC: 0.9 MG/DL (ref 0–1.2)
BUN SERPL-MCNC: 10 MG/DL (ref 6–24)
BUN/CREAT SERPL: 13 (ref 9–20)
CALCIUM SERPL-MCNC: 9.4 MG/DL (ref 8.7–10.2)
CHLORIDE SERPL-SCNC: 98 MMOL/L (ref 96–106)
CO2 SERPL-SCNC: 25 MMOL/L (ref 20–29)
CREAT SERPL-MCNC: 0.76 MG/DL (ref 0.76–1.27)
EOSINOPHIL # BLD AUTO: 0.2 X10E3/UL (ref 0–0.4)
EOSINOPHIL NFR BLD AUTO: 2 %
ERYTHROCYTE [DISTWIDTH] IN BLOOD BY AUTOMATED COUNT: 12.7 % (ref 12.3–15.4)
GFR SERPLBLD CREATININE-BSD FMLA CKD-EPI: 113 ML/MIN/1.73
GFR SERPLBLD CREATININE-BSD FMLA CKD-EPI: 131 ML/MIN/1.73
GLUCOSE SERPL-MCNC: 102 MG/DL (ref 65–99)
HCT VFR BLD AUTO: 43.4 % (ref 37.5–51)
HGB BLD-MCNC: 15.3 G/DL (ref 13–17.7)
IMM GRANULOCYTES # BLD: 0 X10E3/UL (ref 0–0.1)
IMM GRANULOCYTES NFR BLD: 0 %
INR PPP: 1 (ref 0.8–1.2)
LYMPHOCYTES # BLD AUTO: 3.7 X10E3/UL (ref 0.7–3.1)
LYMPHOCYTES NFR BLD AUTO: 37 %
MCH RBC QN AUTO: 32.3 PG (ref 26.6–33)
MCHC RBC AUTO-ENTMCNC: 35.3 G/DL (ref 31.5–35.7)
MCV RBC AUTO: 92 FL (ref 79–97)
MONOCYTES # BLD AUTO: 0.9 X10E3/UL (ref 0.1–0.9)
MONOCYTES NFR BLD AUTO: 9 %
NEUTROPHILS # BLD AUTO: 5.1 X10E3/UL (ref 1.4–7)
NEUTROPHILS NFR BLD AUTO: 52 %
PLATELET # BLD AUTO: 255 X10E3/UL (ref 150–379)
POTASSIUM SERPL-SCNC: 4.1 MMOL/L (ref 3.5–5.2)
PROT SERPL-MCNC: 8.1 G/DL (ref 6–8.5)
PROTHROMBIN TIME: 11 SEC (ref 9.1–12)
RBC # BLD AUTO: 4.74 X10E6/UL (ref 4.14–5.8)
SODIUM SERPL-SCNC: 137 MMOL/L (ref 134–144)
WBC # BLD AUTO: 10 X10E3/UL (ref 3.4–10.8)

## 2018-06-29 NOTE — PROGRESS NOTES
Per Dr. Cony Steel - results reviewed with patient. Patient expressed an understanding. No further questions.

## 2018-08-11 DIAGNOSIS — I10 ESSENTIAL HYPERTENSION: ICD-10-CM

## 2018-08-12 RX ORDER — AMLODIPINE BESYLATE 5 MG/1
TABLET ORAL
Qty: 90 TAB | Refills: 1 | Status: SHIPPED | OUTPATIENT
Start: 2018-08-12 | End: 2019-01-23 | Stop reason: SDUPTHER

## 2018-09-07 ENCOUNTER — OFFICE VISIT (OUTPATIENT)
Dept: INTERNAL MEDICINE CLINIC | Age: 43
End: 2018-09-07

## 2018-09-07 VITALS
BODY MASS INDEX: 32.73 KG/M2 | HEIGHT: 69 IN | DIASTOLIC BLOOD PRESSURE: 87 MMHG | SYSTOLIC BLOOD PRESSURE: 146 MMHG | WEIGHT: 221 LBS | OXYGEN SATURATION: 97 % | RESPIRATION RATE: 18 BRPM | HEART RATE: 87 BPM | TEMPERATURE: 98.2 F

## 2018-09-07 DIAGNOSIS — I10 ESSENTIAL HYPERTENSION: Primary | ICD-10-CM

## 2018-09-07 DIAGNOSIS — R73.09 ELEVATED GLUCOSE LEVEL: ICD-10-CM

## 2018-09-07 DIAGNOSIS — R18.8 CIRRHOSIS OF LIVER WITH ASCITES, UNSPECIFIED HEPATIC CIRRHOSIS TYPE (HCC): ICD-10-CM

## 2018-09-07 DIAGNOSIS — K42.9 UMBILICAL HERNIA WITHOUT OBSTRUCTION AND WITHOUT GANGRENE: ICD-10-CM

## 2018-09-07 DIAGNOSIS — K74.60 CIRRHOSIS OF LIVER WITH ASCITES, UNSPECIFIED HEPATIC CIRRHOSIS TYPE (HCC): ICD-10-CM

## 2018-09-07 PROBLEM — E11.9 TYPE 2 DIABETES MELLITUS WITH HEMOGLOBIN A1C GOAL OF LESS THAN 7.0% (HCC): Status: RESOLVED | Noted: 2017-11-10 | Resolved: 2018-09-07

## 2018-09-07 PROBLEM — E11.9 TYPE II DIABETES MELLITUS (HCC): Status: RESOLVED | Noted: 2017-11-06 | Resolved: 2018-09-07

## 2018-09-07 LAB — HBA1C MFR BLD HPLC: 5.9 %

## 2018-09-07 RX ORDER — NEBIVOLOL 10 MG/1
TABLET ORAL
Qty: 15 TAB | Refills: 1 | Status: SHIPPED | OUTPATIENT
Start: 2018-09-07 | End: 2018-10-18

## 2018-09-07 NOTE — PROGRESS NOTES
HISTORY OF PRESENT ILLNESS Alex oFster is a 41 y.o. male. HPI Hypertension: 
Alex Foster is a 41 y.o. male with hypertension. without Chronic kidney disease stage Medication change since last visit: No 
The patient reports:  taking medications as instructed, no medication side effects noted, patient does not perform home BP monitoring, no chest pain on exertion, no dyspnea on exertion, no swelling of ankles, no orthostatic dizziness or lightheadedness, no palpitations. Lifestyle modification/social history: generally follows a low sodium diet Lab Results Component Value Date/Time Sodium 137 06/22/2018 12:00 AM  
 Potassium 4.1 06/22/2018 12:00 AM  
 Chloride 98 06/22/2018 12:00 AM  
 CO2 25 06/22/2018 12:00 AM  
 Glucose 102 (H) 06/22/2018 12:00 AM  
 BUN 10 06/22/2018 12:00 AM  
 Creatinine 0.76 06/22/2018 12:00 AM  
 BUN/Creatinine ratio 13 06/22/2018 12:00 AM  
 GFR est  06/22/2018 12:00 AM  
 GFR est non- 06/22/2018 12:00 AM  
 Calcium 9.4 06/22/2018 12:00 AM  
 
 
 
Diabetes: He is here for follow up of diabetes. Proteinuria: no 
Neuropathy: no 
Medication change since last visit:  Not applicable Diabetic Review of Systems - diabetic diet compliance: compliant most of the time. Lab Results Component Value Date/Time Hemoglobin A1c (POC) 5.9 09/07/2018 10:10 AM  
 
Lab Results Component Value Date/Time Microalb/Creat ratio (ug/mg creat.) 25.3 10/27/2017 03:32 PM  
 
 
 
Last Point of Care HGB A1C Hemoglobin A1c (POC) Date Value Ref Range Status 09/07/2018 5.9 % Final  
  
 
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 No encephalopathy. Followed by Dr. Cevallos Sports now. Recent notes and labs reviewed. Patient Active Problem List  
Diagnosis Code  Psoriasis L40.9  Obesity (BMI 30.0-34. 9) E66.9  
 HTN (hypertension) I10  
 Incarcerated umbilical hernia P13.2  Cirrhosis ([address] K74.60  Ascites R18.8  S/P laparoscopy [id number] Past Medical History:  
Diagnosis Date  Diabetes ([facility]   
 no medication  Hypertension  Ill-defined condition   
 hernia - navel  Liver disease   
 cirrhosis  Obesity (BMI 30.0-34. 9)  Psoriasis No Known Allergies Current Outpatient Prescriptions on File Prior to Visit Medication Sig Dispense Refill  amLODIPine (NORVASC) 5 mg tablet TAKE 1 TABLET BY MOUTH DAILY. 90 Tab 1  
 furosemide (LASIX) 20 mg tablet Take 1 Tab by mouth daily. 90 Tab 3  
 spironolactone (ALDACTONE) 50 mg tablet Take 1 Tab by mouth daily. 90 Tab 3 No current facility-administered medications on file prior to visit. Social History Substance Use Topics  Smoking status: Light Tobacco Smoker Types: Cigars  Smokeless tobacco: Never Used Comment: x1 week - cigar smoker  Alcohol use Yes Comment: 24 ounce beer per week/2 beers per week now per pt on 6/7/2018 ROS Physical Exam  
Constitutional: He appears well-developed and well-nourished. No distress. /87 (BP 1 Location: Left arm, BP Patient Position: Sitting)  Pulse 87  Temp 98.2 °F (36.8 °C) (Oral)   Resp 18  Ht 5' 9\" (1.753 m)  Wt 221 lb (100.2 kg)  SpO2 97%  BMI 32.64 kg/m2Body mass index is 32.64 kg/(m^2). HENT:  
Mouth/Throat: Oropharynx is clear and moist.  
Neck: No JVD present. Carotid bruit is not present. Cardiovascular: Normal rate, regular rhythm, normal heart sounds and intact distal pulses. Pulmonary/Chest: Effort normal and breath sounds normal. He has no decreased breath sounds. He has no wheezes. He has no rhonchi. He has no rales. Abdominal: Soft. Bowel sounds are normal. He exhibits no distension and no mass. There is no hepatosplenomegaly. There is no tenderness. There is no rigidity and no guarding. A hernia (large umbilical hernia with mild skin irritation.  ) is present. Musculoskeletal: He exhibits no edema. Neurological: He is alert. No asterixis Skin: Skin is warm and dry. He is not diaphoretic. Nursing note and vitals reviewed. ASSESSMENT and PLAN Diagnoses and all orders for this visit: 1. Essential hypertension -not to goal.  Add BB given hx of varices. Log blood pressures at home while sitting, relaxed 3-5 times weekly and bring to next visit. Pt educated on goal BP of 130/80 on average or lower. Call office as soon as possible if BP's over 140/90 or below 110/50 on multiple occasions and/or with symptoms of dizziness, chest pain, shortness of breath, headache or ankle swelling. Recheck log and bp here in 1 month(s). -     nebivolol (BYSTOLIC) 10 mg tablet; 1/2 tab daily 2. Elevated glucose level - now borderline. We discussed diet management of his prediabetes or diabetes. he is advised to reduce complex carbs/ starches, avoid breads if possible and avoid concentrated sweets and drinks. -     AMB POC HEMOGLOBIN A1C 
 
3. Cirrhosis of liver with ascites, unspecified hepatic cirrhosis type ([facility] - avoid alcohol at all costs. Follow up planned with hepatology. 4. Umbilical hernia without obstruction and without gangrene - advised good skin care. Can use neosporin or vaseline to dry area. Follow-up Disposition: 
Return in about 4 weeks (around 10/5/2018).

## 2018-10-18 ENCOUNTER — OFFICE VISIT (OUTPATIENT)
Dept: INTERNAL MEDICINE CLINIC | Age: 43
End: 2018-10-18

## 2018-10-18 VITALS
HEART RATE: 87 BPM | OXYGEN SATURATION: 100 % | DIASTOLIC BLOOD PRESSURE: 88 MMHG | SYSTOLIC BLOOD PRESSURE: 137 MMHG | RESPIRATION RATE: 18 BRPM | TEMPERATURE: 98.7 F | BODY MASS INDEX: 32.9 KG/M2 | WEIGHT: 222.13 LBS | HEIGHT: 69 IN

## 2018-10-18 DIAGNOSIS — I10 ESSENTIAL HYPERTENSION: Primary | ICD-10-CM

## 2018-10-18 DIAGNOSIS — R19.4 CHANGE IN BOWEL HABIT: ICD-10-CM

## 2018-10-18 DIAGNOSIS — K42.9 UMBILICAL HERNIA WITHOUT OBSTRUCTION AND WITHOUT GANGRENE: ICD-10-CM

## 2018-10-18 RX ORDER — NEBIVOLOL 10 MG/1
10 TABLET ORAL DAILY
Qty: 30 TAB | Refills: 1 | Status: SHIPPED | OUTPATIENT
Start: 2018-10-18 | End: 2018-12-18 | Stop reason: SDUPTHER

## 2018-10-18 RX ORDER — MUPIROCIN 20 MG/G
OINTMENT TOPICAL DAILY
Qty: 22 G | Refills: 0 | Status: SHIPPED | OUTPATIENT
Start: 2018-10-18 | End: 2019-01-04

## 2018-10-18 NOTE — PROGRESS NOTES
HISTORY OF PRESENT ILLNESS Rosy Braswell is a 41 y.o. male. HPI Hypertension: 
Rosy Braswell is a 41 y.o. male with hypertension. without Chronic kidney disease stage Medication change since last visit: Yes: Comment: added bystolic The patient reports:  taking medications as instructed, no medication side effects noted, home BP monitoring in range of 452'P systolic over 83'V diastolic, no chest pain on exertion, no dyspnea on exertion, no swelling of ankles, no orthostatic dizziness or lightheadedness, no palpitations. Lifestyle modification/social history: generally follows a low fat low cholesterol diet, nonsmoker Lab Results Component Value Date/Time Sodium 137 06/22/2018 12:00 AM  
 Potassium 4.1 06/22/2018 12:00 AM  
 Chloride 98 06/22/2018 12:00 AM  
 CO2 25 06/22/2018 12:00 AM  
 Glucose 102 (H) 06/22/2018 12:00 AM  
 BUN 10 06/22/2018 12:00 AM  
 Creatinine 0.76 06/22/2018 12:00 AM  
 BUN/Creatinine ratio 13 06/22/2018 12:00 AM  
 GFR est  06/22/2018 12:00 AM  
 GFR est non- 06/22/2018 12:00 AM  
 Calcium 9.4 06/22/2018 12:00 AM  
 
 
 
Large umbilical hernia - he feels it is getting larger. He's had some skin breakdown on superior area of hernia. Using neosporin. Whitish discharge noted. He has also noted bowel urgency with thinning of stool caliber. No BPR, melena. Occasional tenesmus. No nausea or vomiting. The patient denies fevers, chills or sweats. He's to have another EGD to follow up esophageal varicies in Nov.   
 
Patient Active Problem List  
Diagnosis Code  Psoriasis L40.9  Obesity (BMI 30.0-34. 9) E66.9  
 HTN (hypertension) I10  
 Incarcerated umbilical hernia U89.2  Cirrhosis ([facility] K74.60  Ascites R18.8  S/P laparoscopy [id number] Past Medical History:  
Diagnosis Date  [facility]   
 no medication  Hypertension  Ill-defined condition   
 hernia - navel  Liver disease   
 cirrhosis  Obesity (BMI 30.0-34. 9)  Psoriasis No Known Allergies Current Outpatient Medications on File Prior to Visit Medication Sig Dispense Refill  amLODIPine (NORVASC) 5 mg tablet TAKE 1 TABLET BY MOUTH DAILY. 90 Tab 1  
 furosemide (LASIX) 20 mg tablet Take 1 Tab by mouth daily. 90 Tab 3 No current facility-administered medications on file prior to visit. Social History Tobacco Use  Smoking status: Light Tobacco Smoker Types: Cigars  Smokeless tobacco: Never Used  Tobacco comment: x1 week - cigar smoker Substance Use Topics  Alcohol use: Yes Comment: 24 ounce beer per week/2 beers per week now per pt on 6/7/2018  Drug use: No  
 
 
 
Review of Systems Constitutional: Negative for malaise/fatigue and weight loss. Respiratory: Negative. Negative for shortness of breath. Cardiovascular: Negative. Negative for chest pain, palpitations and leg swelling. Gastrointestinal: Positive for diarrhea. Negative for abdominal pain, blood in stool, constipation, melena, nausea and vomiting. Genitourinary: Negative for dysuria, frequency and urgency. Musculoskeletal: Negative for myalgias. No Muscle cramping Skin:  
     Shallow ulceration on umbilical hernia Neurological: Negative for dizziness, focal weakness, loss of consciousness, weakness and headaches. Physical Exam  
Constitutional: He appears well-developed and well-nourished. No distress. /88 (BP 1 Location: Right arm, BP Patient Position: Sitting)   Pulse 87   Temp 98.7 °F (37.1 °C) (Oral)   Resp 18   Ht 5' 9\" (1.753 m)   Wt 222 lb 2 oz (100.8 kg)   SpO2 100%   BMI 32.80 kg/m² Body mass index is 32.8 kg/m². HENT:  
Mouth/Throat: Oropharynx is clear and moist.  
Neck: No JVD present. Carotid bruit is not present. Cardiovascular: Normal rate, regular rhythm, normal heart sounds and intact distal pulses.   
Pulmonary/Chest: Effort normal and breath sounds normal.  
 Abdominal: Soft. Bowel sounds are normal. He exhibits ascites (mild). He exhibits no distension and no mass. There is no hepatosplenomegaly. A hernia is present. Large 4 cm reducible hernia , nontender. Superficial ulceration over top of hernia where abutting abdomen. Mild yellowish discharge. Musculoskeletal: He exhibits no edema. Neurological: He is alert. Skin: Skin is warm and dry. He is not diaphoretic. Nursing note and vitals reviewed. ASSESSMENT and PLAN Diagnoses and all orders for this visit: 
 
Essential hypertension - increase BB. Log blood pressures at home while sitting, relaxed 3-5 times weekly and bring to next visit. Pt educated on goal BP of 130/80 on average or lower. Call office as soon as possible if BP's over 140/90 or below 110/50 on multiple occasions and/or with symptoms of dizziness, chest pain, shortness of breath, headache or ankle swelling. Recheck log and bp here in 3 month(s). -     nebivolol (BYSTOLIC) 10 mg tablet; Take 1 Tab by mouth daily. Umbilical hernia without obstruction and without gangrene - add mupirocin for local skin ulceration. Ultimately needs follow up with Dr Chirinos Carry again 
-     mupirocin [facility]) 2 % ointment; Apply  to affected area daily. Change in bowel habit/ stool caliber, urgency. He needs further workup by GI. ? Colonoscopy at same time as future EGD. Will forward to Dr. Marti Krishnan to see if that is feasible or can refer to general GI. Follow-up Disposition: 
Return in about 3 months (around 1/18/2019).

## 2018-11-23 ENCOUNTER — HOSPITAL ENCOUNTER (OUTPATIENT)
Age: 43
Setting detail: OUTPATIENT SURGERY
Discharge: HOME | End: 2018-11-23
Attending: INTERNAL MEDICINE | Admitting: INTERNAL MEDICINE
Payer: COMMERCIAL

## 2018-11-23 ENCOUNTER — ANESTHESIA EVENT (OUTPATIENT)
Dept: ENDOSCOPY | Age: 43
End: 2018-11-23
Payer: COMMERCIAL

## 2018-11-23 ENCOUNTER — ANESTHESIA (OUTPATIENT)
Dept: ENDOSCOPY | Age: 43
End: 2018-11-23
Payer: COMMERCIAL

## 2018-11-23 VITALS
TEMPERATURE: 98 F | DIASTOLIC BLOOD PRESSURE: 78 MMHG | WEIGHT: 222 LBS | HEIGHT: 69 IN | OXYGEN SATURATION: 96 % | BODY MASS INDEX: 32.88 KG/M2 | SYSTOLIC BLOOD PRESSURE: 136 MMHG

## 2018-11-23 PROCEDURE — 74011250636 HC RX REV CODE- 250/636

## 2018-11-23 PROCEDURE — 74011000258 HC RX REV CODE- 258: Performed by: INTERNAL MEDICINE

## 2018-11-23 PROCEDURE — 76060000031 HC ANESTHESIA FIRST 0.5 HR: Performed by: INTERNAL MEDICINE

## 2018-11-23 PROCEDURE — 88305 TISSUE EXAM BY PATHOLOGIST: CPT

## 2018-11-23 PROCEDURE — 77030009426 HC FCPS BIOP ENDOSC BSC -B: Performed by: INTERNAL MEDICINE

## 2018-11-23 PROCEDURE — 88342 IMHCHEM/IMCYTCHM 1ST ANTB: CPT

## 2018-11-23 PROCEDURE — 76040000019: Performed by: INTERNAL MEDICINE

## 2018-11-23 RX ORDER — FLUMAZENIL 0.1 MG/ML
0.2 INJECTION INTRAVENOUS
Status: DISCONTINUED | OUTPATIENT
Start: 2018-11-23 | End: 2018-11-23 | Stop reason: HOSPADM

## 2018-11-23 RX ORDER — MIDAZOLAM HYDROCHLORIDE 1 MG/ML
5-10 INJECTION, SOLUTION INTRAMUSCULAR; INTRAVENOUS
Status: DISCONTINUED | OUTPATIENT
Start: 2018-11-23 | End: 2018-11-23 | Stop reason: HOSPADM

## 2018-11-23 RX ORDER — DEXTROMETHORPHAN/PSEUDOEPHED 2.5-7.5/.8
1.2 DROPS ORAL
Status: DISCONTINUED | OUTPATIENT
Start: 2018-11-23 | End: 2018-11-23 | Stop reason: HOSPADM

## 2018-11-23 RX ORDER — FENTANYL CITRATE 50 UG/ML
50-200 INJECTION, SOLUTION INTRAMUSCULAR; INTRAVENOUS
Status: DISCONTINUED | OUTPATIENT
Start: 2018-11-23 | End: 2018-11-23 | Stop reason: HOSPADM

## 2018-11-23 RX ORDER — NALOXONE HYDROCHLORIDE 0.4 MG/ML
0.4 INJECTION, SOLUTION INTRAMUSCULAR; INTRAVENOUS; SUBCUTANEOUS
Status: DISCONTINUED | OUTPATIENT
Start: 2018-11-23 | End: 2018-11-23 | Stop reason: HOSPADM

## 2018-11-23 RX ORDER — SODIUM CHLORIDE 9 MG/ML
50 INJECTION, SOLUTION INTRAVENOUS CONTINUOUS
Status: DISCONTINUED | OUTPATIENT
Start: 2018-11-23 | End: 2018-11-23 | Stop reason: HOSPADM

## 2018-11-23 RX ORDER — ATROPINE SULFATE 0.1 MG/ML
0.5 INJECTION INTRAVENOUS
Status: DISCONTINUED | OUTPATIENT
Start: 2018-11-23 | End: 2018-11-23 | Stop reason: HOSPADM

## 2018-11-23 RX ORDER — SODIUM CHLORIDE 0.9 % (FLUSH) 0.9 %
5-10 SYRINGE (ML) INJECTION AS NEEDED
Status: DISCONTINUED | OUTPATIENT
Start: 2018-11-23 | End: 2018-11-23 | Stop reason: HOSPADM

## 2018-11-23 RX ORDER — LIDOCAINE HYDROCHLORIDE 20 MG/ML
INJECTION, SOLUTION EPIDURAL; INFILTRATION; INTRACAUDAL; PERINEURAL AS NEEDED
Status: DISCONTINUED | OUTPATIENT
Start: 2018-11-23 | End: 2018-11-23 | Stop reason: HOSPADM

## 2018-11-23 RX ORDER — SPIRONOLACTONE 50 MG/1
50 TABLET, FILM COATED ORAL DAILY
COMMUNITY
End: 2018-12-04 | Stop reason: SDUPTHER

## 2018-11-23 RX ORDER — SODIUM CHLORIDE 0.9 % (FLUSH) 0.9 %
5-10 SYRINGE (ML) INJECTION EVERY 8 HOURS
Status: DISCONTINUED | OUTPATIENT
Start: 2018-11-23 | End: 2018-11-23 | Stop reason: HOSPADM

## 2018-11-23 RX ORDER — EPINEPHRINE 0.1 MG/ML
1 INJECTION INTRACARDIAC; INTRAVENOUS
Status: DISCONTINUED | OUTPATIENT
Start: 2018-11-23 | End: 2018-11-23 | Stop reason: HOSPADM

## 2018-11-23 RX ORDER — SODIUM CHLORIDE 9 MG/ML
INJECTION, SOLUTION INTRAVENOUS
Status: DISCONTINUED | OUTPATIENT
Start: 2018-11-23 | End: 2018-11-23 | Stop reason: HOSPADM

## 2018-11-23 RX ORDER — PROPOFOL 10 MG/ML
INJECTION, EMULSION INTRAVENOUS AS NEEDED
Status: DISCONTINUED | OUTPATIENT
Start: 2018-11-23 | End: 2018-11-23 | Stop reason: HOSPADM

## 2018-11-23 RX ADMIN — LIDOCAINE HYDROCHLORIDE 100 MG: 20 INJECTION, SOLUTION EPIDURAL; INFILTRATION; INTRACAUDAL; PERINEURAL at 07:41

## 2018-11-23 RX ADMIN — SODIUM CHLORIDE: 9 INJECTION, SOLUTION INTRAVENOUS at 07:15

## 2018-11-23 RX ADMIN — PROPOFOL 25 MG: 10 INJECTION, EMULSION INTRAVENOUS at 07:50

## 2018-11-23 RX ADMIN — PROPOFOL 50 MG: 10 INJECTION, EMULSION INTRAVENOUS at 07:46

## 2018-11-23 RX ADMIN — PROPOFOL 50 MG: 10 INJECTION, EMULSION INTRAVENOUS at 07:47

## 2018-11-23 RX ADMIN — PROPOFOL 50 MG: 10 INJECTION, EMULSION INTRAVENOUS at 07:48

## 2018-11-23 RX ADMIN — PROPOFOL 50 MG: 10 INJECTION, EMULSION INTRAVENOUS at 07:44

## 2018-11-23 RX ADMIN — PROPOFOL 50 MG: 10 INJECTION, EMULSION INTRAVENOUS at 07:43

## 2018-11-23 RX ADMIN — PROPOFOL 100 MG: 10 INJECTION, EMULSION INTRAVENOUS at 07:41

## 2018-11-23 NOTE — ANESTHESIA PREPROCEDURE EVALUATION
Anesthetic History No history of anesthetic complications Review of Systems / Medical History Patient summary reviewed, nursing notes reviewed and pertinent labs reviewed Pulmonary Smoker Neuro/Psych Within defined limits Cardiovascular Within defined limits Hypertension GI/Hepatic/Renal 
Within defined limits Liver disease Endo/Other Within defined limits Diabetes Obesity Other Findings Physical Exam 
 
Airway Mallampati: II 
TM Distance: > 6 cm Neck ROM: normal range of motion Mouth opening: Normal 
 
 Cardiovascular Regular rate and rhythm,  S1 and S2 normal,  no murmur, click, rub, or gallop Dental 
 
Dentition: Poor dentition Pulmonary Breath sounds clear to auscultation Abdominal 
GI exam deferred Other Findings Anesthetic Plan ASA: 3 Anesthesia type: MAC Induction: Intravenous Anesthetic plan and risks discussed with: Patient

## 2018-11-23 NOTE — PERIOP NOTES

## 2018-11-23 NOTE — DISCHARGE INSTRUCTIONS
3340 [facility], [place], [address], [place] [place], [facility]       Oli Simon, DILAN Colin, MAXIMUS Landeros, [facility]   DILAN Irene NP [address] [facility] [address]    at [place] [address] [facility] [address] [facility] [address], [address] Gala Daniels  22.    [phone]    FAX: [facility]    [address] [facility] [address], [address]    [phone]    FAX: [phone]         ENDOSCOPY DISCHARGE [facility]  1975  Date: 11/23/2018    DISCOMFORT:  Use lozenges or warm salt water gargle for sore thoat  Apply warm compress to IV site if red. If redness or soreness persists call the office. You may experience gas and bloating. Walking and belching will help relieve this. You may experience chest discomfort or pressure especially if banding of esophageal varices was performed. DIET:  You may resume your normal diet. ACTIVITY:  Spend the remainder of the day resting. Avoid any strenuous activity. You may not operate a vehicle for 12 hours. You may not engage in an occupation involving machinery or appliances for rest of today. Avoid making any critical or financial decisions for at least 24 hour. Call the [address] [facility] if you have any of the following:  Increasing chest or abdominal pain, nausea, vomiting, vomiting blood, abdominal distension or swelling, fever or chills, bloody discharge from nose or mouth or shortness of breath. Follow-up Instructions:  Call Dr. Luz Elena Mooney for any questions or problems at the phone number listed above. If a biopsy was performed, you will be contacted by the office staff or Dr Luz Elena Mooney within 1 week.    If you have not heard from us by then you may call the office at the phone number listed above to inquire about the results. ENDOSCOPY FINDINGS:  Your endoscopy demonstrated mild irritation in the stomach. A biopsy of the stomach was taken. DISCHARGE SUMMARY from the Nurse:   The following personal items collected during your admission are returned to you:   Dental Appliance: Dental Appliances: None  Vision: Visual Aid: Glasses  Hearing Aid:    Jewelry:    Clothing:    Other Valuables:    Valuables sent to safe:

## 2018-11-23 NOTE — PROCEDURES
3340 [facility], MD, [address], [address] [facility] [place]       DILAN Wheatley PA-C Darrol Gentry, ACNP-DILAN Muhammad NP [address] [facility] 136    at [address] [facility] [address]    [address] [place] [address], [address] Gala Daniels  22.    [phone]    FAX: [address] [place] [address] Avenue    [address], [address] [facility], [address] [facility] [address]    [phone]    FAX: [phone]         UPPER ENDOSCOPY PROCEDURE NOTE    Sanam See  1975    INDICATION: Cirrhosis. Screening for esophageal varices with variceal ligation if  indicated. : Janene Hendrickson MD    ANESTHESIA/SEDATION: Propofol was administered by anesthesia    PROCEDURE DESCRIPTION:  [employer] consent was obtained from the patient for the procedure. All risks and benefits of the procedure explained. The patient was taken to the endoscopy suite and placed in the left lateral decubitus position. Following sequential administration of sedation to doses as indicated above the endoscope was inserted into the mouth and advanced under direct vision to the second portion of the duodenum. Careful inspection of upper gastrointestinal tract was made as the endoscope was inserted and withdrawn. Retroflexion of the endoscope to view of the cardia of the stomach was performed. The findings and interventions are described below. FINDINGS:  Esophagus:    Normal    Stomach:   Mild portal hypertensive gastropathy of the   body of the stomach  Gastritis of the antrum  No gastric varices identified. Duodenum:   Normal bulb and second portion    INTERVENTION:   2 biopsies were taken from the antrum. The specimens were placed in preservative and transported to Pathology after the procedure. COMPLICATIONS: None.   The patient tolerated the procedure well. EBL: Negligible. RECOMMENDATIONS:  Observe until discharge parameters are achieved. May resume previous diet. Repeat endoscopy to reassess varices and need for banding in 1 year. Follow-up Liver [facility] [place] office as scheduled.       MD Kimo Rubio 13 of [address] [place] [address] A, [address] [place] [address] 22.  [phone]  [address] [place] [address]

## 2018-11-23 NOTE — H&P
[address] [facility] Kenna Malone MD, DEEPALI Holcomb NP Lattie Moloney, PA-C Serena Corin, ELOISE-DILAN Bray NP [address] [facility] [address] 
  at [facility] 
  [address] [facility], [address] G[address]  22. 
  [phone] FAX: [address] 
  [facility] 
  [address], [address] 98 [facility] Mikaela Sun, [address] 
  [phone] FAX: [phone] PRE-PROCEDURE NOTE - EGD H and P from last office visit reviewed. Allergies reviewed. Out-patient medicaton list reviewed. Patient Active Problem List  
Diagnosis Code  Psoriasis L40.9  Obesity (BMI 30.0-34. 9) E66.9  
 HTN (hypertension) I10  
 Incarcerated umbilical hernia Q85.5  Cirrhosis ([address] K74.60  Ascites R18.8  S/P laparoscopy [id number] No Known Allergies No current facility-administered medications on file prior to encounter. Current Outpatient Medications on File Prior to Encounter Medication Sig Dispense Refill  spironolactone (ALDACTONE) 50 mg tablet Take 50 mg by mouth daily.  nebivolol (BYSTOLIC) 10 mg tablet Take 1 Tab by mouth daily. 30 Tab 1  
 mupirocin (BACTROBAN) 2 % ointment Apply  to affected area daily. 22 g 0  
 amLODIPine (NORVASC) 5 mg tablet TAKE 1 TABLET BY MOUTH DAILY. 90 Tab 1  
 furosemide (LASIX) 20 mg tablet Take 1 Tab by mouth daily. 90 Tab 3 For EGD to assess for esophageal and gastric varices. Plan to perform banding if indicated based upon variceal size and appearance. The risks of the procedure were discussed with the patient. These included reaction to anesthesia, pain, perforation and bleeding. All questions were answered. The patient wishes to proceed with the procedure.  
 
PHYSICAL EXAMINATION: 
 VS: per nursing note General: No acute distress. Eyes: Sclera anicteric. ENT: No oral lesions. Thyroid normal. 
Nodes: No adenopathy. Skin: No spider angiomata. No jaundice. No palmar erythema. Respiratory: Lungs clear to auscultation. Cardiovascular: Regular heart rate. No murmurs. No JVD. Abdomen: Soft non-tender, liver size normal to percussion/palpation. Spleen not palpable. No obvious ascites. Extremities: No edema. No muscle wasting. No gross arthritic changes. Neurologic: Alert and oriented. Cranial nerves grossly intact. No asterixis. MOST RECENT LABORATORY STUDIES: 
Lab Results Component Value Date/Time WBC 10.0 06/22/2018 12:00 AM  
 HGB 15.3 06/22/2018 12:00 AM  
 HCT 43.4 06/22/2018 12:00 AM  
 PLATELET 966 32/68/2604 12:00 AM  
 MCV 92 06/22/2018 12:00 AM  
 
Lab Results Component Value Date/Time INR 1.0 06/22/2018 12:00 AM  
 INR 1.1 03/19/2018 12:00 AM  
 INR CANCELED 03/02/2018 12:00 AM  
 Prothrombin time 11.0 06/22/2018 12:00 AM  
 Prothrombin time 11.4 03/19/2018 12:00 AM  
 Prothrombin time CANCELED 03/02/2018 12:00 AM  
 
 
ASSESSMENT AND PLAN: 
EGD to assess for esophageal and/or gastric varices. Conscious sedation with fentanyl and versed. MD Olivia Peres [address] [place] [address], [id number] [facility] 22. 
[phone] [address] [facility]

## 2018-11-23 NOTE — PROGRESS NOTES
Dede Ket 1975 
[id number] Situation: 
Verbal report received from: [facility] Procedure: Procedure(s): ESOPHAGOGASTRODUODENOSCOPY (EGD) ESOPHAGOGASTRODUODENAL (EGD) BIOPSY Background: 
 
Preoperative diagnosis: CIRRHOSIS Postoperative diagnosis: 1. Portal Gastropathy 2. Gastritis :  Luly Orellana Assistant(s): Endoscopy Technician-1: Micki Riley 
Endoscopy RN-1: Berna Bowen Specimens:  
ID Type Source Tests Collected by Time Destination 1 : antrum bx Preservative Gastric  Loretta Curling, MD 11/23/2018 8[address] Path[address] H. Pylori  no Assessment: 
Intra-procedure medications Anesthesia gave intra-procedure sedation and medications, see anesthesia flow sheet yes Intravenous fluids: NS@ Jurline Evener Vital signs stable Abdominal assessment: round and soft Recommendation: 
Discharge patient per MD order. Return to floor Family or Friend Permission to share finding with family or friend yes

## 2018-11-25 NOTE — ANESTHESIA POSTPROCEDURE EVALUATION
Procedure(s): ESOPHAGOGASTRODUODENOSCOPY (EGD) ESOPHAGOGASTRODUODENAL (EGD) BIOPSY. Anesthesia Post Evaluation Multimodal analgesia: multimodal analgesia used between 6 hours prior to anesthesia start to PACU discharge Patient location during evaluation: PACU Patient participation: complete - patient participated Level of consciousness: awake Pain score: 0 Pain management: adequate Airway patency: patent Anesthetic complications: no 
Cardiovascular status: acceptable Respiratory status: acceptable Hydration status: acceptable Comments: I have evaluated the patient and meets criteria for discharge from PACU. Dalton Jeronimo MD 
 
 
 
Visit Vitals /78 Pulse (!) 0 Temp 36.7 °C (98 °F) Resp (!) 0 Ht 5' 9\" (1.753 m) Wt 100.7 kg (222 lb) SpO2 96% BMI 32.78 kg/m²

## 2018-12-04 ENCOUNTER — HOSPITAL ENCOUNTER (OUTPATIENT)
Dept: ULTRASOUND IMAGING | Age: 43
Discharge: HOME | End: 2018-12-04
Attending: INTERNAL MEDICINE
Payer: COMMERCIAL

## 2018-12-04 ENCOUNTER — OFFICE VISIT (OUTPATIENT)
Dept: HEMATOLOGY | Age: 43
End: 2018-12-04

## 2018-12-04 VITALS
OXYGEN SATURATION: 98 % | TEMPERATURE: 98.2 F | DIASTOLIC BLOOD PRESSURE: 84 MMHG | BODY MASS INDEX: 33.24 KG/M2 | HEIGHT: 69 IN | HEART RATE: 86 BPM | WEIGHT: 224.4 LBS | SYSTOLIC BLOOD PRESSURE: 129 MMHG

## 2018-12-04 DIAGNOSIS — K74.60 CIRRHOSIS OF LIVER WITH ASCITES, UNSPECIFIED HEPATIC CIRRHOSIS TYPE (HCC): Primary | ICD-10-CM

## 2018-12-04 DIAGNOSIS — R18.8 CIRRHOSIS OF LIVER WITH ASCITES, UNSPECIFIED HEPATIC CIRRHOSIS TYPE (HCC): Primary | ICD-10-CM

## 2018-12-04 DIAGNOSIS — K74.60 CIRRHOSIS OF LIVER WITH ASCITES, UNSPECIFIED HEPATIC CIRRHOSIS TYPE (HCC): ICD-10-CM

## 2018-12-04 DIAGNOSIS — R18.8 CIRRHOSIS OF LIVER WITH ASCITES, UNSPECIFIED HEPATIC CIRRHOSIS TYPE (HCC): ICD-10-CM

## 2018-12-04 PROCEDURE — 76705 ECHO EXAM OF ABDOMEN: CPT

## 2018-12-04 RX ORDER — FUROSEMIDE 20 MG/1
20 TABLET ORAL DAILY
Qty: 90 TAB | Refills: 3 | Status: SHIPPED | OUTPATIENT
Start: 2018-12-04 | End: 2019-04-17

## 2018-12-04 RX ORDER — SPIRONOLACTONE 50 MG/1
50 TABLET, FILM COATED ORAL DAILY
Qty: 90 TAB | Refills: 4 | Status: SHIPPED | OUTPATIENT
Start: 2018-12-04 | End: 2019-04-17

## 2018-12-04 NOTE — PROGRESS NOTES
[address] [facility] Alejandra Dia MD, Rachel Grider, [facility] Pinto, DILAN Bailey, MAXIMUS Dorman, [facility]   Raul Storm, DILAN Hackett, DILAN Gentile [facility] 136 
  at [facility] AT [address], [facility] Gala Daniels  22. [phone] FAX: [address] 
  [facility] 
  [address], [address] [place] [address], [address] 
  [phone] FAX: [phone] Patient Care Team: 
Nuvia Santillan MD as PCP - General (Internal Medicine) Gino Bishop MD (Surgery) Problem List  Date Reviewed: 11/23/2018 Codes Class Noted S/P laparoscopy ICD-10-CM: I53.095 ICD-9-CM: V45.89  12/4/2017 Overview Signed 12/4/2017 10:19 AM by Gino Bishop MD  
  Diagnostic, core needle biopsy of the liver. Cirrhosis ([facility] [address] ICD-10-CM: K74.60 ICD-9-CM: 571.5  11/14/2017 Overview Signed 9/7/2018 10:15 AM by MD Dr. Yuan oRy Ascites ICD-10-CM: R18.8 ICD-9-CM: 789.59  11/14/2017 Obesity (BMI 30.0-34.9) ICD-10-CM: N69.0 ICD-9-CM: 278.00  11/6/2017 HTN (hypertension) ICD-10-CM: I10 
ICD-9-CM: 401.9  11/6/2017 Incarcerated umbilical hernia KFB-08-FD: K42.0 ICD-9-CM: 552.1  11/6/2017 Overview Signed 11/6/2017 10:19 AM by Gino Bishop MD  
  Without gangrene or obstruction. Psoriasis (Chronic) ICD-10-CM: L40.9 ICD-9-CM: 696.1  10/27/2017 Overview Signed 10/27/2017  2:33 PM by GeorgiannMD Dr. Rodger Funez returns to the [place] [address] for management of cirrhosis of unclear etiology.   The active problem list, all pertinent past medical history, medications, liver histology, radiologic findings and laboratory findings related to the liver disorder were reviewed with the patient. The patient is a 41 y.o. Black male who was found to have chronic liver disease and cirrhosis in 11/20017 when he underwent an attempt at lap umbilical hernia repair. This was aborted after discovering cirrhosis. The patient has developed the following complications of cirrhosis: ascites, Since the last office appointment the patient has : 
ran out of diuretics 1 week ago. He has not had recurrent ascites or edema. The patient notes the umbilical hernia is getting larger and is painful. He would like this surgically repaired if possible. The patient has not experienced fatigue, pain in the right side over the liver, nausea, vomiting, The patient completes all daily activities without any functional limitations. ASSESSMENT AND PLAN: 
Cirrhosis The diagnosis of cirrhosis is based upon liver biopsy The etiology for cirrhosis is unclear. Serologic testing for causes of chronic liver disease were positive for ASMA However the biopsy does not have any features consistent with an autoimmune hepatitis. The patient used to consume up to 6 alcoholic beverages daily. The etiology for cirrhosis may be alcohol. The CTP is 5. Child class A. The MELD score is 9. Ascites Ascites first appeared in 11/2017. This was found at the time of laparoscopic surgery in 11/2017 and was minimal. 
Has resolved with current dose of diuretics. He ran out of diuretics 1 week ago. Since he wants hernia repair will restart diuretics at step 1/2. The patient was counseled regarding the need to maintain sodium restriction and the types of foods containing high amounts of sodium to be avoided. Esophageal varices The patient has esophageal varices without prior bleeding. Varices have treated with banding. The last EGD to assess for varices was performed in 6/2018. Will schedule for EGD to assess for varices and need for banding prior to umbilical hernia repair. Risk of elective surgery in a patient with cirrhosis The patient has cirrhosis Child class A and MELD of under 10. The risk of complications including hepatic decompensation is about 20-30%. Operative mortality risk is under 5%. The ultimate decision regarding whether or not to proceed with surgery will depend upon conversations between the surgeon and patient/and/or family and careful assessment of the risk and benefit of the surgical procedure. Hepatic encephalopathy This has not developed to date. There is no need for treatment with lactulose and/or Xifaxan at this time. No need to restrict dietary protein at this time. Screening for Hepatocellular Carcinoma [facility] [address] screening has recently been performed and does not suggest [address] The next liver imaging study will be performed in 10/2018. Treatment of other medical problems in patients with chronic liver disease There are no contraindications for the patient to take any medications that are necessary for treatment of other medical issues. The patient has cirrhosis. Patients with cirrhosis should not use NSAIDs if possible as this is associated with a higher rate of LEXIE. Counseling for alcohol in patients with chronic liver disease The patient has cirrhosis and was advised to be abstinent from all alcohol including non-alcoholic beer which does contain some alcohol. The patient has not had alcohol since the last appointment in 6/2018. Osteoporosis The risk of osteoporosis is increased in patients with cirrhosis. DEXA bone density to assess for osteoporosis has not been performed. Vaccinations Vaccination for viral hepatitis A and B is recommended since the patient has no serologic evidence of previous exposure or vaccination with immunity. Routine vaccinations against other bacterial and viral agents can be performed as indicated. Annual flu vaccination should be administered if indicated. ALLERGIES No Known Allergies MEDICATIONS Current Outpatient Medications Medication Sig  
 spironolactone (ALDACTONE) 50 mg tablet Take 50 mg by mouth daily.  nebivolol (BYSTOLIC) 10 mg tablet Take 1 Tab by mouth daily.  amLODIPine (NORVASC) 5 mg tablet TAKE 1 TABLET BY MOUTH DAILY.  furosemide (LASIX) 20 mg tablet Take 1 Tab by mouth daily.  mupirocin (BACTROBAN) 2 % ointment Apply  to affected area daily. No current facility-administered medications for this visit. SYSTEM REVIEW NOT RELATED TO LIVER DISEASE OR REVIEWED ABOVE: 
Constitution systems: Negative for fever, chills, weight gain, weight loss. Eyes: Negative for visual changes. ENT: Negative for sore throat, painful swallowing. Respiratory: Negative for cough, hemoptysis, SOB. Cardiology: Negative for chest pain, palpitations. GI:  Negative for constipation or diarrhea. : Negative for urinary frequency, dysuria, hematuria, nocturia. Skin: Negative for rash. Hematology: Negative for easy bruising, blood clots. Musculo-skeletal: Negative for back pain, muscle pain, weakness. Neurologic: Negative for headaches, dizziness, vertigo, memory problems not related to HE. Psychology: Negative for anxiety, depression. FAMILY HISTORY: 
The father  of a heart attack. The mother  of cancer. There is no family history of liver disease. SOCIAL HISTORY: 
The patient has never been . The patient has no children. The patient smokes cigars on the weekends. The patient drinks about 2 beers a night. The patient currently works full time at [employer] as a . PHYSICAL EXAMINATION: 
Visit Vitals /84 (BP 1 Location: Right arm, BP Patient Position: Sitting) Pulse 86 Temp 98.2 °F (36.8 °C) (Tympanic) Ht 5' 9\" (1.753 m) Wt 224 lb 6.4 oz (101.8 kg) SpO2 98% BMI 33.14 kg/m² General: No acute distress. Eyes: Sclera anicteric. ENT: No oral lesions. Nodes: No adenopathy. Skin: No spider angiomata. No jaundice. No palmar erythema. Respiratory: Lungs clear to auscultation. Cardiovascular: Regular heart rate. No murmurs. No JVD. Abdomen: Soft non-tender. Liver size normal to percussion/palpation. Spleen not palpable. No obvious ascites. Large umbilical hernia Extremities: No edema. No muscle wasting. No gross arthritic changes. Neurologic: Alert and oriented. Cranial nerves grossly intact. No asterixis. LABORATORY STUDIES: 
Liver [place] [address] Units 6/22/2018 4/11/2018 WBC 3.4 - 10.8 x10E3/uL 10.0 10.0 ANC 1.4 - 7.0 x10E3/uL 5.1 HGB 13.0 - 17.7 g/dL 15.3 16.7  - 379 x10E3/uL 255 246 INR 0.8 - 1.2 1.0 AST 0 - 40 IU/L 56 (H) 76 (H) ALT 0 - 44 IU/L 46 (H) 49 (H) Alk Phos 39 - 117 IU/L 123 (H) 116 Bili, Total 0.0 - 1.2 mg/dL 0.9 1.0 Bili, Direct 0.00 - 0.40 mg/dL 0.33 0.36 Albumin 3.5 - 5.5 g/dL 4.6 4.7 BUN 6 - 24 mg/dL 10 9 Creat 0.76 - 1.27 mg/dL 0.76 0.71 (L) Na 134 - 144 mmol/L 137 140  
K 3.5 - 5.2 mmol/L 4.1 4.3 Cl 96 - 106 mmol/L 98 98 CO2 20 - 29 mmol/L 25 26 Glucose 65 - 99 mg/dL 102 (H) 102 (H) SEROLOGIES: 
Serologies Latest Ref Rng & Units 3/2/2018 Hep A Ab, Total Negative Negative Hep B Surface Ag Negative Negative Hep B Surface AB QL  Non Reactive Hep C Ab 0.0 - 0.9 s/co ratio <0.1 Ferritin 30 - 400 ng/mL 267 Iron % Saturation 15 - 55 % 36 CHRISTI Ab, Direct Negative Negative C-ANCA Neg:<1:20 titer <1:20  
P-ANCA Neg:<1:20 titer <1:20  
ANCA Neg:<1:20 titer <1:20  
ASMCA 0 - 19 Units 27 (H)  
M2 Ab 0.0 - 20.0 Units 12.3 Ceruloplasmin 16.0 - 31.0 mg/dL 33.6 (H) Alpha-1 antitrypsin level 90 - 200 mg/dL 173 LIVER HISTOLOGY: 
 11/2017. Slides reviewed by MLS. Cirrhosis. NO steatosis. Mild focal non-specific inflammation adjacent to broad bands of cirhrosis 4/2018. FibroScan performed at [address] EkPa was 48.0. IQR/med 20%. The results suggested a fibrosis level of F4. CAP is 282, consistent with fatty liver disease. ENDOSCOPIC PROCEDURES: 
6/2018. EGD performed by MLS. Large esophageal varices. Banding performed. No gastric varices. Mild portal gastropathy. RADIOLOGY: 
4/2018. Abdominal ultrasound. Liver is normal. No lesions or masses. OTHER TESTING: 
Not available or performed FOLLOW-UP: 
All of the issues listed above in the Assessment and Plan were discussed with the patient. All questions were answered. The patient expressed a clear understanding of the above. Follow-up Arjun Correa 32 in 1 month umbilical hernia repair. Anticipate this will be early 2/2019. Hilary Lopes MD 
[facility] Olivia Gentile Saroj [address] [place] [address], [address] Gala Werner  22. 
[phone]

## 2018-12-05 LAB
AFP L3 MFR SERPL: 6.1 % (ref 0–9.9)
AFP SERPL-MCNC: 7.1 NG/ML (ref 0–8)
ALBUMIN SERPL-MCNC: 4.6 G/DL (ref 3.5–5.5)
ALP SERPL-CCNC: 125 IU/L (ref 39–117)
ALT SERPL-CCNC: 81 IU/L (ref 0–44)
AST SERPL-CCNC: 103 IU/L (ref 0–40)
BASOPHILS # BLD AUTO: 0 X10E3/UL (ref 0–0.2)
BASOPHILS NFR BLD AUTO: 1 %
BILIRUB DIRECT SERPL-MCNC: 0.31 MG/DL (ref 0–0.4)
BILIRUB SERPL-MCNC: 0.8 MG/DL (ref 0–1.2)
BUN SERPL-MCNC: 6 MG/DL (ref 6–24)
BUN/CREAT SERPL: 8 (ref 9–20)
CALCIUM SERPL-MCNC: 9.5 MG/DL (ref 8.7–10.2)
CHLORIDE SERPL-SCNC: 99 MMOL/L (ref 96–106)
CO2 SERPL-SCNC: 24 MMOL/L (ref 20–29)
CREAT SERPL-MCNC: 0.72 MG/DL (ref 0.76–1.27)
EOSINOPHIL # BLD AUTO: 0.2 X10E3/UL (ref 0–0.4)
EOSINOPHIL NFR BLD AUTO: 2 %
ERYTHROCYTE [DISTWIDTH] IN BLOOD BY AUTOMATED COUNT: 13.2 % (ref 12.3–15.4)
GLUCOSE SERPL-MCNC: 139 MG/DL (ref 65–99)
HCT VFR BLD AUTO: 45.6 % (ref 37.5–51)
HGB BLD-MCNC: 15.7 G/DL (ref 13–17.7)
IMM GRANULOCYTES # BLD: 0 X10E3/UL (ref 0–0.1)
IMM GRANULOCYTES NFR BLD: 0 %
INR PPP: 1.1 (ref 0.8–1.2)
LYMPHOCYTES # BLD AUTO: 3 X10E3/UL (ref 0.7–3.1)
LYMPHOCYTES NFR BLD AUTO: 38 %
MCH RBC QN AUTO: 33.2 PG (ref 26.6–33)
MCHC RBC AUTO-ENTMCNC: 34.4 G/DL (ref 31.5–35.7)
MCV RBC AUTO: 96 FL (ref 79–97)
MONOCYTES # BLD AUTO: 0.6 X10E3/UL (ref 0.1–0.9)
MONOCYTES NFR BLD AUTO: 8 %
NEUTROPHILS # BLD AUTO: 4 X10E3/UL (ref 1.4–7)
NEUTROPHILS NFR BLD AUTO: 51 %
PLATELET # BLD AUTO: 229 X10E3/UL (ref 150–379)
POTASSIUM SERPL-SCNC: 4.6 MMOL/L (ref 3.5–5.2)
PROT SERPL-MCNC: 8.2 G/DL (ref 6–8.5)
PROTHROMBIN TIME: 10.9 SEC (ref 9.1–12)
RBC # BLD AUTO: 4.73 X10E6/UL (ref 4.14–5.8)
SODIUM SERPL-SCNC: 139 MMOL/L (ref 134–144)
WBC # BLD AUTO: 7.9 X10E3/UL (ref 3.4–10.8)

## 2018-12-06 NOTE — PROGRESS NOTES
Informed patient of US results and follow appointment. Patient expressed an understanding and had no further questions.

## 2018-12-20 ENCOUNTER — OFFICE VISIT (OUTPATIENT)
Dept: SURGERY | Age: 43
End: 2018-12-20

## 2018-12-20 VITALS
WEIGHT: 226 LBS | TEMPERATURE: 98 F | HEART RATE: 80 BPM | SYSTOLIC BLOOD PRESSURE: 140 MMHG | BODY MASS INDEX: 33.47 KG/M2 | DIASTOLIC BLOOD PRESSURE: 88 MMHG | RESPIRATION RATE: 20 BRPM | OXYGEN SATURATION: 98 % | HEIGHT: 69 IN

## 2018-12-20 DIAGNOSIS — K42.0 INCARCERATED UMBILICAL HERNIA: Primary | ICD-10-CM

## 2018-12-20 NOTE — PROGRESS NOTES
[address] [facility] Surgery History and Physical    Chief Complaint: incarcerated umbilical hernia    History of Present Illness:      Gurdeep Wick is a 41 y.o. male who was kindly referred by Dr. Law Yañez for evaluation of an umbilical hernia. The patient states he has had the hernia for about 1 year but it has been getting larger. He has some pain associated with the hernia when he is more active. He has not had any strangulation or obstructive symptoms. He cannot get the hernia to fully reduce. He was evaluated for repair of this by Dr. Juli Alaniz but this was aborted as his cirrhosis was noted at the time of surgery. He has subsequently seen Dr. Shira Feliz who has the patient on step 1 diuretics with resolution of any ascites. He is a Child class A, MELD score 9 cirrhotic presumed to be from EtOH use. The patient is still drinking beer, mostly on the weekends but significantly less than prior. He smokes cigars but denies any illicit drug use. He has no known history of hepatitis. Past Medical History:   Diagnosis Date    Diabetes ([address]     no medication    Hypertension     Ill-defined condition     hernia - navel    Liver disease     cirrhosis    Obesity (BMI 30.0-34. 9)     Psoriasis        Past Surgical History:   Procedure Laterality Date    ABDOMEN SURGERY PROC UNLISTED      Diagnostic laparoscopy, core needle biopsy of the liver        Social History     Socioeconomic History    Marital status: SINGLE     Spouse name: Not on file    Number of children: Not on file    Years of education: Not on file    Highest education level: Not on file   Social Needs    Financial resource strain: Not on file    Food insecurity - worry: Not on file    Food insecurity - inability: Not on file    Transportation needs - medical: Not on file   [employer] needs - non-medical: Not on file   Occupational History    Not on file   Tobacco Use    Smoking status: Light Tobacco Smoker     Types: Cigars    Smokeless tobacco: Never Used    Tobacco comment: x1 week - cigar smoker   Substance and Sexual Activity    Alcohol use: No     Frequency: Never     Comment: 24 ounce beer per week/2 beers per week now per pt on 6/7/2018/none now per pt on 11/21/2018    Drug use: No    Sexual activity: Yes     Partners: Female   Other Topics Concern    Not on file   Social History Narrative    Not on file       Family History   Problem Relation Age of Onset    Cancer Mother         Stomach    Diabetes Father     Diabetes Brother     Hypertension Neg Hx          Current Outpatient Medications:     BYSTOLIC 10 mg tablet, TAKE 1 TABLET BY MOUTH EVERY DAY, Disp: 30 Tab, Rfl: 5    spironolactone (ALDACTONE) 50 mg tablet, Take 1 Tab by mouth daily. , Disp: 90 Tab, Rfl: 4    furosemide (LASIX) 20 mg tablet, Take 1 Tab by mouth daily. , Disp: 90 Tab, Rfl: 3    mupirocin (BACTROBAN) 2 % ointment, Apply  to affected area daily. , Disp: 22 g, Rfl: 0    amLODIPine (NORVASC) 5 mg tablet, TAKE 1 TABLET BY MOUTH DAILY. , Disp: 90 Tab, Rfl: 1    No Known Allergies    ROS   Constitutional: negative  Ears, Nose, Mouth, Throat, and Face: negative  Respiratory: negative  Cardiovascular: negative  Gastrointestinal: See HPI  Genitourinary:negative  Integument/Breast: negative  Hematologic/Lymphatic: negative  Behavioral/Psychiatric: negative  Allergic/Immunologic: negative      Physical Exam:     Visit Vitals  /88 (BP 1 Location: Right arm, BP Patient Position: Sitting)   Pulse 80   Temp 98 °F (36.7 °C) (Oral)   Resp 20   Ht 5' 9\" (1.753 m)   Wt 226 lb (102.5 kg)   SpO2 98%   BMI 33.37 kg/m²       General - alert and oriented, no apparent distress  HEENT - NC/AT. No scleral icterus  Pulm - CTAB, normal inspiratory effort  CV - RRR, no M/R/G  Abd - soft, NT, ND. There is a large umbilical hernia with chronic incarceration. This can be partially reduced but not completely. Nontender and without erythema.   No other palpable masses or organomegaly noted. No obvious ascites on exam.   Ext - warm, well perfused, no edema  Lymphatics - no cervical, supraclavicular or inguinal adenopathy noted. Skin - supple, no rashes  Psychiatric - normal affect, good mood    Labs  Results for Bella Herrera (MRN [id number]) as of 12/20/2018 16:52   Ref. Range 12/4/2018 00:00   WBC Latest Ref Range: 3.4 - 10.8 x10E3/uL 7.9   RBC Latest Ref Range: 4.14 - 5.80 x10E6/uL 4.73   HGB Latest Ref Range: 13.0 - 17.7 g/dL 15.7   HCT Latest Ref Range: 37.5 - 51.0 % 45.6   MCV Latest Ref Range: 79 - 97 fL 96   MCH Latest Ref Range: 26.6 - 33.0 pg 33.2 (H)   MCHC Latest Ref Range: 31.5 - 35.7 g/dL 34.4   RDW Latest Ref Range: 12.3 - 15.4 % 13.2   PLATELET Latest Ref Range: 150 - 379 x10E3/uL 229   Results for Bella Herrera (MRN [id number]) as of 12/20/2018 16:52   Ref. Range 12/4/2018 00:00   INR Latest Ref Range: 0.8 - 1.2  1.1   Prothrombin time Latest Ref Range: 9.1 - 12.0 sec 10.9     Results for Bella Herrera (MRN [id number]) as of 12/20/2018 16:52   Ref. Range 12/4/2018 00:00   Sodium Latest Ref Range: 134 - 144 mmol/L 139   Potassium Latest Ref Range: 3.5 - 5.2 mmol/L 4.6   Chloride Latest Ref Range: 96 - 106 mmol/L 99   CO2 Latest Ref Range: 20 - 29 mmol/L 24   Glucose Latest Ref Range: 65 - 99 mg/dL 139 (H)   BUN Latest Ref Range: 6 - 24 mg/dL 6   Creatinine Latest Ref Range: 0.76 - 1.27 mg/dL 0.72 (L)   BUN/Creatinine ratio Latest Ref Range: 9 - 20  8 (L)   Calcium Latest Ref Range: 8.7 - 10.2 mg/dL 9.5   GFR est non-AA Latest Ref Range: >59 mL/min/1.73 116   GFR est AA Latest Ref Range: >59 mL/min/1.73 134   Bilirubin, total Latest Ref Range: 0.0 - 1.2 mg/dL 0.8   Bilirubin, direct Latest Ref Range: 0.00 - 0.40 mg/dL 0.31   Protein, total Latest Ref Range: 6.0 - 8.5 g/dL 8.2   Albumin Latest Ref Range: 3.5 - 5.5 g/dL 4.6   ALT (SGPT) Latest Ref Range: 0 - 44 IU/L 81 (H)   AST Latest Ref Range: 0 - 40 IU/L 103 (H)   Alk.  phosphatase Latest Ref Range: 39 - 117 IU/L 125 (H)     Imaging  12/4/18 Abd U/S: IMPRESSION:  1. Cirrhotic liver with trace ascites but no concerning liver lesion. 2.  Patent hepatic vasculature. I have reviewed and agree with all of the pertinent images    Assessment:     Jose Benavidez is a 41 y.o. male with an incarcerated umbilical hernia without strangulation or obstruction in the setting of cirrhosis. He has medically managed ascites. Recommendations:     1. I have discussed the increased risk of surgery in the setting of cirrhosis including risk of liver decompensation, bleeding and including death. He wishes to proceed with repair. I will plan to set him up for laparoscopic umbilical hernia repair with mesh. He will likely required some removal of skin at the hernia site due to the chronic nature of this large hernia. I discussed use of mesh and possible need to convert to open. A complete discussion of the risks, benefits and alternatives to surgery were discussed with the patient who was keen to proceed. 30 mins of time was spent with the patient of which > 50% of the time involved face-to-face counseling of the patient regarding the proposed treatment plan.       Thai Vital MD  12/20/2018    CC: MD Dr. Eddie Ramsay

## 2018-12-20 NOTE — PROGRESS NOTES
1. Have you been to the ER, urgent care clinic since your last visit? Hospitalized since your last visit? No    2. Have you seen or consulted any other health care providers outside of the [address] [place] [address] since your last visit? Include any pap smears or colon screening.  No

## 2019-01-04 ENCOUNTER — HOSPITAL ENCOUNTER (OUTPATIENT)
Dept: PREADMISSION TESTING | Age: 44
Discharge: HOME | End: 2019-01-04
Payer: COMMERCIAL

## 2019-01-04 ENCOUNTER — HOSPITAL ENCOUNTER (OUTPATIENT)
Dept: GENERAL RADIOLOGY | Age: 44
Discharge: HOME | End: 2019-01-04
Payer: COMMERCIAL

## 2019-01-04 VITALS
HEIGHT: 69 IN | RESPIRATION RATE: 20 BRPM | DIASTOLIC BLOOD PRESSURE: 89 MMHG | TEMPERATURE: 98 F | SYSTOLIC BLOOD PRESSURE: 150 MMHG | HEART RATE: 89 BPM | WEIGHT: 223.5 LBS | BODY MASS INDEX: 33.1 KG/M2

## 2019-01-04 DIAGNOSIS — Z01.818 PRE-OP TESTING: ICD-10-CM

## 2019-01-04 LAB
ABO + RH BLD: NORMAL
ALBUMIN SERPL-MCNC: 4.1 G/DL (ref 3.5–5)
ALBUMIN/GLOB SERPL: 0.9 {RATIO} (ref 1.1–2.2)
ALP SERPL-CCNC: 130 U/L (ref 45–117)
ALT SERPL-CCNC: 100 U/L (ref 12–78)
ANION GAP SERPL CALC-SCNC: 6 MMOL/L (ref 5–15)
APTT PPP: 34.5 SEC (ref 22.1–32)
AST SERPL-CCNC: 124 U/L (ref 15–37)
ATRIAL RATE: 77 BPM
BILIRUB SERPL-MCNC: 1.4 MG/DL (ref 0.2–1)
BLOOD GROUP ANTIBODIES SERPL: NORMAL
BUN SERPL-MCNC: 9 MG/DL (ref 6–20)
BUN/CREAT SERPL: 12 (ref 12–20)
CALCIUM SERPL-MCNC: 8.9 MG/DL (ref 8.5–10.1)
CALCULATED P AXIS, ECG09: 41 DEGREES
CALCULATED R AXIS, ECG10: 16 DEGREES
CALCULATED T AXIS, ECG11: 38 DEGREES
CHLORIDE SERPL-SCNC: 103 MMOL/L (ref 97–108)
CO2 SERPL-SCNC: 26 MMOL/L (ref 21–32)
CREAT SERPL-MCNC: 0.76 MG/DL (ref 0.7–1.3)
DIAGNOSIS, 93000: NORMAL
ERYTHROCYTE [DISTWIDTH] IN BLOOD BY AUTOMATED COUNT: 11.8 % (ref 11.5–14.5)
EST. AVERAGE GLUCOSE BLD GHB EST-MCNC: 151 MG/DL
GLOBULIN SER CALC-MCNC: 4.5 G/DL (ref 2–4)
GLUCOSE SERPL-MCNC: 138 MG/DL (ref 65–100)
HBA1C MFR BLD: 6.9 % (ref 4.2–6.3)
HCT VFR BLD AUTO: 46.4 % (ref 36.6–50.3)
HGB BLD-MCNC: 15.8 G/DL (ref 12.1–17)
INR PPP: 1.1 (ref 0.9–1.1)
MCH RBC QN AUTO: 32.4 PG (ref 26–34)
MCHC RBC AUTO-ENTMCNC: 34.1 G/DL (ref 30–36.5)
MCV RBC AUTO: 95.1 FL (ref 80–99)
NRBC # BLD: 0 K/UL (ref 0–0.01)
NRBC BLD-RTO: 0 PER 100 WBC
P-R INTERVAL, ECG05: 164 MS
PLATELET # BLD AUTO: 178 K/UL (ref 150–400)
PMV BLD AUTO: 11.3 FL (ref 8.9–12.9)
POTASSIUM SERPL-SCNC: 4.1 MMOL/L (ref 3.5–5.1)
PROT SERPL-MCNC: 8.6 G/DL (ref 6.4–8.2)
PROTHROMBIN TIME: 11 SEC (ref 9–11.1)
Q-T INTERVAL, ECG07: 378 MS
QRS DURATION, ECG06: 84 MS
QTC CALCULATION (BEZET), ECG08: 427 MS
RBC # BLD AUTO: 4.88 M/UL (ref 4.1–5.7)
SODIUM SERPL-SCNC: 135 MMOL/L (ref 136–145)
SPECIMEN EXP DATE BLD: NORMAL
THERAPEUTIC RANGE,PTTT: ABNORMAL SECS (ref 58–77)
VENTRICULAR RATE, ECG03: 77 BPM
WBC # BLD AUTO: 7.6 K/UL (ref 4.1–11.1)

## 2019-01-04 PROCEDURE — 71046 X-RAY EXAM CHEST 2 VIEWS: CPT

## 2019-01-04 PROCEDURE — 85610 PROTHROMBIN TIME: CPT

## 2019-01-04 PROCEDURE — 93005 ELECTROCARDIOGRAM TRACING: CPT

## 2019-01-04 PROCEDURE — 80053 COMPREHEN METABOLIC PANEL: CPT

## 2019-01-04 PROCEDURE — 36415 COLL VENOUS BLD VENIPUNCTURE: CPT

## 2019-01-04 PROCEDURE — 83036 HEMOGLOBIN GLYCOSYLATED A1C: CPT

## 2019-01-04 PROCEDURE — 85730 THROMBOPLASTIN TIME PARTIAL: CPT

## 2019-01-04 PROCEDURE — 85027 COMPLETE CBC AUTOMATED: CPT

## 2019-01-04 PROCEDURE — 86900 BLOOD TYPING SEROLOGIC ABO: CPT

## 2019-01-07 ENCOUNTER — DOCUMENTATION ONLY (OUTPATIENT)
Dept: SURGERY | Age: 44
End: 2019-01-07

## 2019-01-07 NOTE — PERIOP NOTES
MESSAGE SENT THROUGH [facility] TO MARYSE WELLS'S NURSE FOR ABNORMAL PTT AND CMP.

## 2019-01-07 NOTE — PROGRESS NOTES
Received message from Navi Georges with PAT regarding patients CMP and PTT results. I have forwarded the same to Dr. Conchita Rehman for review.

## 2019-01-09 ENCOUNTER — DOCUMENTATION ONLY (OUTPATIENT)
Dept: SURGERY | Age: 44
End: 2019-01-09

## 2019-01-09 NOTE — PROGRESS NOTES
[facility] paperwork completed and faxed to [facility] Management [phone].

## 2019-01-10 ENCOUNTER — TELEPHONE (OUTPATIENT)
Dept: SURGERY | Age: 44
End: 2019-01-10

## 2019-01-10 NOTE — TELEPHONE ENCOUNTER
HIPPA verified. Patient wanted to know if his STD paperwork had been completed. Explained to patient it had been completed and faxed back. Advised him to call [employer] and confirm receipt. Patient expressed understanding.

## 2019-01-11 ENCOUNTER — HOSPITAL ENCOUNTER (OUTPATIENT)
Age: 44
Setting detail: OBSERVATION
Discharge: HOME | End: 2019-01-12
Attending: SURGERY | Admitting: SURGERY
Payer: COMMERCIAL

## 2019-01-11 ENCOUNTER — ANESTHESIA (OUTPATIENT)
Dept: SURGERY | Age: 44
End: 2019-01-11
Payer: COMMERCIAL

## 2019-01-11 ENCOUNTER — ANESTHESIA EVENT (OUTPATIENT)
Dept: SURGERY | Age: 44
End: 2019-01-11
Payer: COMMERCIAL

## 2019-01-11 ENCOUNTER — TELEPHONE (OUTPATIENT)
Dept: SURGERY | Age: 44
End: 2019-01-11

## 2019-01-11 DIAGNOSIS — K42.9 UMBILICAL HERNIA WITHOUT OBSTRUCTION AND WITHOUT GANGRENE: Primary | ICD-10-CM

## 2019-01-11 PROBLEM — K43.9 VENTRAL HERNIA: Status: ACTIVE | Noted: 2019-01-11

## 2019-01-11 LAB
GLUCOSE BLD STRIP.AUTO-MCNC: 120 MG/DL (ref 65–100)
GLUCOSE BLD STRIP.AUTO-MCNC: 137 MG/DL (ref 65–100)
SERVICE CMNT-IMP: ABNORMAL
SERVICE CMNT-IMP: ABNORMAL

## 2019-01-11 PROCEDURE — 76010000153 HC OR TIME 1.5 TO 2 HR: Performed by: SURGERY

## 2019-01-11 PROCEDURE — 82962 GLUCOSE BLOOD TEST: CPT

## 2019-01-11 PROCEDURE — 77030002916 HC SUT ETHLN J&J -A: Performed by: SURGERY

## 2019-01-11 PROCEDURE — 77030013079 HC BLNKT BAIR HGGR 3M -A: Performed by: NURSE ANESTHETIST, CERTIFIED REGISTERED

## 2019-01-11 PROCEDURE — 74011250636 HC RX REV CODE- 250/636: Performed by: SURGERY

## 2019-01-11 PROCEDURE — 77030008684 HC TU ET CUF COVD -B: Performed by: NURSE ANESTHETIST, CERTIFIED REGISTERED

## 2019-01-11 PROCEDURE — 77030011640 HC PAD GRND REM COVD -A: Performed by: SURGERY

## 2019-01-11 PROCEDURE — 77030002986 HC SUT PROL J&J -A: Performed by: SURGERY

## 2019-01-11 PROCEDURE — 74011250636 HC RX REV CODE- 250/636

## 2019-01-11 PROCEDURE — 77030031139 HC SUT VCRL2 J&J -A: Performed by: SURGERY

## 2019-01-11 PROCEDURE — 77030035045 HC TRCR ENDOSC VRSPRT BLDLSS COVD -B: Performed by: SURGERY

## 2019-01-11 PROCEDURE — 76210000016 HC OR PH I REC 1 TO 1.5 HR: Performed by: SURGERY

## 2019-01-11 PROCEDURE — 77030012893

## 2019-01-11 PROCEDURE — 77030026438 HC STYL ET INTUB CARD -A: Performed by: NURSE ANESTHETIST, CERTIFIED REGISTERED

## 2019-01-11 PROCEDURE — 74011000250 HC RX REV CODE- 250: Performed by: SURGERY

## 2019-01-11 PROCEDURE — C1781 MESH (IMPLANTABLE): HCPCS | Performed by: SURGERY

## 2019-01-11 PROCEDURE — 74011250636 HC RX REV CODE- 250/636: Performed by: ANESTHESIOLOGY

## 2019-01-11 PROCEDURE — 77030002966 HC SUT PDS J&J -A: Performed by: SURGERY

## 2019-01-11 PROCEDURE — 99218 HC RM OBSERVATION: CPT

## 2019-01-11 PROCEDURE — 88302 TISSUE EXAM BY PATHOLOGIST: CPT

## 2019-01-11 PROCEDURE — 77030020782 HC GWN BAIR PAWS FLX 3M -B

## 2019-01-11 PROCEDURE — 77030022704 HC SUT VLOC COVD -B: Performed by: SURGERY

## 2019-01-11 PROCEDURE — 77030035051: Performed by: SURGERY

## 2019-01-11 PROCEDURE — 77030032490 HC SLV COMPR SCD KNE COVD -B: Performed by: SURGERY

## 2019-01-11 PROCEDURE — 77030025927 HC STPLR FIX SECURSTRP J&J -F: Performed by: SURGERY

## 2019-01-11 PROCEDURE — 74011000250 HC RX REV CODE- 250

## 2019-01-11 PROCEDURE — 74011250637 HC RX REV CODE- 250/637: Performed by: SURGERY

## 2019-01-11 PROCEDURE — 77030018836 HC SOL IRR NACL ICUM -A: Performed by: SURGERY

## 2019-01-11 PROCEDURE — 77030037032 HC INSRT SCIS CLICKLLINE DISP STOR -B: Performed by: SURGERY

## 2019-01-11 PROCEDURE — 77030035029 HC NDL INSUF VERES DISP COVD -B: Performed by: SURGERY

## 2019-01-11 PROCEDURE — 77030035048 HC TRCR ENDOSC OPTCL COVD -B: Performed by: SURGERY

## 2019-01-11 PROCEDURE — 77030002933 HC SUT MCRYL J&J -A: Performed by: SURGERY

## 2019-01-11 PROCEDURE — 77030008756 HC TU IRR SUC STRY -B: Performed by: SURGERY

## 2019-01-11 PROCEDURE — 76060000034 HC ANESTHESIA 1.5 TO 2 HR: Performed by: SURGERY

## 2019-01-11 PROCEDURE — 77030039266 HC ADH SKN EXOFIN S2SG -A: Performed by: SURGERY

## 2019-01-11 PROCEDURE — 77030020747 HC TU INSUF ENDOSC TELE -A: Performed by: SURGERY

## 2019-01-11 DEVICE — MESH W/ECHO PS 11.4CM CIR -- VENTRALIGHT ORDER BY CA: Type: IMPLANTABLE DEVICE | Site: UMBILICAL | Status: FUNCTIONAL

## 2019-01-11 RX ORDER — SODIUM CHLORIDE, SODIUM LACTATE, POTASSIUM CHLORIDE, CALCIUM CHLORIDE 600; 310; 30; 20 MG/100ML; MG/100ML; MG/100ML; MG/100ML
75 INJECTION, SOLUTION INTRAVENOUS CONTINUOUS
Status: DISPENSED | OUTPATIENT
Start: 2019-01-11 | End: 2019-01-12

## 2019-01-11 RX ORDER — DOCUSATE SODIUM 100 MG/1
100 CAPSULE, LIQUID FILLED ORAL
Qty: 30 CAP | Refills: 0 | Status: SHIPPED | OUTPATIENT
Start: 2019-01-11 | End: 2019-06-24

## 2019-01-11 RX ORDER — MIDAZOLAM HYDROCHLORIDE 1 MG/ML
1 INJECTION, SOLUTION INTRAMUSCULAR; INTRAVENOUS AS NEEDED
Status: DISCONTINUED | OUTPATIENT
Start: 2019-01-11 | End: 2019-01-11 | Stop reason: HOSPADM

## 2019-01-11 RX ORDER — OXYCODONE HYDROCHLORIDE 5 MG/1
5 TABLET ORAL
Qty: 50 TAB | Refills: 0 | Status: SHIPPED | OUTPATIENT
Start: 2019-01-11 | End: 2019-04-17

## 2019-01-11 RX ORDER — FACIAL-BODY WIPES
10 EACH TOPICAL DAILY PRN
Status: DISCONTINUED | OUTPATIENT
Start: 2019-01-11 | End: 2019-01-12 | Stop reason: HOSPADM

## 2019-01-11 RX ORDER — SODIUM CHLORIDE 0.9 % (FLUSH) 0.9 %
5-40 SYRINGE (ML) INJECTION EVERY 8 HOURS
Status: DISCONTINUED | OUTPATIENT
Start: 2019-01-11 | End: 2019-01-12 | Stop reason: HOSPADM

## 2019-01-11 RX ORDER — ONDANSETRON 2 MG/ML
4 INJECTION INTRAMUSCULAR; INTRAVENOUS AS NEEDED
Status: DISCONTINUED | OUTPATIENT
Start: 2019-01-11 | End: 2019-01-11 | Stop reason: HOSPADM

## 2019-01-11 RX ORDER — AMLODIPINE BESYLATE 5 MG/1
5 TABLET ORAL DAILY
Status: DISCONTINUED | OUTPATIENT
Start: 2019-01-12 | End: 2019-01-12 | Stop reason: HOSPADM

## 2019-01-11 RX ORDER — SODIUM CHLORIDE 0.9 % (FLUSH) 0.9 %
5-40 SYRINGE (ML) INJECTION AS NEEDED
Status: DISCONTINUED | OUTPATIENT
Start: 2019-01-11 | End: 2019-01-11 | Stop reason: HOSPADM

## 2019-01-11 RX ORDER — MORPHINE SULFATE 10 MG/ML
2 INJECTION, SOLUTION INTRAMUSCULAR; INTRAVENOUS
Status: DISCONTINUED | OUTPATIENT
Start: 2019-01-11 | End: 2019-01-11 | Stop reason: HOSPADM

## 2019-01-11 RX ORDER — SODIUM CHLORIDE 9 MG/ML
50 INJECTION, SOLUTION INTRAVENOUS CONTINUOUS
Status: DISCONTINUED | OUTPATIENT
Start: 2019-01-11 | End: 2019-01-11 | Stop reason: HOSPADM

## 2019-01-11 RX ORDER — SODIUM CHLORIDE 0.9 % (FLUSH) 0.9 %
5-10 SYRINGE (ML) INJECTION AS NEEDED
Status: DISCONTINUED | OUTPATIENT
Start: 2019-01-11 | End: 2019-01-11 | Stop reason: HOSPADM

## 2019-01-11 RX ORDER — SODIUM CHLORIDE, SODIUM LACTATE, POTASSIUM CHLORIDE, CALCIUM CHLORIDE 600; 310; 30; 20 MG/100ML; MG/100ML; MG/100ML; MG/100ML
INJECTION, SOLUTION INTRAVENOUS
Status: DISCONTINUED | OUTPATIENT
Start: 2019-01-11 | End: 2019-01-11 | Stop reason: HOSPADM

## 2019-01-11 RX ORDER — DEXMEDETOMIDINE HYDROCHLORIDE 4 UG/ML
INJECTION, SOLUTION INTRAVENOUS AS NEEDED
Status: DISCONTINUED | OUTPATIENT
Start: 2019-01-11 | End: 2019-01-11 | Stop reason: HOSPADM

## 2019-01-11 RX ORDER — SODIUM CHLORIDE, SODIUM LACTATE, POTASSIUM CHLORIDE, CALCIUM CHLORIDE 600; 310; 30; 20 MG/100ML; MG/100ML; MG/100ML; MG/100ML
75 INJECTION, SOLUTION INTRAVENOUS CONTINUOUS
Status: DISCONTINUED | OUTPATIENT
Start: 2019-01-11 | End: 2019-01-11 | Stop reason: HOSPADM

## 2019-01-11 RX ORDER — ONDANSETRON 2 MG/ML
4 INJECTION INTRAMUSCULAR; INTRAVENOUS
Status: DISCONTINUED | OUTPATIENT
Start: 2019-01-11 | End: 2019-01-12 | Stop reason: HOSPADM

## 2019-01-11 RX ORDER — SODIUM CHLORIDE 0.9 % (FLUSH) 0.9 %
5-40 SYRINGE (ML) INJECTION EVERY 8 HOURS
Status: DISCONTINUED | OUTPATIENT
Start: 2019-01-11 | End: 2019-01-11 | Stop reason: HOSPADM

## 2019-01-11 RX ORDER — SODIUM CHLORIDE 0.9 % (FLUSH) 0.9 %
5-10 SYRINGE (ML) INJECTION EVERY 8 HOURS
Status: DISCONTINUED | OUTPATIENT
Start: 2019-01-11 | End: 2019-01-11 | Stop reason: HOSPADM

## 2019-01-11 RX ORDER — FENTANYL CITRATE 50 UG/ML
25 INJECTION, SOLUTION INTRAMUSCULAR; INTRAVENOUS
Status: COMPLETED | OUTPATIENT
Start: 2019-01-11 | End: 2019-01-11

## 2019-01-11 RX ORDER — NEOSTIGMINE METHYLSULFATE 1 MG/ML
INJECTION INTRAVENOUS AS NEEDED
Status: DISCONTINUED | OUTPATIENT
Start: 2019-01-11 | End: 2019-01-11 | Stop reason: HOSPADM

## 2019-01-11 RX ORDER — MIDAZOLAM HYDROCHLORIDE 1 MG/ML
0.5 INJECTION, SOLUTION INTRAMUSCULAR; INTRAVENOUS
Status: DISCONTINUED | OUTPATIENT
Start: 2019-01-11 | End: 2019-01-11 | Stop reason: HOSPADM

## 2019-01-11 RX ORDER — OXYCODONE HYDROCHLORIDE 5 MG/1
10 TABLET ORAL
Status: DISCONTINUED | OUTPATIENT
Start: 2019-01-11 | End: 2019-01-12 | Stop reason: HOSPADM

## 2019-01-11 RX ORDER — NALOXONE HYDROCHLORIDE 0.4 MG/ML
0.4 INJECTION, SOLUTION INTRAMUSCULAR; INTRAVENOUS; SUBCUTANEOUS AS NEEDED
Status: DISCONTINUED | OUTPATIENT
Start: 2019-01-11 | End: 2019-01-12 | Stop reason: HOSPADM

## 2019-01-11 RX ORDER — SUCCINYLCHOLINE CHLORIDE 20 MG/ML
INJECTION INTRAMUSCULAR; INTRAVENOUS AS NEEDED
Status: DISCONTINUED | OUTPATIENT
Start: 2019-01-11 | End: 2019-01-11 | Stop reason: HOSPADM

## 2019-01-11 RX ORDER — DEXAMETHASONE SODIUM PHOSPHATE 4 MG/ML
INJECTION, SOLUTION INTRA-ARTICULAR; INTRALESIONAL; INTRAMUSCULAR; INTRAVENOUS; SOFT TISSUE AS NEEDED
Status: DISCONTINUED | OUTPATIENT
Start: 2019-01-11 | End: 2019-01-11 | Stop reason: HOSPADM

## 2019-01-11 RX ORDER — MIDAZOLAM HYDROCHLORIDE 1 MG/ML
INJECTION, SOLUTION INTRAMUSCULAR; INTRAVENOUS AS NEEDED
Status: DISCONTINUED | OUTPATIENT
Start: 2019-01-11 | End: 2019-01-11 | Stop reason: HOSPADM

## 2019-01-11 RX ORDER — HYDROCODONE BITARTRATE AND ACETAMINOPHEN 5; 325 MG/1; MG/1
1 TABLET ORAL AS NEEDED
Status: DISCONTINUED | OUTPATIENT
Start: 2019-01-11 | End: 2019-01-11 | Stop reason: HOSPADM

## 2019-01-11 RX ORDER — NEBIVOLOL 5 MG/1
10 TABLET ORAL DAILY
Status: DISCONTINUED | OUTPATIENT
Start: 2019-01-12 | End: 2019-01-12 | Stop reason: HOSPADM

## 2019-01-11 RX ORDER — SODIUM CHLORIDE, SODIUM LACTATE, POTASSIUM CHLORIDE, CALCIUM CHLORIDE 600; 310; 30; 20 MG/100ML; MG/100ML; MG/100ML; MG/100ML
125 INJECTION, SOLUTION INTRAVENOUS CONTINUOUS
Status: DISCONTINUED | OUTPATIENT
Start: 2019-01-11 | End: 2019-01-11 | Stop reason: HOSPADM

## 2019-01-11 RX ORDER — SPIRONOLACTONE 25 MG/1
50 TABLET ORAL DAILY
Status: DISCONTINUED | OUTPATIENT
Start: 2019-01-12 | End: 2019-01-12 | Stop reason: HOSPADM

## 2019-01-11 RX ORDER — CEFAZOLIN SODIUM/WATER 2 G/20 ML
2 SYRINGE (ML) INTRAVENOUS ONCE
Status: COMPLETED | OUTPATIENT
Start: 2019-01-11 | End: 2019-01-11

## 2019-01-11 RX ORDER — HYDROMORPHONE HYDROCHLORIDE 2 MG/ML
1 INJECTION, SOLUTION INTRAMUSCULAR; INTRAVENOUS; SUBCUTANEOUS
Status: DISCONTINUED | OUTPATIENT
Start: 2019-01-11 | End: 2019-01-12 | Stop reason: HOSPADM

## 2019-01-11 RX ORDER — PROPOFOL 10 MG/ML
INJECTION, EMULSION INTRAVENOUS AS NEEDED
Status: DISCONTINUED | OUTPATIENT
Start: 2019-01-11 | End: 2019-01-11 | Stop reason: HOSPADM

## 2019-01-11 RX ORDER — DOCUSATE SODIUM 100 MG/1
100 CAPSULE, LIQUID FILLED ORAL 2 TIMES DAILY
Status: DISCONTINUED | OUTPATIENT
Start: 2019-01-11 | End: 2019-01-12 | Stop reason: HOSPADM

## 2019-01-11 RX ORDER — SODIUM CHLORIDE 9 MG/ML
25 INJECTION, SOLUTION INTRAVENOUS CONTINUOUS
Status: DISCONTINUED | OUTPATIENT
Start: 2019-01-11 | End: 2019-01-11 | Stop reason: HOSPADM

## 2019-01-11 RX ORDER — GLYCOPYRROLATE 0.2 MG/ML
INJECTION INTRAMUSCULAR; INTRAVENOUS AS NEEDED
Status: DISCONTINUED | OUTPATIENT
Start: 2019-01-11 | End: 2019-01-11 | Stop reason: HOSPADM

## 2019-01-11 RX ORDER — ONDANSETRON 2 MG/ML
INJECTION INTRAMUSCULAR; INTRAVENOUS AS NEEDED
Status: DISCONTINUED | OUTPATIENT
Start: 2019-01-11 | End: 2019-01-11 | Stop reason: HOSPADM

## 2019-01-11 RX ORDER — LIDOCAINE HYDROCHLORIDE 20 MG/ML
INJECTION, SOLUTION EPIDURAL; INFILTRATION; INTRACAUDAL; PERINEURAL AS NEEDED
Status: DISCONTINUED | OUTPATIENT
Start: 2019-01-11 | End: 2019-01-11 | Stop reason: HOSPADM

## 2019-01-11 RX ORDER — FUROSEMIDE 20 MG/1
20 TABLET ORAL DAILY
Status: DISCONTINUED | OUTPATIENT
Start: 2019-01-12 | End: 2019-01-12 | Stop reason: HOSPADM

## 2019-01-11 RX ORDER — LIDOCAINE HYDROCHLORIDE 10 MG/ML
0.1 INJECTION, SOLUTION EPIDURAL; INFILTRATION; INTRACAUDAL; PERINEURAL AS NEEDED
Status: DISCONTINUED | OUTPATIENT
Start: 2019-01-11 | End: 2019-01-11 | Stop reason: HOSPADM

## 2019-01-11 RX ORDER — SODIUM CHLORIDE 0.9 % (FLUSH) 0.9 %
5-40 SYRINGE (ML) INJECTION AS NEEDED
Status: DISCONTINUED | OUTPATIENT
Start: 2019-01-11 | End: 2019-01-12 | Stop reason: HOSPADM

## 2019-01-11 RX ORDER — BUPIVACAINE HYDROCHLORIDE AND EPINEPHRINE 5; 5 MG/ML; UG/ML
INJECTION, SOLUTION EPIDURAL; INTRACAUDAL; PERINEURAL AS NEEDED
Status: DISCONTINUED | OUTPATIENT
Start: 2019-01-11 | End: 2019-01-11 | Stop reason: HOSPADM

## 2019-01-11 RX ORDER — ROCURONIUM BROMIDE 10 MG/ML
INJECTION, SOLUTION INTRAVENOUS AS NEEDED
Status: DISCONTINUED | OUTPATIENT
Start: 2019-01-11 | End: 2019-01-11 | Stop reason: HOSPADM

## 2019-01-11 RX ORDER — FENTANYL CITRATE 50 UG/ML
INJECTION, SOLUTION INTRAMUSCULAR; INTRAVENOUS AS NEEDED
Status: DISCONTINUED | OUTPATIENT
Start: 2019-01-11 | End: 2019-01-11 | Stop reason: HOSPADM

## 2019-01-11 RX ORDER — FENTANYL CITRATE 50 UG/ML
50 INJECTION, SOLUTION INTRAMUSCULAR; INTRAVENOUS AS NEEDED
Status: DISCONTINUED | OUTPATIENT
Start: 2019-01-11 | End: 2019-01-11 | Stop reason: HOSPADM

## 2019-01-11 RX ORDER — OXYCODONE HYDROCHLORIDE 5 MG/1
5 TABLET ORAL
Status: DISCONTINUED | OUTPATIENT
Start: 2019-01-11 | End: 2019-01-12 | Stop reason: HOSPADM

## 2019-01-11 RX ADMIN — FENTANYL CITRATE 25 MCG: 50 INJECTION INTRAMUSCULAR; INTRAVENOUS at 15:45

## 2019-01-11 RX ADMIN — DEXMEDETOMIDINE HYDROCHLORIDE 4 MCG: 4 INJECTION, SOLUTION INTRAVENOUS at 15:24

## 2019-01-11 RX ADMIN — MORPHINE SULFATE 2 MG: 10 INJECTION INTRAVENOUS at 16:20

## 2019-01-11 RX ADMIN — FENTANYL CITRATE 50 MCG: 50 INJECTION, SOLUTION INTRAMUSCULAR; INTRAVENOUS at 14:57

## 2019-01-11 RX ADMIN — ROCURONIUM BROMIDE 10 MG: 10 INJECTION, SOLUTION INTRAVENOUS at 14:23

## 2019-01-11 RX ADMIN — ROCURONIUM BROMIDE 10 MG: 10 INJECTION, SOLUTION INTRAVENOUS at 14:12

## 2019-01-11 RX ADMIN — FENTANYL CITRATE 50 MCG: 50 INJECTION, SOLUTION INTRAMUSCULAR; INTRAVENOUS at 13:48

## 2019-01-11 RX ADMIN — ONDANSETRON 4 MG: 2 INJECTION INTRAMUSCULAR; INTRAVENOUS at 15:05

## 2019-01-11 RX ADMIN — SODIUM CHLORIDE, SODIUM LACTATE, POTASSIUM CHLORIDE, CALCIUM CHLORIDE: 600; 310; 30; 20 INJECTION, SOLUTION INTRAVENOUS at 13:48

## 2019-01-11 RX ADMIN — GLYCOPYRROLATE 0.2 MG: 0.2 INJECTION INTRAMUSCULAR; INTRAVENOUS at 15:05

## 2019-01-11 RX ADMIN — DEXMEDETOMIDINE HYDROCHLORIDE 4 MCG: 4 INJECTION, SOLUTION INTRAVENOUS at 15:21

## 2019-01-11 RX ADMIN — MORPHINE SULFATE 2 MG: 10 INJECTION INTRAVENOUS at 16:15

## 2019-01-11 RX ADMIN — NEOSTIGMINE METHYLSULFATE 1 MG: 1 INJECTION INTRAVENOUS at 15:08

## 2019-01-11 RX ADMIN — OXYCODONE HYDROCHLORIDE 10 MG: 5 TABLET ORAL at 19:52

## 2019-01-11 RX ADMIN — LIDOCAINE HYDROCHLORIDE 100 MG: 20 INJECTION, SOLUTION EPIDURAL; INFILTRATION; INTRACAUDAL; PERINEURAL at 13:59

## 2019-01-11 RX ADMIN — NEOSTIGMINE METHYLSULFATE 1 MG: 1 INJECTION INTRAVENOUS at 15:06

## 2019-01-11 RX ADMIN — PROPOFOL 200 MG: 10 INJECTION, EMULSION INTRAVENOUS at 14:00

## 2019-01-11 RX ADMIN — DEXMEDETOMIDINE HYDROCHLORIDE 4 MCG: 4 INJECTION, SOLUTION INTRAVENOUS at 15:08

## 2019-01-11 RX ADMIN — ROCURONIUM BROMIDE 20 MG: 10 INJECTION, SOLUTION INTRAVENOUS at 14:09

## 2019-01-11 RX ADMIN — SODIUM CHLORIDE, SODIUM LACTATE, POTASSIUM CHLORIDE, AND CALCIUM CHLORIDE 75 ML/HR: 600; 310; 30; 20 INJECTION, SOLUTION INTRAVENOUS at 16:28

## 2019-01-11 RX ADMIN — DEXMEDETOMIDINE HYDROCHLORIDE 4 MCG: 4 INJECTION, SOLUTION INTRAVENOUS at 15:12

## 2019-01-11 RX ADMIN — FENTANYL CITRATE 25 MCG: 50 INJECTION INTRAMUSCULAR; INTRAVENOUS at 15:55

## 2019-01-11 RX ADMIN — ROCURONIUM BROMIDE 10 MG: 10 INJECTION, SOLUTION INTRAVENOUS at 14:00

## 2019-01-11 RX ADMIN — FENTANYL CITRATE 50 MCG: 50 INJECTION, SOLUTION INTRAMUSCULAR; INTRAVENOUS at 15:08

## 2019-01-11 RX ADMIN — FENTANYL CITRATE 25 MCG: 50 INJECTION INTRAMUSCULAR; INTRAVENOUS at 15:40

## 2019-01-11 RX ADMIN — MIDAZOLAM HYDROCHLORIDE 2 MG: 1 INJECTION, SOLUTION INTRAMUSCULAR; INTRAVENOUS at 13:48

## 2019-01-11 RX ADMIN — DOCUSATE SODIUM 100 MG: 100 CAPSULE, LIQUID FILLED ORAL at 18:29

## 2019-01-11 RX ADMIN — DEXMEDETOMIDINE HYDROCHLORIDE 4 MCG: 4 INJECTION, SOLUTION INTRAVENOUS at 15:25

## 2019-01-11 RX ADMIN — FENTANYL CITRATE 50 MCG: 50 INJECTION, SOLUTION INTRAMUSCULAR; INTRAVENOUS at 13:51

## 2019-01-11 RX ADMIN — DEXAMETHASONE SODIUM PHOSPHATE 4 MG: 4 INJECTION, SOLUTION INTRA-ARTICULAR; INTRALESIONAL; INTRAMUSCULAR; INTRAVENOUS; SOFT TISSUE at 14:11

## 2019-01-11 RX ADMIN — MORPHINE SULFATE 2 MG: 10 INJECTION INTRAVENOUS at 16:10

## 2019-01-11 RX ADMIN — SUCCINYLCHOLINE CHLORIDE 140 MG: 20 INJECTION INTRAMUSCULAR; INTRAVENOUS at 14:00

## 2019-01-11 RX ADMIN — GLYCOPYRROLATE 0.2 MG: 0.2 INJECTION INTRAMUSCULAR; INTRAVENOUS at 15:07

## 2019-01-11 RX ADMIN — FENTANYL CITRATE 25 MCG: 50 INJECTION INTRAMUSCULAR; INTRAVENOUS at 15:50

## 2019-01-11 RX ADMIN — DEXMEDETOMIDINE HYDROCHLORIDE 4 MCG: 4 INJECTION, SOLUTION INTRAVENOUS at 15:23

## 2019-01-11 RX ADMIN — Medication 2 G: at 13:50

## 2019-01-11 NOTE — ROUTINE PROCESS
Patient: Brenda Truong MRN: [id number]  SSN: [id number] YOB: 1975  Age: 41 y.o. Sex: male Patient is status post Procedure(s): LAPAROSCOPIC UMBILICAL HERNIA REPAIR, POSSIBLE OPEN. Surgeon(s) and Role: Adam Cheadle, MD - Primary Local/Dose/Irrigation:  See intraop Airway - Endotracheal Tube 01/11/19 Oral (Active) Dressing/Packing:    
Splint/Cast:  ] Other:

## 2019-01-11 NOTE — OP NOTES
[address] [place] [address] Adelfo Ybarra  MR#: [id number]  : 1975  ACCOUNT #: [de-identified]   DATE OF SERVICE: 2019    PREOPERATIVE DIAGNOSES:  1.  Umbilical hernia. 2.  Cirrhosis. POSTOPERATIVE DIAGNOSES:  1.  Umbilical hernia. 2.  Cirrhosis. PROCEDURE PERFORMED:  Laparoscopic umbilical hernia repair with revision of periumbilical skin. SURGEON:  Daja Boston MD    ASSISTANT:  Luis Antonio     ANESTHESIA:  General.    ESTIMATED BLOOD LOSS:  10 mL. FINDINGS:  The patient had a less than 1 cm umbilical hernia with a large hernia sac external to this that contained ascites. The patient did have nodular cirrhosis. There was no significant intraabdominal ascites. INDICATIONS:  The patient is a 72-year-old gentleman who was diagnosed with cirrhosis at the time of initial surgery for possible umbilical hernia repair and this was aborted secondary to the finding of cirrhosis. He has been managed and optimized by Hepatology Service and is now well compensated without evidence of ascites and he was referred for surgical repair of his hernia. A complete discussion of risks, benefits and alternatives of surgery were had with the patient, including discussions regarding the possibility of hepatic decompensation and death in addition to the normal risk factors for umbilical hernia repair. He was in agreement to proceed and informed consent was obtained. DESCRIPTION OF THE OPERATION:  After informed consent was obtained, the patient was brought back to the operating room. He was placed under general endotracheal anesthesia in the supine position on the operating room table. He was then prepped and draped in the usual sterile fashion, and a proper time-out was performed. With this completed, we placed a Veress needle in the left upper quadrant of the abdomen 1 fingerbreadth below the costal margin.   The Veress needle was placed without difficulty and a standard water drop technique test was performed. The abdomen was insufflated to 15 mmHg. We then injected local anesthetic into the left upper quadrant a little bit more laterally at the place of his previous 5 mm trocar site from laparoscopic exploration. We then made a 5 mm incision at this previous scar site and placed a 5 mm Optiview trocar with the scope inserted. The abdomen was entered safely. The Veress needle site was inspected and there was no evidence of injury and this was removed. We inspected the hernia from the inside and this defect was actually noted to be quite small, approximately 1 cm in size. There was no incarcerated omentum or bowel and in attempting to decompress the hernia we noted that this was all ascites fluid. There was no other significant ascites within the abdomen. The patient did have findings of nodular cirrhosis, which was consistent with preoperative diagnosis. At this time, we allowed the abdomen to desufflate and as the patient had a large hernia sac external to the defect that was containing fluid, we felt like this would need to be resected including the stretched out and a diseased skin in order to allow for appropriate repair of the hernia. To do this, we made an elliptical incision around the hernia sac going from the inferior aspect of the umbilicus in a cranial to caudal direction around the midline. The skin was very thinned out in this area and just with going through the dermis, we encountered the hernia sac and ascites fluid was expressed. We dissected the hernia sac free from the surrounding subcutaneous tissue using electrocautery down to the level of the fascia. This was transected at its base around the fascia and then passed off as a hernia sac and skin. The umbilical hernia defect was then closed using 3 interrupted #1 PDS sutures in interrupted fashion. This provided nice closure of the defect.   We then closed the elliptical incision in a longitudinal fashion from cranial to caudal in direction using interrupted 2-0 Vicryl sutures to reapproximate the deeper subcutaneous tissue and to tack this down to the underlying fascia. This provided a nice cosmetic closure. With this completed, we then allowed the abdomen to reinsufflate and inspected our closure. This was nicely closed from internal examination. We then placed additional trocars including a left mid abdominal anterior axillary line 12 mm trocar and a left lower quadrant 5 mm trocar in the midclavicular line. These were placed after injection of local anesthetic and under direct visualization. Once this was completed, we passed a 4.5 inch round Ventralight ST mesh with Echo balloon into the abdomen. The balloon catheter was grasped using a suture passer and brought up through the small defect at the umbilicus. The balloon was then insufflated in standard fashion and this oriented the mesh nicely overlying the umbilical hernia defect. Once the mesh was in the appropriate position, we used a SecureStrap absorbable tacking device to place an outer row of tacks around the edge of the mesh. The balloon catheter was then cut and the Echo balloon was removed through the 12 mm trocar site. We then placed an additional inner row of tacks for a double crown technique. This provided nice apposition of the mesh against the anterior abdominal wall. We inspected and there were no signs of bleeding. The small amount of ascites that had been allowed into the abdomen from the hernia sac was suctioned   out using a suction  from the pelvis and around the liver. Again, there was not a significant quantity of intra-abdominal ascites identified. We then turned our attention to closure of the 12 mm trocar site. This was done using a 0 Vicryl suture on a suture passer in figure-of-eight fashion. This provided nice closure of this defect.   We then allowed the abdomen to desufflate and the remaining two 5 mm trocars were removed. All skin incisions were then closed using a 4-0 Monocryl subcuticular stitch and then covered with Dermabond skin adhesive. The Veress needle site was covered with Dermabond skin adhesive. We then placed a pressure dressing over the umbilicus in order to help close down this cavity and this was covered with a Tegaderm dressing. The patient was then awakened, extubated, and taken to the post-anesthesia care unit in stable condition. All needle and instrument counts were correct at completion of the case. I was present and scrubbed throughout the entirety of the case. There were no immediate complications. SPECIMENS REMOVED:  Hernia sac. DISPOSITION OF SPECIMENS:  Pathology. COMPLICATIONS:  None. IMPLANTS:  A 4.5 inch round Ventralight ST Echo balloon ventral hernia mesh. DISPOSITION:  PACU.       MD JUAN CARLOS Lawson /   D: 01/11/2019 16:09     T: 01/11/2019 17:21  JOB #: [id number]

## 2019-01-11 NOTE — PERIOP NOTES
TRANSFER - OUT REPORT: 
 
Verbal report given to [facility], RN(name) on [employer]  being transferred to 5E(unit) for routine post - op Report consisted of patients Situation, Background, Assessment and  
Recommendations(SBAR). Time Pre op antibiotic given:1350 Anesthesia Stop time: 32 61 16 Toth Present on Transfer to floor:n/a Order for Toth on Chart:n/a 
Discharge Prescriptions with Chart:n/a Information from the following report(s) SBAR, Kardex, OR Summary, Procedure Summary, Intake/Output, MAR, Recent Results and Cardiac Rhythm sR was reviewed with the receiving nurse. Opportunity for questions and clarification was provided. Is the patient on 02? NO Is the patient on a monitor? NO Is the nurse transporting with the patient? NO Surgical Waiting Area notified of patient's transfer from PACU? YES The following personal items collected during your admission accompanied patient upon transfer:  
Dental Appliance: Dental Appliances: None Vision: Visual Aid: Glasses(glasses to [employer]) Hearing Aid:   
Jewelry: Jewelry: None Clothing: Clothing: Other (comment)(bag of clothes and glasses returned to pt in PACU) Other Valuables: Other Valuables: Eyeglasses(glassses to pacu) Valuables sent to safe:

## 2019-01-11 NOTE — TELEPHONE ENCOUNTER
Attempted this [phone] twice and was told it was a hearing impaired line. The person who answered attempted to dial the number but there was no answer.

## 2019-01-11 NOTE — ROUTINE PROCESS
Patient: Leda Plummer MRN: [id number]  SSN: [id number] YOB: 1975  Age: 41 y.o. Sex: male Patient is status post Procedure(s): LAPAROSCOPIC UMBILICAL HERNIA REPAIR, POSSIBLE OPEN. Surgeon(s) and Role: Teena Richter MD - Primary Local/Dose/Irrigation:  30ml 0.5% bupivacaine with 1-200,000 epi:  Normal saline for irrigation Dressing/Packing:  Wound Umbilicus-DRESSING TYPE: 4 x 4;Topical skin adhesive/glue;Transparent film(qty 1 12 trocar site at umbilicus, exofin, 4x4 and tegaderm) (01/11/19 1511) Wound Abdomen-DRESSING TYPE: Topical skin adhesive/glue(exofin for 2 trocar 5mm sites and one 12mm trocar (3 trocar)) (01/11/19 1511) Splint/Cast:  ] 3 - trocar sites - left abdomen 1 - puncture site - left upper abdomen 1 - incision umbilicus Other:  SCDs

## 2019-01-11 NOTE — BRIEF OP NOTE
BRIEF OPERATIVE NOTE Date of Procedure: 2019 Preoperative Diagnosis: UMBILICAL HERNIA, CIRRHOSIS Postoperative Diagnosis: UMBILICAL HERNIA, CIRRHOSIS Procedure(s): LAPAROSCOPIC UMBILICAL HERNIA REPAIR WITH REVISION OF PERIUMBILICAL SKIN Surgeon(s) and Role: Tangela Smith MD - Primary Surgical Assistant: Arden Huizar Surgical Staff: 
Circ-1: Danilo Ceron RN 
Circ-Relief: [facility] Scrub Tech-1: Betty Delgado Scrub RN-Relief: Shi Fuentes RN Surg Asst-1: Luis Antonio 
Event Time In Time Out Incision Start [phone] Incision Close 1519 Anesthesia: General  
Estimated Blood Loss: 10 mL Specimens:  
ID Type Source Tests Collected by Time Destination 1 : hernia sac Fresh Hernia Sac  Isai Reardon MD 2019 1428 Pathology Findings: < 1 cm umbilical hernia with large hernia sac containing ascites fluid. Nodular cirrhosis. No significant ascites. Complications: None Implants:  
Implant Name Type Inv. Item Serial No.  Lot No. LRB No. Used Action MESH W/ECHO PS 11.4CM CIR -- VENTRALIGHT ORDER BY CA - SN/A  MESH W/ECHO PS 11.4CM CIR -- VENTRALIGHT ORDER BY CA N/A BARD DAVOL [id number] N/A 1 Implanted

## 2019-01-11 NOTE — ANESTHESIA PREPROCEDURE EVALUATION
Anesthetic History No history of anesthetic complications Review of Systems / Medical History Patient summary reviewed, nursing notes reviewed and pertinent labs reviewed Pulmonary Within defined limits Neuro/Psych Within defined limits Cardiovascular Within defined limits Hypertension GI/Hepatic/Renal 
Within defined limits Endo/Other Within defined limits Diabetes Obesity Other Findings Physical Exam 
 
Airway Mallampati: II 
TM Distance: > 6 cm Neck ROM: normal range of motion Mouth opening: Normal 
 
 Cardiovascular Regular rate and rhythm,  S1 and S2 normal,  no murmur, click, rub, or gallop Dental 
No notable dental hx Pulmonary Breath sounds clear to auscultation Abdominal 
GI exam deferred Other Findings Anesthetic Plan ASA: 2 Anesthesia type: general 
 
 
 
 
Induction: Intravenous Anesthetic plan and risks discussed with: Patient

## 2019-01-12 VITALS
BODY MASS INDEX: 32.54 KG/M2 | WEIGHT: 219.7 LBS | DIASTOLIC BLOOD PRESSURE: 79 MMHG | HEART RATE: 67 BPM | RESPIRATION RATE: 16 BRPM | TEMPERATURE: 98.9 F | SYSTOLIC BLOOD PRESSURE: 132 MMHG | HEIGHT: 69 IN | OXYGEN SATURATION: 97 %

## 2019-01-12 LAB
ALBUMIN SERPL-MCNC: 3.6 G/DL (ref 3.5–5)
ALBUMIN/GLOB SERPL: 0.7 {RATIO} (ref 1.1–2.2)
ALP SERPL-CCNC: 106 U/L (ref 45–117)
ALT SERPL-CCNC: 104 U/L (ref 12–78)
ANION GAP SERPL CALC-SCNC: 10 MMOL/L (ref 5–15)
AST SERPL-CCNC: 100 U/L (ref 15–37)
BILIRUB SERPL-MCNC: 1.5 MG/DL (ref 0.2–1)
BUN SERPL-MCNC: 12 MG/DL (ref 6–20)
BUN/CREAT SERPL: 16 (ref 12–20)
CALCIUM SERPL-MCNC: 9.3 MG/DL (ref 8.5–10.1)
CHLORIDE SERPL-SCNC: 103 MMOL/L (ref 97–108)
CO2 SERPL-SCNC: 24 MMOL/L (ref 21–32)
CREAT SERPL-MCNC: 0.76 MG/DL (ref 0.7–1.3)
ERYTHROCYTE [DISTWIDTH] IN BLOOD BY AUTOMATED COUNT: 11.5 % (ref 11.5–14.5)
GLOBULIN SER CALC-MCNC: 4.9 G/DL (ref 2–4)
GLUCOSE SERPL-MCNC: 126 MG/DL (ref 65–100)
HCT VFR BLD AUTO: 42.3 % (ref 36.6–50.3)
HGB BLD-MCNC: 14.4 G/DL (ref 12.1–17)
MCH RBC QN AUTO: 32.4 PG (ref 26–34)
MCHC RBC AUTO-ENTMCNC: 34 G/DL (ref 30–36.5)
MCV RBC AUTO: 95.3 FL (ref 80–99)
NRBC # BLD: 0 K/UL (ref 0–0.01)
NRBC BLD-RTO: 0 PER 100 WBC
PLATELET # BLD AUTO: 193 K/UL (ref 150–400)
PMV BLD AUTO: 10.6 FL (ref 8.9–12.9)
POTASSIUM SERPL-SCNC: 4 MMOL/L (ref 3.5–5.1)
PROT SERPL-MCNC: 8.5 G/DL (ref 6.4–8.2)
RBC # BLD AUTO: 4.44 M/UL (ref 4.1–5.7)
SODIUM SERPL-SCNC: 137 MMOL/L (ref 136–145)
WBC # BLD AUTO: 11.9 K/UL (ref 4.1–11.1)

## 2019-01-12 PROCEDURE — 74011250636 HC RX REV CODE- 250/636: Performed by: SURGERY

## 2019-01-12 PROCEDURE — 90471 IMMUNIZATION ADMIN: CPT

## 2019-01-12 PROCEDURE — 90686 IIV4 VACC NO PRSV 0.5 ML IM: CPT | Performed by: SURGERY

## 2019-01-12 PROCEDURE — 99218 HC RM OBSERVATION: CPT

## 2019-01-12 PROCEDURE — 36415 COLL VENOUS BLD VENIPUNCTURE: CPT

## 2019-01-12 PROCEDURE — 80053 COMPREHEN METABOLIC PANEL: CPT

## 2019-01-12 PROCEDURE — 74011250637 HC RX REV CODE- 250/637: Performed by: SURGERY

## 2019-01-12 PROCEDURE — 85027 COMPLETE CBC AUTOMATED: CPT

## 2019-01-12 RX ADMIN — OXYCODONE HYDROCHLORIDE 10 MG: 5 TABLET ORAL at 09:46

## 2019-01-12 RX ADMIN — SPIRONOLACTONE 50 MG: 25 TABLET, FILM COATED ORAL at 09:46

## 2019-01-12 RX ADMIN — INFLUENZA VIRUS VACCINE 0.5 ML: 15; 15; 15; 15 SUSPENSION INTRAMUSCULAR at 12:44

## 2019-01-12 RX ADMIN — NEBIVOLOL HYDROCHLORIDE 10 MG: 5 TABLET ORAL at 09:46

## 2019-01-12 RX ADMIN — DOCUSATE SODIUM 100 MG: 100 CAPSULE, LIQUID FILLED ORAL at 09:46

## 2019-01-12 RX ADMIN — AMLODIPINE BESYLATE 5 MG: 5 TABLET ORAL at 09:46

## 2019-01-12 RX ADMIN — FUROSEMIDE 20 MG: 20 TABLET ORAL at 09:46

## 2019-01-12 RX ADMIN — Medication 10 ML: at 05:20

## 2019-01-12 NOTE — DISCHARGE INSTRUCTIONS
Umbilical Hernia Repair      Patient Discharge Instructions    Spencer Kearney / [id number] : 1975    Admitted 2019 Discharged: 2019     · It is important that you take the medication exactly as they are prescribed. · Keep your medication in the bottles provided by the pharmacist and keep a list of the medication names, dosages, and times to be taken in your wallet. · Do not take other medications without consulting your doctor. · Wound care: You may shower starting tomorrow. Do not remove the dermabond on the incision, it will fall of on its own in a few weeks. · No heavy lifting or strenuous activity for 4-6 weeks after the operation    Take ibuprofen (Motrin) as scheduled then combine with oxycodone (Oxycontin, Oxyir) as needed for severe pain. What to do at Home    See instructions below. Follow-up with Dr. Thierry Jones in 2 week(s). Call the office ([phone]) to schedule your appointment. DISCHARGE SUMMARY from Nurse        The discharge information has been reviewed with the patient. The patient verbalized understanding. Discharge medications reviewed with the patient and appropriate educational materials and side effects teaching were provided. Information obtained by :  I understand that if any problems occur once I am at home I am to contact my physician. I understand and acknowledge receipt of the instructions indicated above.                                                                                                                                            Physician's or R.N.'s Signature                                                                  Date/Time                                                                                                                                              Patient or Representative Signature                                                          Date/Time     Umbilical Hernia Repair: What to Expect at Home  Your Recovery  You are likely to have pain for the next few days. You may also feel like you have the flu, and you may have a low fever and feel tired and nauseated. This is common. You should feel better after a few days and will probably feel much better in 7 days. For several weeks you may feel twinges or pulling in the hernia repair when you move. This care sheet gives you a general idea about how long it will take for you to recover. But each person recovers at a different pace. Follow the steps below to get better as quickly as possible. How can you care for yourself at home? Activity  · Rest when you feel tired. Getting enough sleep will help you recover. Sleep with your head up by using three or four pillows. You can also try to sleep with your head up in a recliner chair. Do not sleep on your side or stomach. · Try to walk each day. Start by walking a little more than you did the day before. Bit by bit, increase the amount you walk. Walking boosts blood flow and helps prevent pneumonia and constipation. · Put ice or a cold pack on the area of your hernia repair for 10 to 20 minutes at a time. Try to do this every 1 to 2 hours for the first 24 hours (when you are awake) or until the swelling goes down. Put a thin cloth between the ice and your skin. · Avoid strenuous activities, such as biking, jogging, weight lifting, or aerobic exercise, until your doctor says it is okay. · Avoid lifting anything that would make you strain. This may include heavy grocery bags and milk containers, a heavy briefcase or backpack, cat litter or dog food bags, a vacuum , or a child. · You may drive when you are no longer taking pain medicine and can quickly move your foot from the gas pedal to the brake. You must also be able to sit comfortably for a long period of time, even if you do not plan to go far. You might get caught in traffic.   · Most people are able to return to work within 1 to 2 weeks after surgery. · You may shower 24 to 48 hours after surgery, if your doctor okays it. Pat the cut (incision) dry. Do not take a bath for the first 2 weeks, or until your doctor tells you it is okay. · Your doctor will tell you when you can have sex again. Diet  · You can eat your normal diet. If your stomach is upset, try bland, low-fat foods like plain rice, broiled chicken, toast, and yogurt. · Drink plenty of fluids (unless your doctor tells you not to). · You may notice that your bowel movements are not regular right after your surgery. This is common. Avoid constipation and straining with bowel movements. You may want to take a fiber supplement every day. If you have not had a bowel movement after a couple of days, ask your doctor about taking a mild laxative. Medicines  · Take pain medicines exactly as directed. ¨ If the doctor gave you a prescription medicine for pain, take it as prescribed. ¨ If you are not taking a prescription pain medicine, take an over-the-counter medicine such as ibuprofen (Advil, Motrin), or naproxen (Aleve). Read and follow all instructions on the label. ¨ Do not take two or more pain medicines at the same time unless the doctor told you to. Many pain medicines have acetaminophen, which is Tylenol. Too much acetaminophen (Tylenol) can be harmful. · If your doctor prescribed antibiotics, take them as directed. Do not stop taking them just because you feel better. You need to take the full course of antibiotics. · If you think your pain medicine is making you sick to your stomach:  ¨ Take your medicine after meals (unless your doctor has told you not to). ¨ Ask your doctor for a different pain medicine. Incision care  · Wash the area daily with warm, soapy water and pat it dry. Follow-up care is a key part of your treatment and safety. Be sure to make and go to all appointments, and call your doctor if you are having problems.  It's also a good idea to know your test results and keep a list of the medicines you take. When should you call for help? Call 911 anytime you think you may need emergency care. For example, call if:  · You passed out (lost consciousness). · You have sudden chest pain and shortness of breath, or you cough up blood. · You have severe pain in your belly. Call your doctor now or seek immediate medical care if:  · You are sick to your stomach and cannot keep fluids down. · You have signs of a blood clot, such as:  ¨ Pain in your calf, back of the knee, thigh, or groin. ¨ Redness and swelling in your leg or groin. · You have trouble passing urine or stool, especially if you have mild pain or swelling in your lower belly. · Bright red blood has soaked through the bandage over your incision. Watch closely for any changes in your health, and be sure to contact your doctor if:  · Your swelling is getting worse. · Your swelling is not going down. Where can you learn more? Go to [employer]  Enter X607 in the search box to learn more about \"Hernia Repair: What to Expect at Home. \"   © 4638-7506 Healthwise, Incorporated. Care instructions adapted under license by Jami Maier La Felipe Renner (which disclaims liability or warranty for this information). This care instruction is for use with your licensed healthcare professional. If you have questions about a medical condition or this instruction, always ask your healthcare professional. [place] [address] any warranty or liability for your use of this information. Content Version: 3.7.17889;  Last Revised: January 21, 2011

## 2019-01-12 NOTE — PROGRESS NOTES
Bedside shift change report given to Leila Pinto (oncoming nurse) by Jessica (offgoing nurse). Report included the following information SBAR, Kardex, Procedure Summary, Intake/Output, MAR and Recent Results.

## 2019-01-12 NOTE — PROGRESS NOTES
Progress Note Patient: Sergei Alcala MRN: [id number]  SSN: [id number] YOB: 1975  Age: 41 y.o. Sex: male Admit Date: 2019 
 
1 Day Post-Op Procedure:  Procedure(s): LAPAROSCOPIC UMBILICAL HERNIA REPAIR, POSSIBLE OPEN Subjective: No acute surgical issues. Pt tolerating diet without nausea or vomiting. He does report some incisional pain. Objective:  
 
Visit Vitals /79 (BP 1 Location: Right arm, BP Patient Position: At rest) Pulse 67 Temp 98.9 °F (37.2 °C) Resp 16 Ht 5' 9\" (1.753 m) Wt 219 lb 11.2 oz (99.7 kg) SpO2 97% BMI 32.44 kg/m² Temp (24hrs), Av.4 °F (36.9 °C), Min:98 °F (36.7 °C), Max:99 °F (37.2 °C) Physical Exam:   
Gen:  NAD Pulm:  Unlabored Abd:  S/ND/appropriate TTP Wound:  C/D/I Recent Results (from the past 24 hour(s)) GLUCOSE, POC Collection Time: 19 12:45 PM  
Result Value Ref Range Glucose (POC) 120 (H) 65 - 100 mg/dL Performed by Ainsley Montano, POC Collection Time: 19  4:03 PM  
Result Value Ref Range Glucose (POC) 137 (H) 65 - 100 mg/dL Performed by Benita Miner METABOLIC PANEL, COMPREHENSIVE Collection Time: 19  4:06 AM  
Result Value Ref Range Sodium 137 136 - 145 mmol/L Potassium 4.0 3.5 - 5.1 mmol/L Chloride 103 97 - 108 mmol/L  
 CO2 24 21 - 32 mmol/L Anion gap 10 5 - 15 mmol/L Glucose 126 (H) 65 - 100 mg/dL BUN 12 6 - 20 MG/DL Creatinine 0.76 0.70 - 1.30 MG/DL  
 BUN/Creatinine ratio 16 12 - 20 GFR est AA >60 >60 ml/min/1.73m2 GFR est non-AA >60 >60 ml/min/1.73m2 Calcium 9.3 8.5 - 10.1 MG/DL Bilirubin, total 1.5 (H) 0.2 - 1.0 MG/DL  
 ALT (SGPT) 104 (H) 12 - 78 U/L  
 AST (SGOT) 100 (H) 15 - 37 U/L Alk. phosphatase 106 45 - 117 U/L Protein, total 8.5 (H) 6.4 - 8.2 g/dL Albumin 3.6 3.5 - 5.0 g/dL Globulin 4.9 (H) 2.0 - 4.0 g/dL  A-G Ratio 0.7 (L) 1.1 - 2.2    
CBC W/O DIFF  
 Collection Time: 01/12/19  4:06 AM  
Result Value Ref Range WBC 11.9 (H) 4.1 - 11.1 K/uL  
 RBC 4.44 4.10 - 5.70 M/uL  
 HGB 14.4 12.1 - 17.0 g/dL HCT 42.3 36.6 - 50.3 % MCV 95.3 80.0 - 99.0 FL  
 MCH 32.4 26.0 - 34.0 PG  
 MCHC 34.0 30.0 - 36.5 g/dL  
 RDW 11.5 11.5 - 14.5 % PLATELET 165 368 - 090 K/uL MPV 10.6 8.9 - 12.9 FL  
 NRBC 0.0 0  WBC ABSOLUTE NRBC 0.00 0.00 - 0.01 K/uL Assessment:  
 
Hospital Problems  Date Reviewed: 12/20/2018 Codes Class Noted POA * (Principal) Ventral hernia ICD-10-CM: K43.9 ICD-9-CM: 553.20  1/11/2019 Unknown Umbilical hernia XDV-75-YJ: K42.9 ICD-9-CM: 553.1  1/11/2019 Unknown Cirrhosis ([facility] [address] ICD-10-CM: K74.60 ICD-9-CM: 571.5  11/14/2017 Yes Overview Signed 9/7/2018 10:15 AM by MD Dr. Fidel Morris Plan/Recommendations/Medical Decision Making: - Diet as tolerated - Pain control - Plan DC home mid day 
- follow-up with Dr. Cande Sánchez within 2 weeks Signed By: Norman Crespo MD   
 January 12, 2019

## 2019-01-12 NOTE — PROGRESS NOTES
Primary Nurse Lyndsey Niño RN and Michelle Sparks RN performed a dual skin assessment on this patient. No impairment noted. Esteban score is 22.

## 2019-01-12 NOTE — PROGRESS NOTES
Problem: Falls - Risk of 
Goal: *Absence of Falls Document Bing Ahuja Fall Risk and appropriate interventions in the flowsheet. Outcome: Progressing Towards Goal 
Fall Risk Interventions: 
  
 
  
 
Medication Interventions: Patient to call before getting OOB, Teach patient to arise slowly Problem: Pressure Injury - Risk of 
Goal: *Prevention of pressure injury Document Esteban Scale and appropriate interventions in the flowsheet. Outcome: Progressing Towards Goal 
Pressure Injury Interventions: Activity Interventions: Increase time out of bed, Pressure redistribution bed/mattress(bed type) Nutrition Interventions: Document food/fluid/supplement intake, Offer support with meals,snacks and hydration

## 2019-01-18 ENCOUNTER — DOCUMENTATION ONLY (OUTPATIENT)
Dept: SURGERY | Age: 44
End: 2019-01-18

## 2019-01-18 ENCOUNTER — TELEPHONE (OUTPATIENT)
Dept: SURGERY | Age: 44
End: 2019-01-18

## 2019-01-18 NOTE — TELEPHONE ENCOUNTER
Patient came into the office to check on his [place] paperwork. He stated [employer] needed an update and faxed something over. Advised Patient I have not heard from [employer], however, I will give them a call and see what is needed. I left a message for Nahid dahl/[employer] to fax over any requests. Office and fax number left on voicemail.

## 2019-01-18 NOTE — PROGRESS NOTES
Received Medical Clarification form from Jarret dahl[employer]. Form completed and faxed to requested # [phone]. Copy to be scanned.

## 2019-01-23 DIAGNOSIS — I10 ESSENTIAL HYPERTENSION: ICD-10-CM

## 2019-01-23 RX ORDER — AMLODIPINE BESYLATE 5 MG/1
TABLET ORAL
Qty: 90 TAB | Refills: 1 | Status: SHIPPED | OUTPATIENT
Start: 2019-01-23 | End: 2019-04-17 | Stop reason: SDUPTHER

## 2019-01-23 NOTE — TELEPHONE ENCOUNTER
Last visit noted, labs checked and refill deemed appropriate at this time. Verbal refill order authorized by covering physicians at [facility] [[email or web address]] for Dr. Yen Cabezas during his absence.     Anoop Johnson, MorisD, BCPS, CDE

## 2019-01-29 ENCOUNTER — OFFICE VISIT (OUTPATIENT)
Dept: SURGERY | Age: 44
End: 2019-01-29

## 2019-01-29 VITALS
RESPIRATION RATE: 18 BRPM | OXYGEN SATURATION: 97 % | HEART RATE: 82 BPM | TEMPERATURE: 98.2 F | BODY MASS INDEX: 32.44 KG/M2 | DIASTOLIC BLOOD PRESSURE: 86 MMHG | HEIGHT: 69 IN | SYSTOLIC BLOOD PRESSURE: 138 MMHG | WEIGHT: 219 LBS

## 2019-01-29 DIAGNOSIS — K42.9 UMBILICAL HERNIA WITHOUT OBSTRUCTION AND WITHOUT GANGRENE: Primary | ICD-10-CM

## 2019-01-29 NOTE — LETTER
NOTIFICATION OF RETURN TO WORK / SCHOOL 
1/29/2019 Mr. Edyta Bergeron [address] [phone] To Whom It May Concern: 
 
Edyta Bergeron was under the care of [facility]. He will return to work on 2/11/2019 with no restrictions. If there are questions or concerns please have the patient contact our office.  
 
 
 
Sincerely, 
 
 
Francois Mendes MD

## 2019-01-29 NOTE — PROGRESS NOTES
1. Have you been to the ER, urgent care clinic since your last visit? Hospitalized since your last visit? No 
 
2. Have you seen or consulted any other health care providers outside of the [address] [place] [address] since your last visit? Include any pap smears or colon screening.  No

## 2019-02-04 ENCOUNTER — TELEPHONE (OUTPATIENT)
Dept: SURGERY | Age: 44
End: 2019-02-04

## 2019-02-05 ENCOUNTER — DOCUMENTATION ONLY (OUTPATIENT)
Dept: SURGERY | Age: 44
End: 2019-02-05

## 2019-02-05 NOTE — TELEPHONE ENCOUNTER
HIPPA verified. Patient stated he wanted to be able to return back to work on 2/18/2019 instead of 2/11/2019 to give him extra recovery time. I told patient per Dr. Ken Delatorre it was ok. I will send letter to employer. Patient expressed understanding.

## 2019-02-07 ENCOUNTER — OFFICE VISIT (OUTPATIENT)
Dept: HEMATOLOGY | Age: 44
End: 2019-02-07

## 2019-02-07 VITALS
HEART RATE: 92 BPM | DIASTOLIC BLOOD PRESSURE: 82 MMHG | SYSTOLIC BLOOD PRESSURE: 131 MMHG | TEMPERATURE: 99.8 F | WEIGHT: 218.4 LBS | OXYGEN SATURATION: 97 % | BODY MASS INDEX: 32.35 KG/M2 | HEIGHT: 69 IN

## 2019-02-07 DIAGNOSIS — K74.60 CIRRHOSIS OF LIVER WITHOUT ASCITES, UNSPECIFIED HEPATIC CIRRHOSIS TYPE (HCC): Primary | ICD-10-CM

## 2019-02-07 NOTE — PROGRESS NOTES
70 Mattie Irving MD, [address], [place], [place]       Ines Salazar, DILAN Villarreal, MAXIMUS Rogers, ACNP-BC   Yolanda Johnson, DILAN Givens, DILAN Gentile [facility] [address]    at [address], [address] [place] [address], [place]  22.    [phone]    FAX: 126 [facility]    at [address], [address], [address]    [phone]    FAX: [phone]     Patient Care Team:  Muriel Carcamo MD as PCP - General (Internal Medicine)  Darylene Standing., MD (Surgery)    Problem List  Date Reviewed: 12/20/2018          Codes Class Noted    Ventral hernia ICD-10-CM: K43.9  ICD-9-CM: 553.20  7/89/0239        Umbilical hernia NGM-10-EH: K42.9  ICD-9-CM: 553.1  1/11/2019        S/P laparoscopy ICD-10-CM: I99.164  ICD-9-CM: V45.89  12/4/2017    Overview Signed 12/4/2017 10:19 AM by Darylene Standing., MD     Diagnostic, core needle biopsy of the liver. Cirrhosis ([facility] ICD-10-CM: K74.60  ICD-9-CM: 571.5  11/14/2017    Overview Signed 9/7/2018 10:15 AM by MD Dr. Reji Matta Zhane             Ascites ICD-10-CM: R18.8  ICD-9-CM: 789.59  11/14/2017        Obesity (BMI 30.0-34.9) ICD-10-CM: T95.9  ICD-9-CM: 278.00  11/6/2017        HTN (hypertension) ICD-10-CM: I10  ICD-9-CM: 401.9  11/6/2017        Incarcerated umbilical hernia KWD-26-HE: K42.0  ICD-9-CM: 552.1  11/6/2017    Overview Signed 11/6/2017 10:19 AM by Darylene Standing., MD     Without gangrene or obstruction.              Psoriasis (Chronic) ICD-10-CM: L40.9  ICD-9-CM: 696.1  10/27/2017    Overview Signed 10/27/2017  2:33 PM by MD Dr. Mechelle Matta returns to the [place] [address] for management of cirrhosis of unclear etiology. The active problem list, all pertinent past medical history, medications, liver histology, radiologic findings and laboratory findings related to the liver disorder were reviewed with the patient. The patient is a 41 y.o. black male who was found to have chronic liver disease and cirrhosis in 11/20017 when he underwent an attempt at lap umbilical hernia repair. The surgery was aborted after discovering cirrhosis. The patient has developed the following complications of cirrhosis: ascites (resolved). Since the last office appointment the patient has:  Had his hernia repaired. He notes pain in between the 2 left sided lap sites. The patient has not experienced fatigue, pain in the right side over the liver, nausea, vomiting, return of lower extremity edema or ascites. The patient completes all daily activities without any functional limitations. ASSESSMENT AND PLAN:    Cirrhosis   The diagnosis of cirrhosis is based upon liver biopsy. The etiology for cirrhosis is unclear. Serologic testing for causes of chronic liver disease were positive for ASMA   However the biopsy does not have any features consistent with an autoimmune hepatitis. The patient used to consume up to 6 alcoholic beverages daily. He also admitted today a few years ago he drank very heavily. The etiology for cirrhosis may be alcohol. The CTP is 5. Child class A. The MELD score is 13. Ascites   Ascites first appeared in 11/2017. This was found at the time of laparoscopic surgery in 11/2017 and was minimal.  He did fine off of his diuretics. He was started on 1/2 step prior to his surgery. He can try stopping them at this time. He will call me if he needs to restart them. The patient was counseled regarding the need to maintain sodium restriction and the types of foods containing high amounts of sodium to be avoided.     Esophageal varices   The patient has esophageal varices without prior bleeding. Varices have treated with banding. The last EGD to assess for varices was performed 11/23/2018. Next due in 1 year, 11/2019. Hepatic encephalopathy   This has not developed to date. There is no need for treatment with lactulose and/or Xifaxan at this time. No need to restrict dietary protein at this time. Screening for hepatocellular carcinoma  HCC screening has recently been performed and does not suggest [facility] The next liver imaging study will be performed in 6/2019. Next AFP due in 6/2019. Treatment of other medical problems in patients with chronic liver disease  There are no contraindications for the patient to take any medications that are necessary for treatment of other medical issues. The patient has cirrhosis. Patients with cirrhosis should not use NSAIDs if possible as this is associated with a higher rate of LEXIE. Counseling for alcohol in patients with chronic liver disease  The patient has cirrhosis and was advised to be abstinent from all alcohol including non-alcoholic beer which does contain some alcohol. The patient has not had alcohol since the last appointment in 6/2018. Osteoporosis  The risk of osteoporosis is increased in patients with cirrhosis. DEXA bone density to assess for osteoporosis has not been performed. Vaccinations   Vaccination for viral hepatitis A and B is recommended since the patient has no serologic evidence of previous exposure or vaccination with immunity. Routine vaccinations against other bacterial and viral agents can be performed as indicated. Annual flu vaccination should be administered if indicated. ALLERGIES  No Known Allergies     MEDICATIONS  Current Outpatient Medications   Medication Sig    amLODIPine (NORVASC) 5 mg tablet TAKE 1 TABLET BY MOUTH DAILY.  oxyCODONE IR (ROXICODONE) 5 mg immediate release tablet Take 1 Tab by mouth every four (4) hours as needed for Pain.  Max Daily Amount: 30 mg.   [address] [place] [address] docusate sodium (COLACE) 100 mg capsule Take 1 Cap by mouth two (2) times daily as needed for Constipation.  BYSTOLIC 10 mg tablet TAKE 1 TABLET BY MOUTH EVERY DAY    spironolactone (ALDACTONE) 50 mg tablet Take 1 Tab by mouth daily.  furosemide (LASIX) 20 mg tablet Take 1 Tab by mouth daily. No current facility-administered medications for this visit. SYSTEM REVIEW NOT RELATED TO LIVER DISEASE OR REVIEWED ABOVE:  Constitution systems: Negative for fever, chills, weight gain, weight loss. Eyes: Negative for visual changes. ENT: Negative for sore throat, painful swallowing. Respiratory: Negative for cough, hemoptysis, SOB. Cardiology: Negative for chest pain, palpitations. GI:  Negative for constipation or diarrhea. : Negative for urinary frequency, dysuria, hematuria, nocturia. Skin: Negative for rash. Hematology: Negative for easy bruising, blood clots. Musculo-skeletal: Negative for back pain, muscle pain, weakness. Neurologic: Negative for headaches, dizziness, vertigo, memory problems not related to HE. Psychology: Negative for anxiety, depression. FAMILY HISTORY:  The father  of a heart attack. The mother  of cancer. There is no family history of liver disease. SOCIAL HISTORY:  The patient has never been . The patient has no children. The patient smokes cigars on the weekends. The patient drinks about 2 beers a night. He has been abstinent since 2018. The patient currently works full time at [employer] as a . PHYSICAL EXAMINATION:  Visit Vitals  Ht 5' 9\" (1.753 m)   Wt 218 lb 6.4 oz (99.1 kg)   BMI 32.25 kg/m²     General: No acute distress. Eyes: Sclera anicteric. ENT: No oral lesions. Nodes: No adenopathy. Skin: No spider angiomata. No jaundice. No palmar erythema. Respiratory: Lungs clear to auscultation. Cardiovascular: Regular heart rate. No murmurs. No JVD. Abdomen: Soft non-tender.   Liver size normal to percussion/palpation. Spleen not palpable. No obvious ascites. Lap scars. Extremities: No edema. No muscle wasting. No gross arthritic changes. Neurologic: Alert and oriented. Cranial nerves grossly intact. No asterixis. LABORATORY STUDIES:  Liver [place] [facility] [address] & Units 1/12/2019 1/4/2019 12/4/2018   WBC 4.1 - 11.1 K/uL 11.9 (H) 7.6 7.9   ANC 1.4 - 7.0 x10E3/uL   4.0   HGB 12.1 - 17.0 g/dL 14.4 15.8 15.7    - 400 K/uL 193 178 229   INR 0.9 - 1.1    1.1 1.1   AST 15 - 37 U/L 100 (H) 124 (H) 103 (H)   ALT 12 - 78 U/L 104 (H) 100 (H) 81 (H)   Alk Phos 45 - 117 U/L 106 130 (H) 125 (H)   Bili, Total 0.2 - 1.0 MG/DL 1.5 (H) 1.4 (H) 0.8   Bili, Direct 0.00 - 0.40 mg/dL   0.31   Albumin 3.5 - 5.0 g/dL 3.6 4.1 4.6   BUN 6 - 20 MG/DL 12 9 6   Creat 0.70 - 1.30 MG/DL 0.76 0.76 0.72 (L)   Na 136 - 145 mmol/L 137 135 (L) 139   K 3.5 - 5.1 mmol/L 4.0 4.1 4.6   Cl 97 - 108 mmol/L 103 103 99   CO2 21 - 32 mmol/L 24 26 24   Glucose 65 - 100 mg/dL 126 (H) 138 (H) 139 (H)     SEROLOGIES:  Serologies Latest Ref Rng & Units 3/2/2018   Hep A Ab, Total Negative Negative   Hep B Surface Ag Negative Negative   Hep B Surface AB QL  Non Reactive   Hep C Ab 0.0 - 0.9 s/co ratio <0.1   Ferritin 30 - 400 ng/mL 267   Iron % Saturation 15 - 55 % 36   CHRISTI Ab, Direct Negative Negative   C-ANCA Neg:<1:20 titer <1:20   P-ANCA Neg:<1:20 titer <1:20   ANCA Neg:<1:20 titer <1:20   ASMCA 0 - 19 Units 27 (H)   M2 Ab 0.0 - 20.0 Units 12.3   Ceruloplasmin 16.0 - 31.0 mg/dL 33.6 (H)   Alpha-1 antitrypsin level 90 - 200 mg/dL 173     LIVER HISTOLOGY:  11/2017. Slides reviewed by MLS. Cirrhosis. No steatosis. Mild focal non-specific inflammation adjacent to broad bands of cirrhosis. 4/2018. FibroScan performed at [address]. EkPa was 48.0. IQR/med 20%. The results suggested a fibrosis level of F4. CAP is 282, consistent with fatty liver disease. ENDOSCOPIC PROCEDURES:  11/2018. EGD by MLS.  Only mild portal hypertensive gastropathy of the body of the stomach. Gastritis of the antrum. No varices. Repeat in 11/2019.    6/2018. EGD performed by MLS. Large esophageal varices. Banding performed. No gastric varices. Mild portal gastropathy. RADIOLOGY:  4/2018. Abdominal ultrasound. Liver is normal. No lesions or masses. OTHER TESTING:  Not available or performed    FOLLOW-UP:  All of the issues listed above in the assessment and plan were discussed with the patient. All questions were answered. The patient expressed a clear understanding of the above. [address] [address] in 4 months for follow up.     ELOISE Owens-BC  [facility] [place] [facility] [address] [place], [address] [place]  22.  [phone]

## 2019-02-07 NOTE — PROGRESS NOTES
Chief Complaint   Patient presents with    Follow-up     Visit Vitals  /82 (BP 1 Location: Left arm, BP Patient Position: Sitting)   Pulse 92   Temp 99.8 °F (37.7 °C) (Tympanic)   Ht 5' 9\" (1.753 m)   Wt 218 lb 6.4 oz (99.1 kg)   SpO2 97%   BMI 32.25 kg/m²     PHQ over the last two weeks 2/7/2019   Little interest or pleasure in doing things Not at all   Feeling down, depressed, irritable, or hopeless Not at all   Total Score PHQ 2 0     1. Have you been to the ER, urgent care clinic since your last visit? Hospitalized since your last visit? Hernia repair 2/11/19. At [facility]    2. Have you seen or consulted any other health care providers outside of the [address] [place] [address] since your last visit? Include any pap smears or colon screening.  No n

## 2019-02-08 LAB
ALBUMIN SERPL-MCNC: 4.3 G/DL (ref 3.5–5.5)
ALP SERPL-CCNC: 131 IU/L (ref 39–117)
ALT SERPL-CCNC: 25 IU/L (ref 0–44)
AST SERPL-CCNC: 45 IU/L (ref 0–40)
BILIRUB DIRECT SERPL-MCNC: 0.25 MG/DL (ref 0–0.4)
BILIRUB SERPL-MCNC: 0.7 MG/DL (ref 0–1.2)
BUN SERPL-MCNC: 6 MG/DL (ref 6–24)
BUN/CREAT SERPL: 8 (ref 9–20)
CALCIUM SERPL-MCNC: 9.4 MG/DL (ref 8.7–10.2)
CHLORIDE SERPL-SCNC: 100 MMOL/L (ref 96–106)
CO2 SERPL-SCNC: 24 MMOL/L (ref 20–29)
CREAT SERPL-MCNC: 0.8 MG/DL (ref 0.76–1.27)
ERYTHROCYTE [DISTWIDTH] IN BLOOD BY AUTOMATED COUNT: 12.8 % (ref 12.3–15.4)
GLUCOSE SERPL-MCNC: 163 MG/DL (ref 65–99)
HCT VFR BLD AUTO: 43 % (ref 37.5–51)
HGB BLD-MCNC: 14.5 G/DL (ref 13–17.7)
INR PPP: 1.1 (ref 0.8–1.2)
MCH RBC QN AUTO: 32.3 PG (ref 26.6–33)
MCHC RBC AUTO-ENTMCNC: 33.7 G/DL (ref 31.5–35.7)
MCV RBC AUTO: 96 FL (ref 79–97)
PLATELET # BLD AUTO: 235 X10E3/UL (ref 150–379)
POTASSIUM SERPL-SCNC: 4.1 MMOL/L (ref 3.5–5.2)
PROT SERPL-MCNC: 8.5 G/DL (ref 6–8.5)
PROTHROMBIN TIME: 11.5 SEC (ref 9.1–12)
RBC # BLD AUTO: 4.49 X10E6/UL (ref 4.14–5.8)
SODIUM SERPL-SCNC: 141 MMOL/L (ref 134–144)
WBC # BLD AUTO: 12.3 X10E3/UL (ref 3.4–10.8)

## 2019-02-13 NOTE — PROGRESS NOTES
[facility] Note - Follow up Subjective Spencer Kearney returns for scheduled follow up today. He is s/p laparoscopic repair of an umbilical hernia with revision of his periumbilical skin on 4/26/32. He was monitored overnight in the hospital after this due to his cirrhosis. He has had an uneventful post-operative course. He has some soreness still but otherwise is doing well. Objective Visit Vitals /86 (BP 1 Location: Right arm, BP Patient Position: Sitting) Pulse 82 Temp 98.2 °F (36.8 °C) (Oral) Resp 18 Ht 5' 9\" (1.753 m) Wt 219 lb (99.3 kg) SpO2 97% BMI 32.34 kg/m² PE 
GEN - Awake, alert, communicating appropriately. NAD Pulm - CTAB 
CV - RRR Abd - soft, ND, appropriately tender. Incisions c/d/i, no signs of infection. No obvious ascites. Ext - warm, well perfused. Labs None Assessment Spencer Kearney is a 41 y. o.yr old male with history of cirrhosis who is s/p laparoscopic repair of an umbilical hernia with revision of his periumbilical skin on 1/40/35. Plan He is doing well. I will release him to return to work on 2/11/19. I will see him back as needed in the future. He may slowly increase his activity levels over the next 2-4 weeks. Pat Silvestre MD 
1/29/19 CC: MD Dr. Ron Bolanos

## 2019-04-17 ENCOUNTER — OFFICE VISIT (OUTPATIENT)
Dept: FAMILY MEDICINE CLINIC | Age: 44
End: 2019-04-17

## 2019-04-17 VITALS
BODY MASS INDEX: 33.33 KG/M2 | HEART RATE: 85 BPM | DIASTOLIC BLOOD PRESSURE: 94 MMHG | HEIGHT: 69 IN | RESPIRATION RATE: 16 BRPM | WEIGHT: 225 LBS | OXYGEN SATURATION: 95 % | SYSTOLIC BLOOD PRESSURE: 150 MMHG | TEMPERATURE: 98.7 F

## 2019-04-17 DIAGNOSIS — E11.9 CONTROLLED TYPE 2 DIABETES MELLITUS WITHOUT COMPLICATION, WITHOUT LONG-TERM CURRENT USE OF INSULIN (HCC): Primary | ICD-10-CM

## 2019-04-17 DIAGNOSIS — I10 ESSENTIAL HYPERTENSION: ICD-10-CM

## 2019-04-17 RX ORDER — AMLODIPINE BESYLATE 5 MG/1
TABLET ORAL
Qty: 90 TAB | Refills: 1 | Status: SHIPPED | OUTPATIENT
Start: 2019-04-17

## 2019-04-17 RX ORDER — AMLODIPINE BESYLATE 5 MG/1
TABLET ORAL
COMMUNITY
Start: 2017-11-28 | End: 2019-04-17 | Stop reason: SDUPTHER

## 2019-04-17 NOTE — PROGRESS NOTES
Elida Hu is a 41 y.o. male Chief Complaint Patient presents with  New Patient  Medication Refill Amlodipine 1. Have you been to the ER, urgent care clinic since your last visit? Hospitalized since your last visit? 1/11/19   Hernia surgery 2. Have you seen or consulted any other health care providers outside of the [address] [place] [address] since your last visit? Include any pap smears or colon screening.   no

## 2019-04-17 NOTE — PROGRESS NOTES
Assessment and Plan 1. Controlled type 2 diabetes mellitus without complication, without long-term current use of insulin ([address] Diet only, complicating factor of liver disease Diet handouts given. NO sugar, Low starch diet - METABOLIC PANEL, BASIC 
- MICROALBUMIN, UR, RAND W/ MICROALB/CREAT RATIO 
- HEMOGLOBIN A1C WITH EAG 2. Essential hypertension Not at goal but off of amlodipine 
- amLODIPine (NORVASC) 5 mg tablet; TAKE 1 TABLET BY MOUTH DAILY. Dispense: 90 Tab; Refill: 1 Follow-up and Dispositions · Return in about 1 month (around 5/17/2019) for Blood pressure follow up. Diagnosis and plan discussed with patient who verbillized understanding. History of present Jimmy Chaparro is a 41 y.o. male presenting for New Patient and Medication Refill (Amlodipine ) History of Hypertension Supposed to be on Bystolic and amlodipine. Ran out of amlodipine so currently off of it History of cirrhosis, sees Dr. Levon Deshpande Attributed to ETOH, patient states currently ETOH and drug free Type 2 DM Diet only Previously on metformin Has a problem eating starch. Review of Systems Respiratory: Negative. Cardiovascular: Negative. Gastrointestinal: Negative. Genitourinary: Negative. Past Medical History:  
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   
 cirrhosis  Obesity (BMI 30.0-34. 9)  Psoriasis Past Surgical History:  
Procedure Laterality Date [address] [place] [address] UNLISTED  2017 Diagnostic laparoscopy, core needle biopsy of the liver  HX GI  2018 ENDOSCOPY  
 HX HERNIA REPAIR  01/11/2019 Lap umbilical hernia repair Dr. Ivan Avilez Family History Problem Relation Age of Onset  Cancer Mother Stomach  Diabetes Father  Diabetes Brother  Anesth Problems Neg Hx Social History Socioeconomic History  Marital status: SINGLE Spouse name: Not on file  Number of children: Not on file  Years of education: Not on file  Highest education level: Not on file Occupational History  Not on file Social Needs  Financial resource strain: Not on file  Food insecurity:  
  Worry: Not on file Inability: Not on file  Transportation needs:  
  Medical: Not on file Non-medical: Not on file Tobacco Use  Smoking status: Light Tobacco Smoker Types: Cigars  Smokeless tobacco: Never Used  Tobacco comment: x1 week - cigar smoker Substance and Sexual Activity  Alcohol use: No  
  Alcohol/week: 1.2 oz Types: 2 Cans of beer per week Frequency: Never Comment: No drink since 2018.  24 ounce beer per week/2 beers per week now per pt on 6/7/2018/none now per pt on 11/21/2018  Drug use: No  
 Sexual activity: Yes  
  Partners: Female Lifestyle  Physical activity:  
  Days per week: Not on file Minutes per session: Not on file  Stress: Not on file Relationships  Social connections:  
  Talks on phone: Not on file Gets together: Not on file Attends [employer] service: Not on file Active member of club or organization: Not on file Attends meetings of clubs or organizations: Not on file Relationship status: Not on file  Intimate partner violence:  
  Fear of current or ex partner: Not on file Emotionally abused: Not on file Physically abused: Not on file Forced sexual activity: Not on file Other Topics Concern  Not on file Social History Narrative  Not on file Current Outpatient Medications Medication Sig Dispense Refill  amLODIPine (NORVASC) 5 mg tablet TAKE 1 TABLET BY MOUTH DAILY. 90 Tab 1  
 BYSTOLIC 10 mg tablet TAKE 1 TABLET BY MOUTH EVERY DAY 30 Tab 5  
 docusate sodium (COLACE) 100 mg capsule Take 1 Cap by mouth two (2) times daily as needed for Constipation.  30 Cap 0  
 Allergies Allergen Reactions  Metformin Other (comments) Cannot tolerate pill size Other reaction(s): Other (comments) Cannot tolerate pill size Vitals:  
 04/17/19 8021 BP: (!) 150/94 Pulse: 85 Resp: 16 Temp: 98.7 °F (37.1 °C) TempSrc: Oral  
SpO2: 95% Weight: 225 lb (102.1 kg) Height: 5' 9\" (1.753 m) Body mass index is 33.23 kg/m². Objective General: Patient alert and oriented and in NAD Eyes: PER/EOMI, no conjunctival pallor or scleral icterus. ENT: large parotids Neck: No thyromegaly or cervical lymphadenopathy Cardiovascular: Heart has regular rate and rhythm, No murmurs, rubs or gallops. No edema Respiratory: Lungs are clear to auscultation bilaterally, no wheezing, rales or rhonchi, normal chest excursion and no increased work of breathing. Gastorintestinal: abdomen is non-tender, non-distended, without organomegaly or masses + gynecomastia bilaterally Musculoskeletal: All four extremities present and functional.  
Skin: No rashes or lesions noted on exposed skin Neuro: AAOx3, normal gait and speech. No gross neurologic deficits. Psych: Appropriate mood and affect, no homicidal or suicidal ideation, no obsessions, delusions or hallucinations, normal psychomotor status.

## 2019-04-17 NOTE — PATIENT INSTRUCTIONS
Diabetes: 
Blood sugar goals: 
Hemoglobin A1c under 7 Fasting blood sugar  Blood sugar 2 hours after a meal under 180, 4 hours after a meal under 120 No hypoglycemia (sugars under 70 and symptomatic low sugars) Blood sugar control with diet and exercise: 
Exercise 45 minutes per day. This makes your insulin work better. It also allows insulin levels to fall helping with weight loss. Every night, try to fast from your evening meal to breakfast (at least 12 hours) without eating anything. This uses stored energy in your liver and makes insulin work better. Avoid simples sugars such as table sugar in drinks (sodas, lemonade, sweet tea, wine), desserts, candy. Also avoid fruit juices and high fructose corn syrup. Finally, drink less milk which has milk sugar in it. Control your starch intake. Men should have 3-4 carb portions per meal.  Women should have 2-3 carb portions per meal.  A carb serving is the equivalent of a slice of bread. Control your blood pressure and cholesterol. These problems are common in diabetes. AND, don't smoke. All of these problems contribute to heart disease and stroke risk. Get a yearly eye exam and flu shot. Make sure your vaccines are up to date particularly the pneumonia vaccines. Inspect your feet daily. Wear comfortable protective shoes and clean socks. Seek medical care if there are sore, calluses or numbness and burning of your feet. See your doctor at least every 6 months if you are on oral medicines or more often if the diabetic control is not at goal or if you are on insulin. Take your medicines religiously. STOP the SUGAR The first step in dietary efforts at weight loss is removing as much sugar from the diet as possible. Most dietary sugar is in the forms of table sugar (sucrose), fruit sugar (fructose) and milk sugar (lactose).   But, as the picture above demonstrates, you need to watch labels to see if processed foods have added sugars under other names. To eliminate sucrose, eliminate sweet drinks entirely including soft drinks, sports drinks, lemonade, sweet tea and wine. Also, eliminate candy and make desserts a \"special occasion only treat\". Don't eat desserts with every meal or every night. Most fructose is found in fruit and this should be markedly limited. Fruit juice should be avoided. One piece of fruit daily is the limit. Berries are a good choice to eat as a garnish for other foods. Bananas and grapes should be avoided. Milk sugar, or lactose, should be avoided as well. Milk is a good source of calcium but not for people struggling with weight issues. Put a little milk in your coffee or tea but otherwise avoid milk. How can you avoid sugar? For starters, don't buy it and don't bring it in the house. It won't tempt you that way! For more information: 
 
Try the internet for videos about sugar addiction or try diet doctor.com. Carb Counting in Diabetes Carbohydrate or carb counting is a method of calculating grams of carbohydrate consumed at meals and snacks. Foods that contain carbohydrate have the greatest effect on blood sugar compared to foods that contain protein or fat. A low carb diet has the greatest likelihood of promoting weight loss. What Foods Have Carbohydrate? Foods that contain carbohydrate or carbs are: 
 
-grains like wheat, rice, oatmeal, and barley 
-grain-based foods like bread, cereal, pasta, and crackers 
-starchy vegetables like potatoes, peas and corn 
-fruit and juice 
-milk and yogurt 
-dried beans and peas and soy products like veggie burgers 
-sweets and snack foods like sodas, juice drinks, cake, cookies, candy, and chips Non-starchy vegetables like lettuce, cucumbers, broccoli, and cauliflower have very little carbohydrate and minimal impact on your blood glucose. Carbohydrate Servings -In meal planning, 1 serving of a food with carbohydrate has about 15 grams of carbohydrate: 
-Check serving sizes with measuring cups and spoons or a food scale. -Read the Nutrition Facts on food labels to find out how many grams of carbohydrate are in foods you eat.  
-1 carb serving (15 grams) is roughly equal to one piece of bread How much carbohydrate should I eat? Diabetics are advised to eat: For women-30-45 grams or 2-3 carb servings per meal. 
For men-45-60 grams or 3-4 carb servings per meal. 
 
For weight loss, patients should try to eat under 100 grams of carbs per day. How do I \"manage\" carbs? 
 
-First, eliminate sugar in the form of soft drinks, candy and desserts. Minimize cakes, cookies, smoothies and juices. 
-Next, identify the carbs you are eating. Watch labels and know when you are eating a starch. Eat less bread, noodles, pasta, rice, potatoes, french fries, cereals, chips, crackers and yogurt 
-Eat more healthy proteins and fats with more eggs, full fat dairy products, non starchy vegetables, salads, meat, poultry, fish, cheese, berries, nuts, olive oil and butter. 
-Avoid beer. Europeans refer to beer as \"liquid bread\" and it is made from grains with a high carb content. Where can I find more information? There is a lot of information about carb counting on the internet and there are books about carb counting. Try the American Diabetes Association and Dietdoctor. com

## 2019-04-18 LAB
BUN SERPL-MCNC: 6 MG/DL (ref 6–24)
BUN/CREAT SERPL: 8 (ref 9–20)
CALCIUM SERPL-MCNC: 9.6 MG/DL (ref 8.7–10.2)
CHLORIDE SERPL-SCNC: 99 MMOL/L (ref 96–106)
CO2 SERPL-SCNC: 28 MMOL/L (ref 20–29)
CREAT SERPL-MCNC: 0.74 MG/DL (ref 0.76–1.27)
CREAT UR-MCNC: NORMAL MG/DL
EST. AVERAGE GLUCOSE BLD GHB EST-MCNC: 128 MG/DL
GLUCOSE SERPL-MCNC: 119 MG/DL (ref 65–99)
HBA1C MFR BLD: 6.1 % (ref 4.8–5.6)
MICROALBUMIN UR-MCNC: NORMAL
POTASSIUM SERPL-SCNC: 4.3 MMOL/L (ref 3.5–5.2)
SODIUM SERPL-SCNC: 139 MMOL/L (ref 134–144)

## 2019-05-16 ENCOUNTER — OFFICE VISIT (OUTPATIENT)
Dept: FAMILY MEDICINE CLINIC | Age: 44
End: 2019-05-16

## 2019-05-16 VITALS
HEART RATE: 85 BPM | RESPIRATION RATE: 16 BRPM | BODY MASS INDEX: 33.33 KG/M2 | DIASTOLIC BLOOD PRESSURE: 83 MMHG | SYSTOLIC BLOOD PRESSURE: 124 MMHG | OXYGEN SATURATION: 94 % | TEMPERATURE: 98 F | HEIGHT: 69 IN | WEIGHT: 225 LBS

## 2019-05-16 DIAGNOSIS — I10 ESSENTIAL HYPERTENSION: ICD-10-CM

## 2019-05-16 DIAGNOSIS — G47.33 OBSTRUCTIVE SLEEP APNEA SYNDROME: Primary | ICD-10-CM

## 2019-05-16 NOTE — PROGRESS NOTES
Assessment and Plan 1. Obstructive sleep apnea syndrome-likely Likely sleep apnea. Discussed need to work on weight and exercise and avoid ETOH (currently nondrinker) - SLEEP MEDICINE REFERRAL Dr. Domenica Walker 2. Essential hypertension Now back on amlodipine and at goal 
 
Follow-up and Dispositions · Return in about 6 months (around 11/16/2019) for Blood pressure follow up. Diagnosis and plan discussed with patient who verbillized understanding. History of present Basilio Gottlieb is a 45 y.o. male presenting for Follow-up (Blood Pressure ) and Fatigue Hypertension Now back on amlodipine BP at goal 
Taking consistently Fatigue Very sleepy during the day Sometimes when driving, has NOT fallen asleep in traffic Snores and wakes self up snoring NO ETOH (history of an issue with this see problem list) Unmarried so not much more history He was worried this is from blood sugar but only has mild metabolic syndrome. Diet again discussed with patient. Review of Systems Constitutional: Positive for malaise/fatigue. Negative for weight loss. Respiratory: Negative. Cardiovascular: Negative. Gastrointestinal: Negative. Genitourinary: Negative. Neurological: Negative. Psychiatric/Behavioral: The patient has insomnia. Past Medical History:  
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   
 cirrhosis  Obesity (BMI 30.0-34. 9)  Psoriasis Past Surgical History:  
Procedure Laterality Date [address] [place] [address] UNLISTED  2017 Diagnostic laparoscopy, core needle biopsy of the liver  HX GI  2018 ENDOSCOPY  
 HX HERNIA REPAIR  01/11/2019 Lap umbilical hernia repair Dr. Smith Captain Family History Problem Relation Age of Onset  Cancer Mother Stomach  Diabetes Father  Diabetes Brother  Anesth Problems Neg Hx Social History Socioeconomic History  Marital status: SINGLE Spouse name: Not on file  Number of children: Not on file  Years of education: Not on file  Highest education level: Not on file Occupational History  Not on file Social Needs  Financial resource strain: Not on file  Food insecurity:  
  Worry: Not on file Inability: Not on file  Transportation needs:  
  Medical: Not on file Non-medical: Not on file Tobacco Use  Smoking status: Light Tobacco Smoker Types: Cigars  Smokeless tobacco: Never Used  Tobacco comment: x1 week - cigar smoker Substance and Sexual Activity  Alcohol use: No  
  Alcohol/week: 1.2 oz Types: 2 Cans of beer per week Frequency: Never Comment: No drink since 2018.  24 ounce beer per week/2 beers per week now per pt on 6/7/2018/none now per pt on 11/21/2018  Drug use: No  
 Sexual activity: Yes  
  Partners: Female Lifestyle  Physical activity:  
  Days per week: Not on file Minutes per session: Not on file  Stress: Not on file Relationships  Social connections:  
  Talks on phone: Not on file Gets together: Not on file Attends Buddhist service: Not on file Active member of club or organization: Not on file Attends meetings of clubs or organizations: Not on file Relationship status: Not on file  Intimate partner violence:  
  Fear of current or ex partner: Not on file Emotionally abused: Not on file Physically abused: Not on file Forced sexual activity: Not on file Other Topics Concern  Not on file Social History Narrative  Not on file Current Outpatient Medications Medication Sig Dispense Refill  amLODIPine (NORVASC) 5 mg tablet TAKE 1 TABLET BY MOUTH DAILY.  90 Tab 1  
 BYSTOLIC 10 mg tablet TAKE 1 TABLET BY MOUTH EVERY DAY 30 Tab 5  
 docusate sodium (COLACE) 100 mg capsule Take 1 Cap by mouth two (2) times daily as needed for Constipation. 30 Cap 0 Allergies Allergen Reactions  Metformin Other (comments) Cannot tolerate pill size Other reaction(s): Other (comments) Cannot tolerate pill size Vitals:  
 05/16/19 9000 BP: 124/83 Pulse: 85 Resp: 16 Temp: 98 °F (36.7 °C) TempSrc: Oral  
SpO2: 94% Weight: 225 lb (102.1 kg) Height: 5' 9\" (1.753 m) Body mass index is 33.23 kg/m². Objective General: Patient alert and oriented and in NAD, overweight Neck: No thyromegaly or cervical lymphadenopathy Cardiovascular: Heart has regular rate and rhythm, No murmurs, rubs or gallops. No edema Respiratory: Lungs are clear to auscultation bilaterally, no wheezing, rales or rhonchi, normal chest excursion and no increased work of breathing. Musculoskeletal: All four extremities present and functional.  
Neuro: AAOx3, normal gait and speech. No gross neurologic deficits. Psych: Appropriate mood and affect, no homicidal or suicidal ideation, no obsessions, delusions or hallucinations, normal psychomotor status.

## 2019-05-16 NOTE — PROGRESS NOTES
Libia Golden is a 45 y.o. male Chief Complaint Patient presents with  Follow-up Blood Pressure  Fatigue 1. Have you been to the ER, urgent care clinic since your last visit? Hospitalized since your last visit?  no 
 
 
2. Have you seen or consulted any other health care providers outside of the [address] [place] [address] since your last visit? Include any pap smears or colon screening.   No

## 2019-06-24 ENCOUNTER — OFFICE VISIT (OUTPATIENT)
Dept: HEMATOLOGY | Age: 44
End: 2019-06-24

## 2019-06-24 VITALS
OXYGEN SATURATION: 96 % | WEIGHT: 221.6 LBS | DIASTOLIC BLOOD PRESSURE: 92 MMHG | SYSTOLIC BLOOD PRESSURE: 156 MMHG | TEMPERATURE: 100 F | HEART RATE: 83 BPM | BODY MASS INDEX: 32.72 KG/M2

## 2019-06-24 DIAGNOSIS — R18.8 CIRRHOSIS OF LIVER WITH ASCITES, UNSPECIFIED HEPATIC CIRRHOSIS TYPE (HCC): Primary | ICD-10-CM

## 2019-06-24 DIAGNOSIS — K74.60 CIRRHOSIS OF LIVER WITH ASCITES, UNSPECIFIED HEPATIC CIRRHOSIS TYPE (HCC): Primary | ICD-10-CM

## 2019-06-24 NOTE — PROGRESS NOTES
1. Have you been to the ER, urgent care clinic since your last visit? Hospitalized since your last visit? No    2. Have you seen or consulted any other health care providers outside of the [address] [place] [address] since your last visit? Include any pap smears or colon screening. No   Chief Complaint   Patient presents with    Follow-up     Visit Vitals  BP (!) 156/92 (BP 1 Location: Left arm, BP Patient Position: Sitting)   Pulse 83   Temp 100 °F (37.8 °C) (Tympanic)   Wt 221 lb 9.6 oz (100.5 kg)   SpO2 96%   BMI 32.72 kg/m²     3 most recent PHQ Screens 6/24/2019   Little interest or pleasure in doing things Not at all   Feeling down, depressed, irritable, or hopeless Not at all   Total Score PHQ 2 0     Learning Assessment 6/24/2019   PRIMARY LEARNER Patient   HIGHEST LEVEL OF EDUCATION - PRIMARY LEARNER  -   BARRIERS PRIMARY LEARNER NONE   CO-LEARNER CAREGIVER No   CO-LEARNER NAME no   PRIMARY LANGUAGE ENGLISH    NEED -   LEARNER PREFERENCE PRIMARY LISTENING   LEARNING SPECIAL TOPICS -   ANSWERED BY patient    RELATIONSHIP SELF     Abuse Screening Questionnaire 6/24/2019   Do you ever feel afraid of your partner? N   Are you in a relationship with someone who physically or mentally threatens you? N   Is it safe for you to go home? Y     Fall Risk Assessment, last 12 mths 6/24/2019   Able to walk? Yes   Fall in past 12 months?  No

## 2019-06-24 NOTE — PROGRESS NOTES
[address] [place] [address], Madie CONTRERAS Nechama Grammes, MD Ethelle Elbe, PA-C America Baumann, ACNP-BC     April S Jessica, [facility]   DILAN West NP [address] [facility] [address]    at [address], [address] Gala Daniels  22.    [phone]    FAX: [address], [address]    [phone]    FAX: [phone]       Patient Care Team:  Vika Moore MD as PCP - General (Family Practice)  Tracey Saxena MD (Surgery)    Problem List  Date Reviewed: 2/13/2019          Codes Class Noted    Ventral hernia ICD-10-CM: K43.9  ICD-9-CM: 553.20  2/88/6603        Umbilical hernia USJ-70-PQ: K42.9  ICD-9-CM: 553.1  1/11/2019        S/P laparoscopy ICD-10-CM: E50.504  ICD-9-CM: V45.89  12/4/2017    Overview Signed 12/4/2017 10:19 AM by Tracey Saxena MD     Diagnostic, core needle biopsy of the liver. Cirrhosis ([facility] [address] ICD-10-CM: K74.60  ICD-9-CM: 571.5  11/14/2017    Overview Signed 9/7/2018 10:15 AM by MD Dr. Jose Villegas             Ascites ICD-10-CM: R18.8  ICD-9-CM: 789.59  11/14/2017        Obesity (BMI 30.0-34.9) ICD-10-CM: R99.8  ICD-9-CM: 278.00  11/6/2017        HTN (hypertension) ICD-10-CM: I10  ICD-9-CM: 401.9  11/6/2017        Incarcerated umbilical hernia JNP-77-[id number]: K42.0  ICD-9-CM: 552.1  11/6/2017    Overview Signed 11/6/2017 10:19 AM by Tracey Saxena MD     Without gangrene or obstruction.              Psoriasis (Chronic) ICD-10-CM: L40.9  ICD-9-CM: 696.1  10/27/2017    Overview Signed 10/27/2017  2:33 PM by MD Dr. Ban Villegas returns to the [address] for management of cirrhosis of unclear etiology. The active problem list, all pertinent past medical history, medications, liver histology, radiologic findings and laboratory findings related to the liver disorder were reviewed with the patient. The patient is a 45 y.o. black male who was found to have chronic liver disease and cirrhosis in 11/2017 when he underwent an attempt at lap umbilical hernia repair. The surgery was aborted after discovering cirrhosis/ascites. The patient has developed the following complications of cirrhosis: ascites (resolved). He is no longer taking diuretics. The patient has not experienced fatigue, pain in the right side over the liver, nausea, vomiting, return of lower extremity edema or ascites. The patient completes all daily activities without any functional limitations. Since the last office appointment the patient has:  Had his hernia repaired 1/2019. He is pleased with this outcome and there has been no apparent return of ascites. ASSESSMENT AND PLAN:    Cirrhosis   The diagnosis of cirrhosis is based upon liver biopsy. The etiology for cirrhosis is unclear. Serologic testing for causes of chronic liver disease were positive for ASMA   However the biopsy does not have any features consistent with an autoimmune hepatitis. The patient used to consume up to 6 alcoholic beverages daily. He also admitted today a few years ago he drank very heavily. The etiology for cirrhosis may be alcohol. The CTP is 5. Child class A. The MELD score was last calculated at 7. Ascites   Ascites first appeared in 11/2017. This was found at the time of laparoscopic surgery in 11/2017 and was minimal.  He did fine off of his diuretics since 1/2019. He is maintaining sodium restricted diet as much as possible. Esophageal varices   The patient has esophageal varices without prior bleeding. Varices have treated with banding.   The last EGD to assess for varices was performed 11/23/2018. Next due in 1 year, 11/2019. This has been ordered. Hepatic encephalopathy   This has not developed to date. There is no need for treatment with lactulose and/or Xifaxan at this time. No need to restrict dietary protein at this time. Screening for hepatocellular carcinoma  HCC screening has recently been performed and does not suggest [facility] [address] [address] The next liver imaging study will be performed in 6/2019. Next AFP due in 6/2019. These have been ordered. Treatment of other medical problems in patients with chronic liver disease  There are no contraindications for the patient to take any medications that are necessary for treatment of other medical issues. The patient has cirrhosis. Patients with cirrhosis should not use NSAIDs if possible as this is associated with a higher rate of LEXIE. Counseling for alcohol in patients with chronic liver disease  The patient has cirrhosis and was advised to be abstinent from all alcohol including non-alcoholic beer which does contain some alcohol. The patient has not had alcohol since the last appointment in 6/2018. Osteoporosis  The risk of osteoporosis is increased in patients with cirrhosis. DEXA bone density to assess for osteoporosis has not been performed. Vaccinations   Vaccination for viral hepatitis A and B is recommended since the patient has no serologic evidence of previous exposure or vaccination with immunity. Routine vaccinations against other bacterial and viral agents can be performed as indicated. Annual flu vaccination should be administered if indicated. ALLERGIES  Allergies   Allergen Reactions    Metformin Other (comments)     Cannot tolerate pill size  Other reaction(s):  Other (comments)  Cannot tolerate pill size        MEDICATIONS  Current Outpatient Medications   Medication Sig    BYSTOLIC 10 mg tablet TAKE 1 TABLET BY MOUTH EVERY DAY    amLODIPine (NORVASC) 5 mg tablet TAKE 1 TABLET BY MOUTH DAILY.    docusate sodium (COLACE) 100 mg capsule Take 1 Cap by mouth two (2) times daily as needed for Constipation. No current facility-administered medications for this visit. SYSTEM REVIEW NOT RELATED TO LIVER DISEASE OR REVIEWED ABOVE:  Constitution systems: Negative for fever, chills, weight gain, weight loss. Eyes: Negative for visual changes. ENT: Negative for sore throat, painful swallowing. Respiratory: Negative for cough, hemoptysis, SOB. Cardiology: Negative for chest pain, palpitations. GI:  Negative for constipation or diarrhea. : Negative for urinary frequency, dysuria, hematuria, nocturia. Skin: Negative for rash. Hematology: Negative for easy bruising, blood clots. Musculo-skeletal: Negative for back pain, muscle pain, weakness. Neurologic: Negative for headaches, dizziness, vertigo, memory problems not related to HE. Psychology: Negative for anxiety, depression. FAMILY HISTORY:  The father  of a heart attack. The mother  of cancer. There is no family history of liver disease. SOCIAL HISTORY:  The patient has never been . The patient has no children. The patient smokes cigars on the weekends. The patient drinks about 2 beers a night. He has been abstinent since 2019. The patient currently works full time at [employer] as a . PHYSICAL EXAMINATION:  Visit Vitals  BP (!) 156/92 (BP 1 Location: Left arm, BP Patient Position: Sitting)   Pulse 83   Temp 100 °F (37.8 °C) (Tympanic)   Wt 221 lb 9.6 oz (100.5 kg)   SpO2 96%   BMI 32.72 kg/m²     General: No acute distress. Eyes: Sclera anicteric. ENT: No oral lesions. Nodes: No adenopathy. Skin: No spider angiomata. No jaundice. No palmar erythema. Respiratory: Lungs clear to auscultation. Cardiovascular: Regular heart rate. No murmurs. No JVD. Abdomen: Soft non-tender. Liver size normal to percussion/palpation. Spleen not palpable. No obvious ascites.  Lap scars.   Extremities: No edema. No muscle wasting. No gross arthritic changes. Neurologic: Alert and oriented. Cranial nerves grossly intact. No asterixis. LABORATORY STUDIES:  L[facility] [place] of [address] Units 2/7/2019 1/12/2019 1/4/2019   WBC 3.4 - 10.8 x10E3/uL 12.3 (H) 11.9 (H) 7.6   ANC 1.4 - 7.0 x10E3/uL      HGB 13.0 - 17.7 g/dL 14.5 14.4 15.8    - 379 x10E3/uL 235 193 178   INR 0.8 - 1.2 1.1  1.1   AST 0 - 40 IU/L 45 (H) 100 (H) 124 (H)   ALT 0 - 44 IU/L 25 104 (H) 100 (H)   Alk Phos 39 - 117 IU/L 131 (H) 106 130 (H)   Bili, Total 0.0 - 1.2 mg/dL 0.7 1.5 (H) 1.4 (H)   Bili, Direct 0.00 - 0.40 mg/dL 0.25     Albumin 3.5 - 5.5 g/dL 4.3 3.6 4.1   BUN 6 - 24 mg/dL 6 12 9   Creat 0.76 - 1.27 mg/dL 0.80 0.76 0.76   Na 134 - 144 mmol/L 141 137 135 (L)   K 3.5 - 5.2 mmol/L 4.1 4.0 4.1   Cl 96 - 106 mmol/L 100 103 103   CO2 20 - 29 mmol/L 24 24 26   Glucose 65 - 99 mg/dL 163 (H) 126 (H) 138 (H)     Cancer Screening Latest Ref Rng & Units 12/4/2018 3/2/2018   AFP, Serum 0.0 - 8.0 ng/mL 7.1 7.3   AFP-L3% 0.0 - 9.9 % 6.1 5.2   Additional lab values drawn at today's office visit are pending at the time of documentation. SEROLOGIES:  Serologies Latest Ref Rng & Units 3/2/2018   Hep A Ab, Total Negative Negative   Hep B Surface Ag Negative Negative   Hep B Surface AB QL  Non Reactive   Hep C Ab 0.0 - 0.9 s/co ratio <0.1   Ferritin 30 - 400 ng/mL 267   Iron % Saturation 15 - 55 % 36   CHRISTI Ab, Direct Negative Negative   C-ANCA Neg:<1:20 titer <1:20   P-ANCA Neg:<1:20 titer <1:20   ANCA Neg:<1:20 titer <1:20   ASMCA 0 - 19 Units 27 (H)   M2 Ab 0.0 - 20.0 Units 12.3   Ceruloplasmin 16.0 - 31.0 mg/dL 33.6 (H)   Alpha-1 antitrypsin level 90 - 200 mg/dL 173     LIVER HISTOLOGY:  11/2017. Slides reviewed by MLS. Cirrhosis. No steatosis. Mild focal non-specific inflammation adjacent to broad bands of cirrhosis. 4/2018. FibroScan performed at [address]. EkPa was 48.0. IQR/med 20%. The results suggested a fibrosis level of F4. CAP is 282, consistent with fatty liver disease. ENDOSCOPIC PROCEDURES:  6/2018. EGD performed by MLS. Large esophageal varices. Banding performed. No gastric varices. Mild portal gastropathy. 11/2018. EGD by MLS. Only mild portal hypertensive gastropathy of the body of the stomach. Gastritis of the antrum. No varices. Repeat in 11/2019. RADIOLOGY:  4/2018. Abdominal ultrasound. Liver is normal. No lesions or masses. 12/2018. Ultrasound of liver. Echogenic consistent with cirrhosis. No liver mass lesions. No dilated bile ducts. No ascites. OTHER TESTING:  Not available or performed    FOLLOW-UP:  All of the issues listed above in the assessment and plan were discussed with the patient. All questions were answered. The patient expressed a clear understanding of the above. [address] [place] [address] in 4-6 months for follow up with EGD. US in the meantime.      Sadia Ocasio PA-C  [facility] [place] of [address] [place] [address], [address] Gala Daniels  22.  [phone]

## 2019-06-25 LAB
ALBUMIN SERPL-MCNC: 4.8 G/DL (ref 3.5–5.5)
ALP SERPL-CCNC: 115 IU/L (ref 39–117)
ALT SERPL-CCNC: 77 IU/L (ref 0–44)
AST SERPL-CCNC: 92 IU/L (ref 0–40)
BILIRUB DIRECT SERPL-MCNC: 0.35 MG/DL (ref 0–0.4)
BILIRUB SERPL-MCNC: 1 MG/DL (ref 0–1.2)
BUN SERPL-MCNC: 7 MG/DL (ref 6–24)
BUN/CREAT SERPL: 10 (ref 9–20)
CALCIUM SERPL-MCNC: 9.7 MG/DL (ref 8.7–10.2)
CHLORIDE SERPL-SCNC: 101 MMOL/L (ref 96–106)
CO2 SERPL-SCNC: 27 MMOL/L (ref 20–29)
CREAT SERPL-MCNC: 0.69 MG/DL (ref 0.76–1.27)
ERYTHROCYTE [DISTWIDTH] IN BLOOD BY AUTOMATED COUNT: 13.1 % (ref 12.3–15.4)
GLUCOSE SERPL-MCNC: 119 MG/DL (ref 65–99)
HCT VFR BLD AUTO: 45.9 % (ref 37.5–51)
HGB BLD-MCNC: 15.3 G/DL (ref 13–17.7)
INR PPP: 1.1 (ref 0.8–1.2)
MCH RBC QN AUTO: 32.1 PG (ref 26.6–33)
MCHC RBC AUTO-ENTMCNC: 33.3 G/DL (ref 31.5–35.7)
MCV RBC AUTO: 96 FL (ref 79–97)
PLATELET # BLD AUTO: 257 X10E3/UL (ref 150–450)
POTASSIUM SERPL-SCNC: 4.3 MMOL/L (ref 3.5–5.2)
PROTHROMBIN TIME: 11.4 SEC (ref 9.1–12)
RBC # BLD AUTO: 4.76 X10E6/UL (ref 4.14–5.8)
SODIUM SERPL-SCNC: 141 MMOL/L (ref 134–144)
WBC # BLD AUTO: 10.1 X10E3/UL (ref 3.4–10.8)

## 2019-06-26 LAB
AFP L3 MFR SERPL: 6.9 % (ref 0–9.9)
AFP SERPL-MCNC: 7.9 NG/ML (ref 0–8)

## 2019-11-26 ENCOUNTER — TELEPHONE (OUTPATIENT)
Dept: HEMATOLOGY | Age: 44
End: 2019-11-26

## 2020-01-04 RX ORDER — FUROSEMIDE 20 MG/1
TABLET ORAL
Qty: 90 TAB | Refills: 3 | Status: SHIPPED | OUTPATIENT
Start: 2020-01-04 | End: 2021-01-07

## 2020-02-06 ENCOUNTER — OFFICE VISIT (OUTPATIENT)
Dept: HEMATOLOGY | Age: 45
End: 2020-02-06

## 2020-02-06 VITALS
SYSTOLIC BLOOD PRESSURE: 178 MMHG | OXYGEN SATURATION: 96 % | DIASTOLIC BLOOD PRESSURE: 105 MMHG | WEIGHT: 220 LBS | BODY MASS INDEX: 32.58 KG/M2 | HEIGHT: 69 IN | TEMPERATURE: 98 F | HEART RATE: 94 BPM

## 2020-02-06 DIAGNOSIS — K74.60 CIRRHOSIS OF LIVER WITHOUT ASCITES, UNSPECIFIED HEPATIC CIRRHOSIS TYPE (HCC): ICD-10-CM

## 2020-02-06 DIAGNOSIS — R18.8 CIRRHOSIS OF LIVER WITH ASCITES, UNSPECIFIED HEPATIC CIRRHOSIS TYPE (HCC): Primary | ICD-10-CM

## 2020-02-06 DIAGNOSIS — K74.60 CIRRHOSIS OF LIVER WITH ASCITES, UNSPECIFIED HEPATIC CIRRHOSIS TYPE (HCC): Primary | ICD-10-CM

## 2020-02-06 LAB
ERYTHROCYTE [DISTWIDTH] IN BLOOD BY AUTOMATED COUNT: 11.6 % (ref 11.6–15.4)
HCT VFR BLD AUTO: 45.2 % (ref 37.5–51)
HGB BLD-MCNC: 16.2 G/DL (ref 13–17.7)
MCH RBC QN AUTO: 33.5 PG (ref 26.6–33)
MCHC RBC AUTO-ENTMCNC: 35.8 G/DL (ref 31.5–35.7)
MCV RBC AUTO: 94 FL (ref 79–97)
PLATELET # BLD AUTO: 154 X10E3/UL (ref 150–450)
RBC # BLD AUTO: 4.83 X10E6/UL (ref 4.14–5.8)
WBC # BLD AUTO: 7.6 X10E3/UL (ref 3.4–10.8)

## 2020-02-06 RX ORDER — TRAZODONE HYDROCHLORIDE 50 MG/1
50 TABLET ORAL
Qty: 30 TAB | Refills: 3 | Status: SHIPPED | OUTPATIENT
Start: 2020-02-06 | End: 2020-03-04 | Stop reason: SDUPTHER

## 2020-02-06 NOTE — PROGRESS NOTES
Rosmery Walker is a 45 y.o. male  Chief Complaint   Patient presents with    Follow-up       Visit Vitals  BP (!) 178/105 (BP 1 Location: Left arm, BP Patient Position: Sitting)   Pulse 94   Temp 98 °F (36.7 °C) (Tympanic)   Ht 5' 9\" (1.753 m)   Wt 220 lb (99.8 kg)   SpO2 96%   BMI 32.49 kg/m²     /105  patient reports no current symptoms, has been out of his blood pressure medicine since 12/2019  Provider notified via exam room white board. 3 most recent PHQ Screens 2/6/2020   Little interest or pleasure in doing things Not at all   Feeling down, depressed, irritable, or hopeless Not at all   Total Score PHQ 2 0     Learning Assessment 6/24/2019   PRIMARY LEARNER Patient   HIGHEST LEVEL OF EDUCATION - PRIMARY LEARNER  -   BARRIERS PRIMARY LEARNER [address] CAREGIVER No   CO-LEARNER NAME no   PRIMARY LANGUAGE ENGLISH    NEED -   LEARNER PREFERENCE PRIMARY LISTENING   LEARNING SPECIAL TOPICS -   ANSWERED BY patient    RELATIONSHIP SELF     Abuse Screening Questionnaire 6/24/2019   Do you ever feel afraid of your partner? N   Are you in a relationship with someone who physically or mentally threatens you? N   Is it safe for you to go home? Y         1. Have you been to the ER, urgent care clinic since your last visit? Hospitalized since your last visit? No    2. Have you seen or consulted any other health care providers outside of the [address] [place] [address] since your last visit? Include any pap smears or colon screening.  No

## 2020-02-06 NOTE — PROGRESS NOTES
[facility], Carlos CONTRERAS Lona Homestead, MD Carlene Man, PA-C Elner Perks, ACNP-BC     April S Jessica, [facility]-BC   Vanna Pleitez, DILAN [address]    at [address], [address] Gala Daniels  22.    [phone]    FAX: [address] [place] [address], [address], [address]    [phone]    FAX: [phone]       Patient Care Team:  Mira James MD as PCP - General (Family Practice)  Mira James MD as PCP - [facility] Provider  Pankaj Zhu MD (Surgery)    Problem List  Date Reviewed: 6/24/2019          Codes Class Noted    Ventral hernia ICD-10-CM: K43.9  ICD-9-CM: 553.20  0/05/1455        Umbilical hernia TLS-74-QS: K42.9  ICD-9-CM: 553.1  1/11/2019        S/P laparoscopy ICD-10-CM: T27.275  ICD-9-CM: V45.89  12/4/2017    Overview Signed 12/4/2017 10:19 AM by Pankaj Zhu MD     Diagnostic, core needle biopsy of the liver. Cirrhosis ([facility] [address] ICD-10-CM: K74.60  ICD-9-CM: 571.5  11/14/2017    Overview Signed 9/7/2018 10:15 AM by Amado Cortez             Ascites ICD-10-CM: R18.8  ICD-9-CM: 789.59  11/14/2017        Obesity (BMI 30.0-34.9) ICD-10-CM: E66.9  ICD-9-CM: 278.00  11/6/2017        HTN (hypertension) ICD-10-CM: I10  ICD-9-CM: 401.9  11/6/2017        Incarcerated umbilical hernia PNM-70-TN: K42.0  ICD-9-CM: 552.1  11/6/2017    Overview Signed 11/6/2017 10:19 AM by Pankaj Zhu MD     Without gangrene or obstruction.              Psoriasis (Chronic) ICD-10-CM: L40.9  ICD-9-CM: 696.1  10/27/2017    Overview Signed 10/27/2017  2:33 PM by Amado Romero returns to the [place] [address] for management of cirrhosis of unclear etiology. The active problem list, all pertinent past medical history, medications, liver histology, radiologic findings and laboratory findings related to the liver disorder were reviewed with the patient. The patient is a 45 y.o. black male who was found to have chronic liver disease and cirrhosis in 11/2017 when he underwent an attempt at lap umbilical hernia repair. The surgery was aborted after discovering cirrhosis/ascites. The patient has developed the following complications of cirrhosis: ascites (resolved). He is no longer taking diuretics. The patient has not experienced fatigue, pain in the right side over the liver, nausea, vomiting, return of lower extremity edema or ascites. The patient completes all daily activities without any functional limitations. He ultimately had his hernia repaired 1/2019. He is pleased with this outcome and there has been no apparent return of ascites. Patient has been out of BP medications for the past 6 weeks, he is due for recheck with PCP tomorrow. Patient had been drinking nightly to induce sleep until ~8/2019. He reports that he has cut this out nearly entirely but is having issues with sleep maintenance. He had some alcohol this past Superbowl weekend but in general has cut back significantly. We had him scheduled for US and EGD as screening studies, but he has canceled due to concerns about high copay/OOP expenses. ASSESSMENT AND PLAN:    Cirrhosis   The diagnosis of cirrhosis is based upon liver biopsy. The etiology for cirrhosis is unclear, likely alcohol associated. Serologic testing for causes of chronic liver disease were positive for ASMA   However the biopsy does not have any features consistent with an autoimmune hepatitis. The patient used to consume up to 6 alcoholic beverages daily.  He also admitted today a few years ago he drank very heavily. The etiology for cirrhosis may be alcohol. He has largely eliminated alcohol since 8/2019. The CTP is 5. Child class A. The MELD score was last calculated at 7. This will be recalculated at this time. Ascites   Ascites first appeared in 11/2017. This was found at the time of laparoscopic surgery in 11/2017 and was minimal.  He has done well off of his diuretics since 1/2019. He is maintaining sodium restricted diet as much as possible. Esophageal varices   The patient has esophageal varices without prior bleeding. Varices have treated with banding in the past.   The last EGD to assess for varices was performed 11/23/2018. Next due in 1 year, 11/2019. This had been scheduled, but patient has cancelled due to concern for bills/cost.  We have deferred scheduling as he notes he is unable to do this at present. I have reviewed with him the signs of GI blood losses to look for and to present to the EMD if present. Will revisit scheduling this at his next follow-up. Hepatic encephalopathy   This has not developed to date. There is no need for treatment with lactulose and/or Xifaxan at this time. No need to restrict dietary protein at this time. Screening for hepatocellular carcinoma  HCC screening has recently been performed and does not suggest [facility] [address] The next liver imaging study is overdue. I have given the patient the number to call to have this scheduled in the near future. AFP will be checked today. Treatment of other medical problems in patients with chronic liver disease  There are no contraindications for the patient to take any medications that are necessary for treatment of other medical issues. The patient has cirrhosis. Patients with cirrhosis should not use NSAIDs if possible as this is associated with a higher rate of LEXIE.         Counseling for alcohol in patients with chronic liver disease  The patient has cirrhosis and was advised to be abstinent from all alcohol including non-alcoholic beer which does contain some alcohol. The patient has cut back markedly on alcohol since 8/2019. We have reviewed the critical importance of elimination of all alcohol. Insomnia  Patient states that he drinks alcohol frequently due to problems with initiation of sleep. I have given him a trial of trazodone 50 mg to help in this regard. Hypertension. Patient urged to keep appointment this week with PCP for management of BP. He has been out of medications for several weeks. Osteoporosis  The risk of osteoporosis is increased in patients with cirrhosis. DEXA bone density to assess for osteoporosis has not been performed. Vaccinations   Vaccination for viral hepatitis A and B is recommended since the patient has no serologic evidence of previous exposure or vaccination with immunity. Routine vaccinations against other bacterial and viral agents can be performed as indicated. Annual flu vaccination should be administered if indicated. ALLERGIES  Allergies   Allergen Reactions    Metformin Other (comments)     Cannot tolerate pill size  Other reaction(s): Other (comments)  Cannot tolerate pill size        MEDICATIONS  Current Outpatient Medications   Medication Sig    BYSTOLIC 10 mg tablet TAKE 1 TABLET BY MOUTH EVERY DAY    furosemide (LASIX) 20 mg tablet TAKE 1 TABLET BY MOUTH EVERY DAY    amLODIPine (NORVASC) 5 mg tablet TAKE 1 TABLET BY MOUTH DAILY. No current facility-administered medications for this visit. SYSTEM REVIEW NOT RELATED TO LIVER DISEASE OR REVIEWED ABOVE:  Constitution systems: Negative for fever, chills, weight gain, weight loss. Eyes: Negative for visual changes. ENT: Negative for sore throat, painful swallowing. Respiratory: Negative for cough, hemoptysis, SOB. Cardiology: Negative for chest pain, palpitations. GI:  Negative for constipation or diarrhea. : Negative for urinary frequency, dysuria, hematuria, nocturia. Skin: Negative for rash. Hematology: Negative for easy bruising, blood clots. Musculo-skeletal: Negative for back pain, muscle pain, weakness. Neurologic: Negative for headaches, dizziness, vertigo, memory problems not related to HE. Psychology: Negative for anxiety, depression. FAMILY HISTORY:  The father  of a heart attack. The mother  of cancer. There is no family history of liver disease. SOCIAL HISTORY:  The patient has never been . The patient has no children. The patient smokes cigars on the weekends. The patient drinks about 2 beers a night. He has been abstinent since 2019. The patient currently works full time at [employer] as a . PHYSICAL EXAMINATION:  Visit Vitals  BP (!) 178/105 (BP 1 Location: Left arm, BP Patient Position: Sitting)   Pulse 94   Temp 98 °F (36.7 °C) (Tympanic)   Ht 5' 9\" (1.753 m)   Wt 220 lb (99.8 kg)   SpO2 96%   BMI 32.49 kg/m²     General: No acute distress. Eyes: Sclera anicteric. ENT: No oral lesions. Nodes: No adenopathy. Skin: No spider angiomata. No jaundice. No palmar erythema. Respiratory: Lungs clear to auscultation. Cardiovascular: Regular heart rate. No murmurs. No JVD. Abdomen: Soft non-tender. Liver size normal to percussion/palpation. Spleen not palpable. No obvious ascites. Lap scars. Extremities: No edema. No muscle wasting. No gross arthritic changes. Neurologic: Alert and oriented. Cranial nerves grossly intact. No asterixis.     LABORATORY STUDIES:  Liver [place] of [address] Units 2019   WBC 3.4 - 10.8 x10E3/uL 10.1  12.3 (H)   ANC 1.4 - 7.0 x10E3/uL      HGB 13.0 - 17.7 g/dL 15.3  14.5    - 450 x10E3/uL 257  235   INR 0.8 - 1.2 1.1  1.1   AST 0 - 40 IU/L 92 (H)  45 (H)   ALT 0 - 44 IU/L 77 (H)  25   Alk Phos 39 - 117 IU/L 115  131 (H)   Bili, Total 0.0 - 1.2 mg/dL 1.0  0.7   Bili, Direct 0.00 - 0.40 mg/dL 0.35  0.25   Albumin 3.5 - 5.5 g/dL 4.8  4.3   BUN 6 - 24 mg/dL 7 6 6   Creat 0.76 - 1.27 mg/dL 0.69 (L) 0.74 (L) 0.80   Na 134 - 144 mmol/L 141 139 141   K 3.5 - 5.2 mmol/L 4.3 4.3 4.1   Cl 96 - 106 mmol/L 101 99 100   CO2 20 - 29 mmol/L 27 28 24   Glucose 65 - 99 mg/dL 119 (H) 119 (H) 163 (H)     Cancer Screening Latest Ref Rng & Units 6/24/2019 12/4/2018 3/2/2018   AFP, Serum 0.0 - 8.0 ng/mL 7.9 7.1 7.3   AFP-L3% 0.0 - 9.9 % 6.9 6.1 5.2   Additional lab values drawn at today's office visit are pending at the time of documentation. SEROLOGIES:  Serologies Latest Ref Rng & Units 3/2/2018   Hep A Ab, Total Negative Negative   Hep B Surface Ag Negative Negative   Hep B Surface AB QL  Non Reactive   Hep C Ab 0.0 - 0.9 s/co ratio <0.1   Ferritin 30 - 400 ng/mL 267   Iron % Saturation 15 - 55 % 36   CHRISTI Ab, Direct Negative Negative   C-ANCA Neg:<1:20 titer <1:20   P-ANCA Neg:<1:20 titer <1:20   ANCA Neg:<1:20 titer <1:20   ASMCA 0 - 19 Units 27 (H)   M2 Ab 0.0 - 20.0 Units 12.3   Ceruloplasmin 16.0 - 31.0 mg/dL 33.6 (H)   Alpha-1 antitrypsin level 90 - 200 mg/dL 173     LIVER HISTOLOGY:  11/2017. Slides reviewed by MLS. Cirrhosis. No steatosis. Mild focal non-specific inflammation adjacent to broad bands of cirrhosis. 4/2018. FibroScan performed at [facility] [place]. EkPa was 48.0. IQR/med 20%. The results suggested a fibrosis level of F4. CAP is 282, consistent with fatty liver disease. ENDOSCOPIC PROCEDURES:  6/2018. EGD performed by MLS. Large esophageal varices. Banding performed. No gastric varices. Mild portal gastropathy. 11/2018. EGD by MLS. Only mild portal hypertensive gastropathy of the body of the stomach. Gastritis of the antrum. No varices. Repeat in 11/2019. RADIOLOGY:  4/2018. Abdominal ultrasound. Liver is normal. No lesions or masses. 12/2018. Ultrasound of liver. Echogenic consistent with cirrhosis. No liver mass lesions. No dilated bile ducts. No ascites.     OTHER TESTING:  Not available or performed    FOLLOW-UP:  All of the issues listed above in the assessment and plan were discussed with the patient. All questions were answered. The patient expressed a clear understanding of the above. [facility] 87 in 4 months. US in the meantime.      Vannessa Harris PA-C  Liver [facility], [facility] 22.  [phone]  [address]

## 2020-02-07 LAB
AFP L3 MFR SERPL: NORMAL % (ref 0–9.9)
AFP SERPL-MCNC: 7.3 NG/ML (ref 0–8)
ALBUMIN SERPL-MCNC: 4.6 G/DL (ref 4–5)
ALP SERPL-CCNC: 132 IU/L (ref 39–117)
ALT SERPL-CCNC: 52 IU/L (ref 0–44)
AST SERPL-CCNC: 77 IU/L (ref 0–40)
BILIRUB DIRECT SERPL-MCNC: 0.63 MG/DL (ref 0–0.4)
BILIRUB SERPL-MCNC: 2 MG/DL (ref 0–1.2)
BUN SERPL-MCNC: 8 MG/DL (ref 6–24)
BUN/CREAT SERPL: 11 (ref 9–20)
CALCIUM SERPL-MCNC: 9.7 MG/DL (ref 8.7–10.2)
CHLORIDE SERPL-SCNC: 99 MMOL/L (ref 96–106)
CO2 SERPL-SCNC: 23 MMOL/L (ref 20–29)
CREAT SERPL-MCNC: 0.72 MG/DL (ref 0.76–1.27)
GLUCOSE SERPL-MCNC: 118 MG/DL (ref 65–99)
INR PPP: 1.1 (ref 0.8–1.2)
POTASSIUM SERPL-SCNC: 4 MMOL/L (ref 3.5–5.2)
PROTHROMBIN TIME: 11.7 SEC (ref 9.1–12)
SODIUM SERPL-SCNC: 141 MMOL/L (ref 134–144)

## 2020-02-10 NOTE — PROGRESS NOTES
Pt notified of elevation of liver enzymes and his bilirubin bump. This may be a reflection of ongoing alcohol use. Recommended patient to call for ultrasound scheduling as soon as possible.

## 2020-03-04 DIAGNOSIS — K74.60 CIRRHOSIS OF LIVER WITHOUT ASCITES, UNSPECIFIED HEPATIC CIRRHOSIS TYPE (HCC): ICD-10-CM

## 2020-03-04 RX ORDER — TRAZODONE HYDROCHLORIDE 50 MG/1
50 TABLET ORAL
Qty: 90 TAB | Refills: 1 | Status: SHIPPED | OUTPATIENT
Start: 2020-03-04 | End: 2020-09-15

## 2020-12-07 NOTE — PROGRESS NOTES
Faxed letter to employer extending leave time to 2/18/2019. Also, mailed copy of letter to patient.
Normal rate, regular rhythm.  Heart sounds S1, S2.  No murmurs, rubs or gallops.

## 2021-01-07 RX ORDER — FUROSEMIDE 20 MG/1
TABLET ORAL
Qty: 90 TAB | Refills: 3 | Status: SHIPPED | OUTPATIENT
Start: 2021-01-07

## 2022-03-18 PROBLEM — R18.8 ASCITES: Status: ACTIVE | Noted: 2017-11-14

## 2022-03-18 PROBLEM — L40.9 PSORIASIS: Status: ACTIVE | Noted: 2017-10-27

## 2022-03-18 PROBLEM — E66.9 OBESITY (BMI 30.0-34.9): Status: ACTIVE | Noted: 2017-11-06

## 2022-03-19 PROBLEM — Z98.890 S/P LAPAROSCOPY: Status: ACTIVE | Noted: 2017-12-04

## 2022-03-19 PROBLEM — K43.9 VENTRAL HERNIA: Status: ACTIVE | Noted: 2019-01-11

## 2022-03-19 PROBLEM — K74.60 CIRRHOSIS (HCC): Status: ACTIVE | Noted: 2017-11-14

## 2022-03-19 PROBLEM — K42.0 INCARCERATED UMBILICAL HERNIA: Status: ACTIVE | Noted: 2017-11-06

## 2022-03-19 PROBLEM — K42.9 UMBILICAL HERNIA: Status: ACTIVE | Noted: 2019-01-11

## 2022-03-20 PROBLEM — I10 HTN (HYPERTENSION): Status: ACTIVE | Noted: 2017-11-06

## 2022-09-29 NOTE — H&P
Date of Surgery Update: 
Magan Toledo was seen and examined. History and physical has been reviewed. The patient has been examined. There have been no significant clinical changes since the completion of the originally dated History and Physical.  The patient has well compensated cirrhosis and an umbilical hernia that is symptomatic. He was referred by Hepatology for repair of the hernia. I have discussed possible risks of repair including liver decompensation and death in addition to the usual risks for laparoscopic umbilical hernia repair. Will plan for laparoscopic umbilical hernia repair with possible conversion to open. A complete discussion of the risks, benefits and alternatives to surgery were discussed with the patient who was keen to proceed. Signed By: Karishma Pack MD   
 January 11, 2019 1:37 PM   
  
 
Please note from the office and include the additional information below: 
 
Past Medical History Past Medical History:  
Diagnosis Date  Diabetes ([address]   
 no medication  Hypertension  Ill-defined condition   
 hernia - navel  Liver disease   
 cirrhosis  Obesity (BMI 30.0-34. 9)  Psoriasis Past Surgical History Past Surgical History:  
Procedure Laterality Date [address] [place] [address] UNLISTED  2017 Diagnostic laparoscopy, core needle biopsy of the liver  HX GI  2018 ENDOSCOPY Social History The patient Magan Toledo  reports that he has been smoking cigars. he has never used smokeless tobacco. He reports that he drinks about 1.2 oz of alcohol per week. He reports that he does not use drugs. Family History Family History Problem Relation Age of Onset  Cancer Mother Stomach  Diabetes Father  Diabetes Brother  Anesth Problems Neg Hx Karishma Pack MD 
 
 Per request from Dr. Cruz Lee to notify that discharge order is in for patient and request NICKY for discharge. PerfectServe message sent, awaiting recommendations for discharge from Infectious Disease.      Stewart Gomez RN

## 2023-05-25 RX ORDER — FUROSEMIDE 20 MG/1
1 TABLET ORAL DAILY
COMMUNITY
Start: 2021-01-07

## 2023-05-25 RX ORDER — AMLODIPINE BESYLATE 5 MG/1
1 TABLET ORAL DAILY
COMMUNITY
Start: 2019-04-17

## 2023-05-25 RX ORDER — NEBIVOLOL 10 MG/1
1 TABLET ORAL DAILY
COMMUNITY
Start: 2020-01-17

## 2023-05-25 RX ORDER — TRAZODONE HYDROCHLORIDE 50 MG/1
1 TABLET ORAL NIGHTLY
COMMUNITY
Start: 2020-12-15

## 2023-10-31 NOTE — MR AVS SNAPSHOT
[address] [facility] 
 
 
 [address] [place] [address] 100[address] [facility] 
[phone] Patient: Joaquin Linda MRN: [id number] Visit Information Date & Time Provider Department Dept. Phone Encounter #  
 2018  9:45 AM Nicko Goncalves MD [facility] [address] [facility] [id number] Follow-up Instructions Return in about 4 weeks (around 10/5/2018). Your Appointments 10/4/2018  1:00 PM  
PROCEDURE with MD Danna Estevez  [address] [place] [address]) Appt Note: EGD; r/s per Calista/Kellie due to room shortage; EGD  
 [facility] .[phone] [address] [place] [address]  
[phone]  
  
   
 [facility] 505 [facility] [address]  
  
    
 10/18/2018 10:00 AM  
Follow Up with MD Bj Estevez[address]) Appt Note: Follow up; Follow up [facility] [phone] [facility] [address]  
[facility] Upcoming Health Maintenance Date Due  
 LIPID PANEL Q1 1977 FOOT EXAM Q1 5/3/1987 EYE EXAM RETINAL OR DILATED Q1 5/3/1987 Pneumococcal 19-64 Medium Risk (1 of 1 - PPSV23) 5/3/1996 DTaP/Tdap/Td series (1 - Tdap) 5/3/1998 HEMOGLOBIN A1C Q6M 2018 Influenza Age 5 to Adult 2018 MICROALBUMIN Q1 10/27/2018 Allergies as of 2018  Review Complete On: 2018 By: Stacey Salazar LPN No Known Allergies Current Immunizations  Reviewed on 10/27/2017 Name Date  
 TB Skin Test (PPD) 2017 Not reviewed this visit You Were Diagnosed With   
  
 Codes Comments Essential hypertension    -  Primary ICD-10-CM: I10 
ICD-9-CM: 401.9 Elevated glucose level     ICD-10-CM: R73.09 
ICD-9-CM: 790.29 Cirrhosis of liver with ascites, unspecified hepatic cirrhosis type ([address]     ICD-10-CM: K74.60 ICD-9-CM: 571.5 Called mom back. Mom and dad have split custody of patient. Mom said that when he is with dad for his week dad does not give him prozac or adhd medication as he says that he believes nisa's mom is trying to make him dependent on medication. I advised that prozac is not a medication that can be abruptly discontinued on and off on a weekly basis. Mom says she understands and agrees and needs help on what to do next. I scheduled an appt with dr lyon when mom will have him so she can come to our office to discuss concerns and medication options. I advised she can try otc magnesium gummies to help with concentration and constipation since those were some of moms concerns.     Mom also requested that I send this message to dr laguna's staff and to have them reach out to her as well. She would also like to set up an appt with them as well as he has not been seen since July 2023. Dr laguna's staff please contact mom. Thank you    -----------------------------------------------    I met with patient and mom during integrated clinic on 11/6/23 along with pediatrician Dr. Lyon.    Patient stopped taking vyvanse out of concern for constipation, and bowel movements have been improving.    Mom reports concern about academics and reported F in reading. Refuses to work on homework despite fair understanding of material; also reportedly has hyperactive behavior, impulsive erasing of peers work, and sounds (?vocal tics) that disrupt learning at school.    As above, mom is concerned about medication adherence at dad's house and she shares a text message.    Joce is smiling, and talks excitedly about the fair while lying down on exam table.    Will refer to behavior therapy.  Asking mom and dad to attend appointment (one will have to attend virtually due to divorce stipulations) to privately discuss goals of care, and potential medication changes. Consider strattera, vs. Optimizing alpha 2 agonist.    MD Solange [facility]  Child, Adolescent, and Adult Psychiatry     Umbilical hernia without obstruction and without gangrene     ICD-10-CM: K42.9 ICD-9-CM: 553.1 Vitals BP Pulse Temp Resp Height(growth percentile) Weight(growth percentile) 146/87 (BP 1 Location: Left arm, BP Patient Position: Sitting) 87 98.2 °F (36.8 °C) (Oral) 18 5' 9\" (1.753 m) 221 lb (100.2 kg) SpO2 BMI Smoking Status 97% 32.64 kg/m2 Light Tobacco Smoker Vitals History BMI and BSA Data Body Mass Index Body Surface Area  
 32.64 kg/m 2 2.21 m 2 Preferred Pharmacy Pharmacy Name Phone [id number], [address] [phone] Your Updated Medication List  
  
   
This list is accurate as of 18 10:29 AM.  Always use your most recent med list. amLODIPine 5 mg tablet Commonly known as:  [place] TAKE 1 TABLET BY MOUTH DAILY. furosemide 20 mg tablet Commonly known as:  LASIX Take 1 Tab by mouth daily. nebivolol 10 mg tablet Commonly known as:  BYSTOLIC  
1/2 tab daily  
  
 spironolactone 50 mg tablet Commonly known as:  ALDACTONE Take 1 Tab by mouth daily. Prescriptions Sent to Pharmacy Refills  
 nebivolol (BYSTOLIC) 10 mg tablet 1 Si/2 tab daily Class: Normal  
 Pharmacy: [facility]/pharmacy #[id number] - [place], [address] Ph #: [phone] We Performed the Following AMB POC HEMOGLOBIN A1C [41274 CPT(R)] Follow-up Instructions Return in about 4 weeks (around 10/5/2018). Introducing [facility] & HEALTH SERVICES! Dear Josh Vines: 
Thank you for requesting a Quikey account. Our records indicate that you already have an active Quikey account. You can access your account anytime at [email or web address] Did you know that you can access your hospital and ER discharge instructions at any time in Quikey? You can also review all of your test results from your hospital stay or ER visit. Additional Information If you have questions, please visit the Frequently Asked Questions section of the [employer] website at [email or web address] Remember, Huix[employer]er is NOT to be used for urgent needs. For medical emergencies, dial 911. Now available from your iPhone and Android! Please provide this summary of care documentation to your next provider. Your primary care clinician is listed as Una Dodge. If you have any questions after today's visit, please call [phone].

## 2024-04-04 NOTE — PROGRESS NOTES
70 Mattie Irving MD, [address], [place], [place]       Burnadette Fila, NP Elsa Dubin, MAXIMUS Borja, ACNP-BC   Morteza Jung, DILAN [facility] 136    at [address], [address] [facility], [place] [address]. [phone]    FAX: [address] [place] [address]    at [address], [address] [facility], [address]    [phone]    FAX: [phone]         Patient Care Team:  Avelina Crump MD as PCP - General (Internal Medicine)  Fred Chappell MD (Surgery)        Problem List  Date Reviewed: 6/8/2018          Codes Class Noted    S/P laparoscopy ICD-10-CM: S88.966  ICD-9-CM: V45.89  12/4/2017    Overview Signed 12/4/2017 10:19 AM by Fred Chappell MD     Diagnostic, core needle biopsy of the liver. Cirrhosis ([facility] ICD-10-CM: K74.60  ICD-9-CM: 571.5  11/14/2017        Ascites ICD-10-CM: R18.8  ICD-9-CM: 789.59  11/14/2017        Type 2 diabetes mellitus with hemoglobin A1c goal of less than 7.0% (HCC) ICD-10-CM: E11.9  ICD-9-CM: 250.00  11/10/2017        Obesity (BMI 30.0-34.9) ICD-10-CM: E66.9  ICD-9-CM: 278.00  11/6/2017        HTN (hypertension) ICD-10-CM: I10  ICD-9-CM: 401.9  11/6/2017        Type II diabetes mellitus ([facility] ICD-10-CM: E11.9  ICD-9-CM: 250.00  11/6/2017        Incarcerated umbilical hernia XHV-[id number]: K42.0  ICD-9-CM: 552.1  11/6/2017    Overview Signed 11/6/2017 10:19 AM by Fred Chappell MD     Without gangrene or obstruction.              Psoriasis (Chronic) ICD-10-CM: L40.9  ICD-9-CM: 696.1  10/27/2017    Overview Signed 10/27/2017  2:33 PM by MD Dr. Deloris Reyes returns to the [place] [address] for management of cirrhosis of unclear etiology. The active problem list, all pertinent past medical history, medications, liver histology, radiologic findings and laboratory findings related to the liver disorder were reviewed with the patient. The patient is a 41 y.o. Black male who was found to have chronic liver disease and cirrhosis in 11/20017 during a lap umbilical hernia repair, which was aborted after discovering cirrhosis. Since the last appointment I have since received and reviewed the patient's liver biopsy slides from [facility] performed in 11/2017. This demonstrates cirrhosis without any steatosis, significant inflammation. The patient has developed the following complications of cirrhosis: ascites,     The patient has no symptoms which could be attributed to the liver disorder. His main concern is still his umbilical hernia, which he desperately wants repaired. The patient has not experienced pain in the right side over the liver, yellowing of the eyes or skin, swelling of the abdomen or swelling of the lower extremities. The patient completes all daily activities without any functional limitations. All of the issues listed in the Assessment and Plan were discussed with the patient. All questions were answered. The patient expressed a clear understanding of the above. [facility] [place] [address] in 3 months for routine monitoring. ASSESSMENT AND PLAN:  Cirrhosis   The diagnosis of cirrhosis is based upon liver biopsy  The etiology for cirrhosis is unclear. Serologic testing for causes of chronic liver disease were positive for ASMA   However the biopsy does not have any features consistent with an autoimmune hepatitis. The patient used to consume up to 6 alcoholic beverages daily. The etiology for cirrhosis may be alcohol. The CTP is 5. Child class A. The MELD score is 9. Ascites   Ascites first appeared in 11/2017.     This was found at the time of laparoscopic 2.04 surgery in 11/2017 and was minimal.  Has resolved with current dose of diuretics. Will continue the current dose of diuretics at step 1/2. The patient was counseled regarding the need to maintain sodium restriction and the types of foods containing high amounts of sodium to be avoided. Esophageal varices   The patient has esophageal varices without prior bleeding. Varices have treated with banding. The last EGD to assess for varices was performed in 6/2018. Will schedule for EGD to assess for varices and need for banding. Hepatic encephalopathy   This has not developed to date. There is no need for treatment with lactulose and/or Xifaxan at this time. No need to restrict dietary protein at this time. Screening for Hepatocellular Carcinoma  HCC screening has recently been performed and does not suggest [facility] The next liver imaging study will be performed in 10/2018. Treatment of other medical problems in patients with chronic liver disease  There are no contraindications for the patient to take any medications that are necessary for treatment of other medical issues. The patient has cirrhosis. Patients with cirrhosis should not use NSAIDs if possible as this is associated with a higher rate of LXEIE. Counseling for alcohol in patients with chronic liver disease  The patient has cirrhosis and was advised to be abstinent from all alcohol including non-alcoholic beer which does contain some alcohol. The patient continues to consume 2 alcoholc beverages daily. If the patient cannot stop consuming alcohol then there is an alcohol abuse disorder and the patient should consider entering alcohol counseling or attend AA. Osteoporosis  The risk of osteoporosis is increased in patients with cirrhosis. DEXA bone density to assess for osteoporosis has not been performed.       Vaccinations   Vaccination for viral hepatitis A and B is recommended since the patient has no serologic evidence of previous exposure or vaccination with immunity. Routine vaccinations against other bacterial and viral agents can be performed as indicated. Annual flu vaccination should be administered if indicated. ALLERGIES  No Known Allergies  MEDICATIONS  Current Outpatient Prescriptions   Medication Sig    amLODIPine (NORVASC) 5 mg tablet Take 1 Tab by mouth daily. No current facility-administered medications for this visit. SYSTEM REVIEW NOT RELATED TO LIVER DISEASE OR REVIEWED ABOVE:  Constitution systems: Negative for fever, chills, weight gain, weight loss. Eyes: Negative for visual changes. ENT: Negative for sore throat, painful swallowing. Respiratory: Negative for cough, hemoptysis, SOB. Cardiology: Negative for chest pain, palpitations. GI:  Negative for constipation or diarrhea. : Negative for urinary frequency, dysuria, hematuria, nocturia. Skin: Negative for rash. Hematology: Negative for easy bruising, blood clots. Musculo-skeletal: Negative for back pain, muscle pain, weakness. Neurologic: Negative for headaches, dizziness, vertigo, memory problems not related to HE. Psychology: Negative for anxiety, depression. FAMILY HISTORY:  The father  of a heart attack. The mother  of cancer. There is no family history of liver disease. SOCIAL HISTORY:  The patient has never been . The patient has no children. The patient smokes cigars on the weekends. The patient drinks about 2 beers a night. The patient currently works full time at [employer] as a . PHYSICAL EXAMINATION:  Visit Vitals    BP (!) 155/93 (BP 1 Location: Left arm, BP Patient Position: Sitting)    Pulse 90    Temp 98.1 °F (36.7 °C) (Tympanic)    Ht 5' 9\" (1.753 m)    Wt 221 lb 9.6 oz (100.5 kg)    SpO2 98%    BMI 32.72 kg/m2     General: No acute distress. Eyes: Sclera anicteric. ENT: No oral lesions. Nodes: No adenopathy. Skin: No spider angiomata.   No jaundice. No palmar erythema. Respiratory: Lungs clear to auscultation. Cardiovascular: Regular heart rate. No murmurs. No JVD. Abdomen: Soft non-tender. Liver size normal to percussion/palpation. Spleen not palpable. No obvious ascites. Extremities: No edema. No muscle wasting. No gross arthritic changes. Neurologic: Alert and oriented. Cranial nerves grossly intact. No asterixis. LABORATORY STUDIES:  [facility] of [address] [place] [address] & Units 6/22/2018 4/11/2018   WBC 3.4 - 10.8 x10E3/uL 10.0 10.0   ANC 1.4 - 7.0 x10E3/uL 5.1    HGB 13.0 - 17.7 g/dL 15.3 16.7    - 379 x10E3/uL 255 246   INR 0.8 - 1.2 1.0    AST 0 - 40 IU/L 56 (H) 76 (H)   ALT 0 - 44 IU/L 46 (H) 49 (H)   Alk Phos 39 - 117 IU/L 123 (H) 116   Bili, Total 0.0 - 1.2 mg/dL 0.9 1.0   Bili, Direct 0.00 - 0.40 mg/dL 0.33 0.36   Albumin 3.5 - 5.5 g/dL 4.6 4.7   BUN 6 - 24 mg/dL 10 9   Creat 0.76 - 1.27 mg/dL 0.76 0.71 (L)   Na 134 - 144 mmol/L 137 140   K 3.5 - 5.2 mmol/L 4.1 4.3   Cl 96 - 106 mmol/L 98 98   CO2 20 - 29 mmol/L 25 26   Glucose 65 - 99 mg/dL 102 (H) 102 (H)     SEROLOGIES:  Serologies Latest Ref Rng & Units 3/2/2018   Hep A Ab, Total Negative Negative   Hep B Surface Ag Negative Negative   Hep B Surface AB QL  Non Reactive   Hep C Ab 0.0 - 0.9 s/co ratio <0.1   Ferritin 30 - 400 ng/mL 267   Iron % Saturation 15 - 55 % 36   CHRISTI Ab, Direct Negative Negative   C-ANCA Neg:<1:20 titer <1:20   P-ANCA Neg:<1:20 titer <1:20   ANCA Neg:<1:20 titer <1:20   ASMCA 0 - 19 Units 27 (H)   M2 Ab 0.0 - 20.0 Units 12.3   Ceruloplasmin 16.0 - 31.0 mg/dL 33.6 (H)   Alpha-1 antitrypsin level 90 - 200 mg/dL 173     LIVER HISTOLOGY:  11/2017. Slides reviewed by MLS. Cirrhosis. NO steatosis. Mild focal non-specific inflammation adjacent to broad bands of cirhrosis  4/2018. FibroScan performed at [facility]. EkPa was 48.0. IQR/med 20%. The results suggested a fibrosis level of F4.  CAP is 282, consistent with fatty liver disease. ENDOSCOPIC PROCEDURES:  6/2018. EGD performed by MLS. Large esophageal varices. Banding performed. No gastric varices. Mild portal gastropathy. RADIOLOGY:  4/2018. Abdominal ultrasound. Liver is normal. No lesions or masses.      OTHER TESTING:  Not available or performed      MD Kimo Giraldo 13 of [facility] of [address] [address] Chioma Urena [address] [place], [place] [address]  [address].  [phone]

## 2025-05-30 NOTE — ANESTHESIA POSTPROCEDURE EVALUATION
Procedure(s): LAPAROSCOPIC UMBILICAL HERNIA REPAIR, POSSIBLE OPEN. Anesthesia Post Evaluation Comments: Post-Anesthesia Evaluation and Assessment I have evaluated the patient and they are ready for PACU discharge. Patient: Greg Vyas MRN: [id number]  SSN: [id number] YOB: 1975  Age: 41 y.o. Sex: male Cardiovascular Function/Vital Signs /57   Pulse 74   Temp 37.2 °C (99 °F)   Resp 20   Ht 5' 9\" (1.753 m)   Wt 101.4 kg (223 lb 8 oz)   SpO2 97%   BMI 33.01 kg/m² Patient is status post General anesthesia for Procedure(s): LAPAROSCOPIC UMBILICAL HERNIA REPAIR, POSSIBLE OPEN. Nausea/Vomiting: None Postoperative hydration reviewed and adequate. Pain: 
Pain Scale 1: Numeric (0 - 10) (01/11/19 1620) Pain Intensity 1: 5 (01/11/19 1620) Managed Neurological Status:  
Neuro (WDL): Within Defined Limits (01/11/19 1620) Neuro Neurologic State: Alert (01/11/19 1620) Orientation Level: Oriented X4 (01/11/19 1620) Cognition: Appropriate decision making; Appropriate for age attention/concentration; Appropriate safety awareness; Follows commands (01/11/19 1620) Speech: Clear (01/11/19 1620) LUE Motor Response: Purposeful (01/11/19 1620) LLE Motor Response: Purposeful (01/11/19 1620) RUE Motor Response: Purposeful (01/11/19 1620) RLE Motor Response: Purposeful (01/11/19 1620) At baseline Mental Status, Level of Consciousness: Alert and  oriented to person, place, and time Pulmonary Status:  
O2 Device: Room air (01/11/19 1625) Adequate oxygenation and airway patent Complications related to anesthesia: None Post-anesthesia assessment completed. No concerns Signed By: Emma Ohara MD  
 January 11, 2019 Visit Vitals /57 Pulse 74 Temp 37.2 °C (99 °F) Resp 20 Ht 5' 9\" (1.753 m) Wt 101.4 kg (223 lb 8 oz) SpO2 97% BMI 33.01 kg/m² Nsaids Counseling: NSAID Counseling: I discussed with the patient that NSAIDs should be taken with food. Prolonged use of NSAIDs can result in the development of stomach ulcers.  Patient advised to stop taking NSAIDs if abdominal pain occurs.  The patient verbalized understanding of the proper use and possible adverse effects of NSAIDs.  All of the patient's questions and concerns were addressed.

## (undated) DEVICE — UNIVERSAL FIXATION CANNULA: Brand: VERSAONE

## (undated) DEVICE — OBTRTR BLDELSS 8MM DISP -- DA VINCI - SNGL USE

## (undated) DEVICE — SOLUTION IV 1000ML 0.9% SOD CHL

## (undated) DEVICE — NEEDLE HYPO 25GA L1.5IN BVL ORIENTED ECLIPSE

## (undated) DEVICE — TROCAR SITE CLOSURE DEVICE: Brand: ENDO CLOSE

## (undated) DEVICE — TIP COVER ACCESSORY

## (undated) DEVICE — BLADELESS OPTICAL TROCAR WITH FIXATION CANNULA: Brand: VERSAPORT

## (undated) DEVICE — CONTAINER SPEC 20 ML LID NEUT BUFF FORMALIN 10 % POLYPR STS

## (undated) DEVICE — SUTURE MCRYL SZ 4-0 L27IN ABSRB UD L19MM PS-2 1/2 CIR PRIM Y426H

## (undated) DEVICE — BLADELESS OPTICAL TROCAR WITH FIXATION CANNULA: Brand: VERSAONE

## (undated) DEVICE — SOLIDIFIER FLUID 3000 CC ABSORB

## (undated) DEVICE — 3M™ TEGADERM™ TRANSPARENT FILM DRESSING FRAME STYLE, 1626W, 4 IN X 4-3/4 IN (10 CM X 12 CM), 50/CT 4CT/CASE: Brand: 3M™ TEGADERM™

## (undated) DEVICE — SUTURE PROL SZ 2-0 L18IN NONABSORBABLE BLU FS L26MM 3/8 CIR 8685H

## (undated) DEVICE — NEEDLE HYPO 18GA L1.5IN PNK S STL HUB POLYPR SHLD REG BVL

## (undated) DEVICE — SUTURE SZ 0 27IN 5/8 CIR UR-6  TAPER PT VIOLET ABSRB VICRYL J603H

## (undated) DEVICE — STAPLER INT DIA5MM 25 ABSRB STRP FIX DISP FOR HERN MESH

## (undated) DEVICE — BITE BLK ENDOSCP AD 54FR GRN POLYETH ENDOSCP W STRP SLD

## (undated) DEVICE — 3M™ IOBAN™ 2 ANTIMICROBIAL INCISE DRAPE 6650EZ: Brand: IOBAN™ 2

## (undated) DEVICE — CATH IV AUTOGRD BC BLU 22GA 25 -- INSYTE

## (undated) DEVICE — SEAL UNIV 5-8MM DISP BX/10 -- DA VINCI XI - SNGL USE

## (undated) DEVICE — SOL IRRIGATION INJ NACL 0.9% 500ML BTL

## (undated) DEVICE — Device

## (undated) DEVICE — DRAPE,REIN 53X77,STERILE: Brand: MEDLINE

## (undated) DEVICE — APPLICATOR BNDG 1MM ADH PREMIERPRO EXOFIN

## (undated) DEVICE — STERILE POLYISOPRENE POWDER-FREE SURGICAL GLOVES WITH EMOLLIENT COATING: Brand: PROTEXIS

## (undated) DEVICE — VISUALIZATION SYSTEM: Brand: CLEARIFY

## (undated) DEVICE — SET ADMIN 16ML TBNG L100IN 2 Y INJ SITE IV PIGGY BK DISP

## (undated) DEVICE — 3000CC GUARDIAN II: Brand: GUARDIAN

## (undated) DEVICE — PAD 05IN BASE 3IN PEAK M DENS CONVOLUTED FOAM

## (undated) DEVICE — CLICKLINE SCISSORS INSERT: Brand: CLICKLINE

## (undated) DEVICE — FILTER SMK EVAC FLO CLR MEGADYNE

## (undated) DEVICE — DEVON™ KNEE AND BODY STRAP 60" X 3" (1.5 M X 7.6 CM): Brand: DEVON

## (undated) DEVICE — SURGICAL PROCEDURE KIT GEN LAPAROSCOPY LF

## (undated) DEVICE — KIT IV STRT W CHLORAPREP PD 1ML

## (undated) DEVICE — ENDO CARRY-ON PROCEDURE KIT INCLUDES ENZYMATIC SPONGE, GAUZE, BIOHAZARD LABEL, TRAY, LUBRICANT, DIRTY SCOPE LABEL, WATER LABEL, TRAY, DRAWSTRING PAD, AND DEFENDO 4-PIECE KIT.: Brand: ENDO CARRY-ON PROCEDURE KIT

## (undated) DEVICE — CORD ELECSURG BPLR 12 FT DISP [810T818750] [ADLER INSTRUMENT CO]

## (undated) DEVICE — SUTURE ETHLN SZ 2-0 L18IN NONABSORBABLE BLK L26MM FS 3/8 664G

## (undated) DEVICE — BAG BELONG PT PERS CLEAR HANDL

## (undated) DEVICE — (D)STRIP SKN CLSR 0.5X4IN WHT --

## (undated) DEVICE — INSUFFLATION NEEDLE: Brand: SURGINEEDLE

## (undated) DEVICE — STERILE POLYISOPRENE POWDER-FREE SURGICAL GLOVES: Brand: PROTEXIS

## (undated) DEVICE — BW-412T DISP COMBO CLEANING BRUSH: Brand: SINGLE USE COMBINATION CLEANING BRUSH

## (undated) DEVICE — TUBING INSUFLTN 10FT LUER -- CONVERT TO ITEM 368568

## (undated) DEVICE — SUTURE VCRL SZ 2-0 L27IN ABSRB UD L26MM SH 1/2 CIR J417H

## (undated) DEVICE — DRAPE,UTILTY,TAPE,15X26, 4EA/PK: Brand: MEDLINE

## (undated) DEVICE — Z DISCONTINUED NO SUB IDED SET EXTN W/ 4 W STPCOCK M SPIN LOK 36IN

## (undated) DEVICE — (D)PREP SKN CHLRAPRP APPL 26ML -- CONVERT TO ITEM 371833

## (undated) DEVICE — INSTRMT SET WND CLSR SUT PASS --

## (undated) DEVICE — SYRINGE MED 20ML STD CLR PLAS LUERLOCK TIP N CTRL DISP

## (undated) DEVICE — CANN NASAL O2 CAPNOGRAPHY AD -- FILTERLINE

## (undated) DEVICE — DRAPE SURG EQUIP W105XH13XL20IN 3 ARM DISPOSABLE DA VINCI S

## (undated) DEVICE — KENDALL SCD EXPRESS SLEEVES, KNEE LENGTH, MEDIUM: Brand: KENDALL SCD

## (undated) DEVICE — KENDALL RADIOLUCENT FOAM MONITORING ELECTRODE -RECTANGULAR SHAPE: Brand: KENDALL

## (undated) DEVICE — DERMABOND SKIN ADH 0.7ML -- DERMABOND ADVANCED 12/BX

## (undated) DEVICE — FORCEPS BX L240CM JAW DIA2.8MM L CAP W/ NDL MIC MESH TOOTH

## (undated) DEVICE — 1200 GUARD II KIT W/5MM TUBE W/O VAC TUBE: Brand: GUARDIAN

## (undated) DEVICE — TOWEL SURG W17XL27IN STD BLU COT NONFENESTRATED PREWASHED

## (undated) DEVICE — SUTURE PDS II SZ 1 L27IN ABSRB VLT CT-1 L36MM 1/2 CIR Z341H

## (undated) DEVICE — GAUZE SPONGES,12 PLY: Brand: CURITY

## (undated) DEVICE — SYR 50ML SLIP TIP NSAF LF STRL --

## (undated) DEVICE — TUBING FLTR PLUME AWAY EVAC W/ SUCT DEV DISP PUREVIEW

## (undated) DEVICE — REM POLYHESIVE ADULT PATIENT RETURN ELECTRODE: Brand: VALLEYLAB

## (undated) DEVICE — BNDG ADHESIVE SHEER 3/4X3 -- CONVERT TO ITEM 357948

## (undated) DEVICE — ELECTRO LUBE IS A SINGLE PATIENT USE DEVICE THAT IS INTENDED TO BE USED ON ELECTROSURGICAL ELECTRODES TO REDUCE STICKING.: Brand: KEY SURGICAL ELECTRO LUBE

## (undated) DEVICE — SUTURE ABSRB L6IN L37MM 0 GS-21 GRN 1/2 CIR TAPR PNT NDL VLOCL0306

## (undated) DEVICE — NEEDLE HYPO 22GA L1.5IN BLK S STL HUB POLYPR SHLD REG BVL

## (undated) DEVICE — BAG SPEC BIOHZD LF 2MIL 6X10IN -- CONVERT TO ITEM 357326

## (undated) DEVICE — INFECTION CONTROL KIT SYS

## (undated) DEVICE — COVER LT HNDL BLU PLAS

## (undated) DEVICE — SOLUTION IRRIGATION NACL 0.9% 1000 ML FLX CONTAINER

## (undated) DEVICE — AIRSEAL BIFURCATED SMOKE EVAC FILTERED TUBE SET: Brand: AIRSEAL

## (undated) DEVICE — DEVICE TRNSF SPIK STL 2008S] MICROTEK MEDICAL INC]

## (undated) DEVICE — MULTIPLE BAND LIGATOR: Brand: SPEEDBAND SUPERVIEW SUPER 7

## (undated) DEVICE — 3M™ MEDIPORE™ H SOFT CLOTH TAPE SHORT ROLL TAPE 6INCHES X 2 YARDS 16 ROLLS/CASE 2866S: Brand: 3M™ MEDIPORE™

## (undated) DEVICE — WRISTBAND ID AD W1XL11.5IN RED POLY ALRG PREPRINTED PERM